# Patient Record
Sex: MALE | Race: WHITE | NOT HISPANIC OR LATINO | Employment: OTHER | URBAN - METROPOLITAN AREA
[De-identification: names, ages, dates, MRNs, and addresses within clinical notes are randomized per-mention and may not be internally consistent; named-entity substitution may affect disease eponyms.]

---

## 2017-04-10 ENCOUNTER — OFFICE VISIT (OUTPATIENT)
Dept: DERMATOLOGY | Facility: CLINIC | Age: 67
End: 2017-04-10
Payer: COMMERCIAL

## 2017-04-10 DIAGNOSIS — Z85.828 HISTORY OF NONMELANOMA SKIN CANCER: ICD-10-CM

## 2017-04-10 DIAGNOSIS — L82.1 SK (SEBORRHEIC KERATOSIS): ICD-10-CM

## 2017-04-10 DIAGNOSIS — D22.9 BENIGN NEVUS: ICD-10-CM

## 2017-04-10 DIAGNOSIS — L57.0 ACTINIC KERATOSIS: Primary | ICD-10-CM

## 2017-04-10 PROCEDURE — 17003 DESTRUCT PREMALG LES 2-14: CPT | Mod: S$GLB,,, | Performed by: DERMATOLOGY

## 2017-04-10 PROCEDURE — 99999 PR PBB SHADOW E&M-EST. PATIENT-LVL II: CPT | Mod: PBBFAC,,, | Performed by: DERMATOLOGY

## 2017-04-10 PROCEDURE — 99214 OFFICE O/P EST MOD 30 MIN: CPT | Mod: 25,S$GLB,, | Performed by: DERMATOLOGY

## 2017-04-10 PROCEDURE — 1160F RVW MEDS BY RX/DR IN RCRD: CPT | Mod: S$GLB,,, | Performed by: DERMATOLOGY

## 2017-04-10 PROCEDURE — 17000 DESTRUCT PREMALG LESION: CPT | Mod: S$GLB,,, | Performed by: DERMATOLOGY

## 2017-04-10 RX ORDER — POTASSIUM CHLORIDE 750 MG/1
CAPSULE, EXTENDED RELEASE ORAL
Refills: 1 | COMMUNITY
Start: 2017-03-06 | End: 2017-06-19 | Stop reason: SDUPTHER

## 2017-04-10 RX ORDER — FUROSEMIDE 20 MG/1
20 TABLET ORAL
Refills: 1 | COMMUNITY
Start: 2017-03-06 | End: 2020-02-17

## 2017-04-10 NOTE — PATIENT INSTRUCTIONS
CRYOSURGERY      Your doctor has used a method called cryosurgery to treat your skin condition. Cryosurgery refers to the use of very cold substances to treat a variety of skin conditions such as warts, pre-skin cancers, molluscum contagiosum, sun spots, and several benign growths. The substance we use in cryosurgery is liquid nitrogen and is so cold (-195 degrees Celsius) that is burns when administered.     Following treatment in the office, the skin may immediately burn and become red. You may find the area around the lesion is affected as well. It is sometimes necessary to treat not only the lesion, but a small area of the surrounding normal skin to achieve a good response.     A blister, and even a blood filled blister, may form after treatment.   This is a normal response. If the blister is painful, it is acceptable to sterilize a needle and with rubbing alcohol and gently pop the blister. It is important that you gently wash the area with soap and warm water as the blister fluid may contain wart virus if a wart was treated. Do no remove the roof of the blister.     The area treated can take anywhere from 1-3 weeks to heal. Healing time depends on the kind skin lesion treated, the location, and how aggressively the lesion was treated. It is recommended that the areas treated are covered with Vaseline or bacitracin ointment and a band-aid. If a band-aid is not practical, just ointment applied several times per day will do. Keeping these areas moist will speed the healing time.    Treatment with liquid nitrogen can leave a scar. In dark skin, it may be a light or dark scar, in light skin it may be a white or pink scar. These will generally fade with time, but may never go away completely.     If you have any concerns after your treatment, please feel free to call the office.       [address] [facility] [place] [address]/ [phone] FAX/ [email or web address]    XEROSIS (DRY  SKIN)        1. Definition    Xerosis is the term for dry skin.  We all have a natural oil coating over our skin produced by the skin oil glands.  If this oil is removed, the skin becomes dry which can lead to cracking, which can lead to inflammation.  Xerosis is usually a long-term problem that recurs often, especially in the winter.    2. Cause     Long hot baths or showers can remove our natural oil and lead to xerosis.  One should never take more than one bath or shower a day and for no longer than ten minutes.   Use of harsh soaps such as Zest, Dial, and Ivory can worsen and cause xerosis.   Cold winter weather worsens xerosis because the amount of moisture contained in cold air is much less than the amount of moisture in warm air.    3. Treatment     Treatment is intended to restore the natural oil to your skin.  Keep the skin lubricated.     Do not take more than one bath or shower a day.  Use lukewarm water, not hot.  Hot water dries out the skin.     Use a gentle moisturizing soap such as Cetaphil soap, Oil of Olay, Dove, Basis, Ivory moisture care, Restoraderm cleanser.     When toweling dry, dont rub.  Blot the skin so there is still some water left on the skin.  You should apply a moisturizing cream to all of the skin such as Cerave cream, Cetaphil cream, Restoraderm or Eucerin Original Formula cream.   Alpha hydroxyacid lotions, i.e., AmLactin, also work very well for preventing dry skin, but may burn when used on inflamed or reddened skin.     If you like to swim during the winter months, you should not use soap when getting out of the pool.  When you have finished swimming, rinse off the chlorine with cool to warm water.  If this will be the only shower of the day, then you may use Cetaphil or another mild soap to cleanse your skin.  After the shower, apply a moisturizing cream to all of the skin as above.        [address] [place] [address]/ [phone] FAX/  [email or web address]

## 2017-04-10 NOTE — PROGRESS NOTES
Subjective:       Patient ID:  Matt Rae is a 67 y.o. male who presents for   Chief Complaint   Patient presents with    Spot     right chin x 1 year left ant shoulder x few months, rough no prior tx     Skin Check     TBSE     HPI Comments: History of Present Illness: The patient presents for follow up of skin check.    The patient was last seen on: 12/15/15 by LCB for bx of scc left dorsal hand - excised by PWS  H/o ak's in past  This is a high risk patient here to check for the development of new lesions.    Other skin complaints: spots on leg x 1 year and on left shoulder + rough. No prev treatment      Spot         Review of Systems   Skin: Positive for daily sunscreen use (in moisturizer and wears long sleeve shirts) and activity-related sunscreen use. Negative for recent sunburn.   Hematologic/Lymphatic: Bruises/bleeds easily (on xeralto).        Objective:    Physical Exam   Constitutional: He appears well-developed and well-nourished. He is obese.  No distress.   Neurological: He is alert and oriented to person, place, and time. He is not disoriented.   Psychiatric: He has a normal mood and affect.   Skin:   Areas Examined (abnormalities noted in diagram):   Scalp / Hair Palpated and Inspected  Head / Face Inspection Performed  Neck Inspection Performed  Chest / Axilla Inspection Performed  Abdomen Inspection Performed  Genitals / Buttocks / Groin Inspection Performed  Back Inspection Performed  RUE Inspected  LUE Inspection Performed  RLE Inspected  LLE Inspection Performed  Nails and Digits Inspection Performed                   Diagram Legend     Erythematous scaling macule/papule c/w actinic keratosis       Vascular papule c/w angioma      Pigmented verrucoid papule/plaque c/w seborrheic keratosis      Yellow umbilicated papule c/w sebaceous hyperplasia      Irregularly shaped tan macule c/w lentigo     1-2 mm smooth white papules consistent with Milia      Movable subcutaneous cyst with  punctum c/w epidermal inclusion cyst      Subcutaneous movable cyst c/w pilar cyst      Firm pink to brown papule c/w dermatofibroma      Pedunculated fleshy papule(s) c/w skin tag(s)      Evenly pigmented macule c/w junctional nevus     Mildly variegated pigmented, slightly irregular-bordered macule c/w mildly atypical nevus      Flesh colored to evenly pigmented papule c/w intradermal nevus       Pink pearly papule/plaque c/w basal cell carcinoma      Erythematous hyperkeratotic cursted plaque c/w SCC      Surgical scar with no sign of skin cancer recurrence      Open and closed comedones      Inflammatory papules and pustules      Verrucoid papule consistent consistent with wart     Erythematous eczematous patches and plaques     Dystrophic onycholytic nail with subungual debris c/w onychomycosis     Umbilicated papule    Erythematous-base heme-crusted tan verrucoid plaque consistent with inflamed seborrheic keratosis     Erythematous Silvery Scaling Plaque c/w Psoriasis     See annotation      Assessment / Plan:        Actinic keratosis  Cryosurgery Procedure Note    Verbal consent from the patient is obtained and the patient is aware of the precancerous quality and need for treatment of these lesions. Liquid nitrogen cryosurgery is applied to the 8 actinic keratoses, as detailed in the physical exam, to produce a freeze injury. The patient is aware that blisters may form and is instructed on wound care with gentle cleansing and use of vaseline ointment to keep moist until healed. The patient is supplied a handout on cryosurgery and is instructed to call if lesions do not completely resolve.      SK (seborrheic keratosis)  These are benign inherited growths without a malignant potential. Reassurance given to patient. No treatment is necessary.       Benign nevus  Reassurance.     History of nonmelanoma skin cancer  Area(s) of previous NMSC evaluated with no signs of recurrence.  Total body skin examination  performed today including at least 12 points as noted in physical examination. No lesions suspicious for malignancy noted.    Discussed moisturizing body soaps         Return in about 6 months (around 10/10/2017).

## 2017-04-26 ENCOUNTER — OFFICE VISIT (OUTPATIENT)
Dept: INTERNAL MEDICINE | Facility: CLINIC | Age: 67
End: 2017-04-26
Payer: COMMERCIAL

## 2017-04-26 VITALS
DIASTOLIC BLOOD PRESSURE: 76 MMHG | HEART RATE: 70 BPM | HEIGHT: 73 IN | SYSTOLIC BLOOD PRESSURE: 122 MMHG | BODY MASS INDEX: 38.13 KG/M2 | WEIGHT: 287.69 LBS

## 2017-04-26 DIAGNOSIS — L73.1 INGROWN HAIR: Primary | ICD-10-CM

## 2017-04-26 PROCEDURE — 3074F SYST BP LT 130 MM HG: CPT | Mod: S$GLB,,, | Performed by: INTERNAL MEDICINE

## 2017-04-26 PROCEDURE — 99999 PR PBB SHADOW E&M-EST. PATIENT-LVL III: CPT | Mod: PBBFAC,,, | Performed by: INTERNAL MEDICINE

## 2017-04-26 PROCEDURE — 99213 OFFICE O/P EST LOW 20 MIN: CPT | Mod: S$GLB,,, | Performed by: INTERNAL MEDICINE

## 2017-04-26 PROCEDURE — 3078F DIAST BP <80 MM HG: CPT | Mod: S$GLB,,, | Performed by: INTERNAL MEDICINE

## 2017-04-26 NOTE — PROGRESS NOTES
"Subjective:       Patient ID: Matt Rae is a 67 y.o. male.    Chief Complaint: Oral Swelling (bump on lip) and Sore Throat    HPI     66 yo male w/ hx of long term AC use via xarelto presents to clinic today w/ complaints of x10 day hx of L "lip sore." Pt states that it has drained multiple times, last of which was yesterday. Denies any fevers, swelling, erythema along face or this being a yearly occurrence. Rest of ROS as below.        Review of Systems   Constitutional: Negative for chills, fatigue and fever.   HENT: Negative for congestion, nosebleeds, rhinorrhea, sneezing and sore throat.    Eyes: Negative for photophobia, pain and itching.   Respiratory: Negative for cough, chest tightness, shortness of breath and wheezing.    Cardiovascular: Negative for chest pain and palpitations.   Gastrointestinal: Negative for abdominal distention, anal bleeding, constipation, diarrhea and nausea.   Endocrine: Negative for cold intolerance, heat intolerance, polydipsia and polyuria.   Genitourinary: Negative for dysuria, frequency and urgency.   Musculoskeletal: Negative for arthralgias, joint swelling and myalgias.   Neurological: Negative for dizziness, tremors, seizures, numbness and headaches.   Psychiatric/Behavioral: Negative for agitation.       Objective:      Physical Exam   Constitutional: He is oriented to person, place, and time. He appears well-developed and well-nourished. No distress.   HENT:   Nose: Nose normal.   Mouth/Throat: No oropharyngeal exudate.   Eyes: EOM are normal. Pupils are equal, round, and reactive to light. Right eye exhibits no discharge. Left eye exhibits no discharge. No scleral icterus.   Neck: Normal range of motion. JVD present.   Cardiovascular: Normal rate, regular rhythm, normal heart sounds and intact distal pulses.  Exam reveals no gallop and no friction rub.    No murmur heard.  Pulmonary/Chest: Effort normal. No respiratory distress. He has no rales.   Abdominal: Soft. " Bowel sounds are normal. He exhibits no distension and no mass. There is no tenderness. There is no rebound and no guarding.   Musculoskeletal: Normal range of motion. He exhibits no edema or tenderness.   Lymphadenopathy:     He has no cervical adenopathy.   Neurological: He is alert and oriented to person, place, and time. No cranial nerve deficit.   Skin: Skin is warm. He is not diaphoretic. No erythema.   Psychiatric: He has a normal mood and affect.       Assessment:       1. Ingrown hair        Plan:         --assured pt that ingrown hair/acne is healing, and that it is not anything more serious  --instructed pt to cont warm compresses and apply OTC anti-bacterial cream q daily for the duration of his trip     RTC prfelisha Mallory M.D.  PGY-3, Internal Medicine  [phone]

## 2017-04-26 NOTE — MR AVS SNAPSHOT
[facility]  [address]  [place] [address]  Phone: [phone]  Fax: [phone]                  Matt Rae   2017 1:00 PM   Office Visit    Description:  Male : 1950   Provider:  Alejo Mallory MD   Department:  [facility]           Reason for Visit     Oral Swelling     Sore Throat                To Do List           Goals (5 Years of Data)     None      [facility]     [facility] On Call Nurse Care Line -  Assistance  Unless otherwise directed by your provider, please contact [facility], our nurse care line that is available for  assistance.     Registered nurses in the [facility] Center provide: appointment scheduling, clinical advisement, health education, and other advisory services.  Call: [phone] (toll free)               Medications           Message regarding Medications     Verify the changes and/or additions to your medication regime listed below are the same as discussed with your clinician today.  If any of these changes or additions are incorrect, please notify your healthcare provider.             Verify that the below list of medications is an accurate representation of the medications you are currently taking.  If none reported, the list may be blank. If incorrect, please contact your healthcare provider. Carry this list with you in case of emergency.           Current Medications     aspirin (ECOTRIN) 81 MG EC tablet Take 81 mg by mouth once daily.    b complex vitamins tablet Take 1 tablet by mouth once daily.    captopril (CAPOTEN) 12.5 MG tablet Take 12.5 mg by mouth 2 (two) times daily.     furosemide (LASIX) 20 MG tablet     methocarbamol (ROBAXIN) 750 MG Tab Take 750 mg by mouth as needed.     metoprolol succinate (TOPROL-XL) 25 MG 24 hr tablet Take 25 mg by mouth once daily.     multivitamin capsule Take 1 capsule by mouth once daily.      potassium chloride (MICRO-K) 10 MEQ CpSR      "rivaroxaban (XARELTO) 20 mg Tab Take 20 mg by mouth once daily.    simvastatin (ZOCOR) 10 MG tablet Take 10 mg by mouth every evening.     tramadol (ULTRAM) 50 mg tablet Take 50 mg by mouth once daily.     VIAGRA 100 mg tablet            Clinical Reference Information           Your Vitals Were     BP Pulse Height Weight BMI    122/76 (BP Location: Left arm, Patient Position: Sitting, BP Method: Manual) 70 6' 1" (1.854 m) 130.5 kg (287 lb 11.2 oz) 37.96 kg/m2      Blood Pressure          Most Recent Value    BP  122/76      Allergies as of 4/26/2017     Penicillins      Immunizations Administered on Date of Encounter - 4/26/2017     None      Language Assistance Services     ATTENTION: Language assistance services are available, free of charge. Please call [phone].      ATENCIÓN: Si jamison shipman, tiene a vasquez disposición servicios gratuitos de asistencia lingüística. Llame al [phone].     CHÚ Ý: N?u b?n nói Ti?ng Vi?t, có các d?ch v? h? tr? ngôn ng? mi?n phí dành cho b?n. G?i s? [phone].         [facility] complies with applicable Federal civil rights laws and does not discriminate on the basis of race, color, national origin, age, disability, or sex.        "

## 2017-04-27 NOTE — PROGRESS NOTES
Preceptor note    Patient's history and physical discussed, please refer to resident physician's note for specific details. Pt seen  with resident physician. Medical record reviewed  I agree with resident's assessment and plan.

## 2017-06-19 ENCOUNTER — OFFICE VISIT (OUTPATIENT)
Dept: HEMATOLOGY/ONCOLOGY | Facility: CLINIC | Age: 67
End: 2017-06-19
Payer: COMMERCIAL

## 2017-06-19 VITALS
DIASTOLIC BLOOD PRESSURE: 77 MMHG | WEIGHT: 293.19 LBS | SYSTOLIC BLOOD PRESSURE: 137 MMHG | HEART RATE: 76 BPM | HEIGHT: 73 IN | TEMPERATURE: 98 F | RESPIRATION RATE: 18 BRPM | BODY MASS INDEX: 38.86 KG/M2

## 2017-06-19 DIAGNOSIS — J01.10 ACUTE FRONTAL SINUSITIS, RECURRENCE NOT SPECIFIED: Chronic | ICD-10-CM

## 2017-06-19 DIAGNOSIS — D75.89 MACROCYTOSIS WITHOUT ANEMIA: Chronic | ICD-10-CM

## 2017-06-19 DIAGNOSIS — D68.61 ANTICARDIOLIPIN SYNDROME: Chronic | ICD-10-CM

## 2017-06-19 DIAGNOSIS — Z86.711 HISTORY OF PULMONARY EMBOLUS (PE): Chronic | ICD-10-CM

## 2017-06-19 PROCEDURE — 99214 OFFICE O/P EST MOD 30 MIN: CPT | Mod: ,,, | Performed by: INTERNAL MEDICINE

## 2017-06-19 RX ORDER — AZITHROMYCIN 1 G/1
1 POWDER, FOR SUSPENSION ORAL ONCE
Qty: 1 PACKET | Refills: 0 | Status: SHIPPED | OUTPATIENT
Start: 2017-06-19 | End: 2017-06-19

## 2017-06-19 RX ORDER — POTASSIUM CHLORIDE 750 MG/1
10 TABLET, EXTENDED RELEASE ORAL
COMMUNITY
Start: 2017-05-16 | End: 2020-02-17

## 2017-06-19 NOTE — LETTER
June 19, 2017      Shlomo Zavala MD  [address]  [place] [address]           [facility]  [address]  [address]  [place] [address]  Phone: [phone]  Fax: [phone]          Patient: Matt Rae   MR Number: [id number]   YOB: 1950   Date of Visit: 6/19/2017       Dear Dr. Shlomo Zavala:    Thank you for referring Matt Rae to me for evaluation. Attached you will find relevant portions of my assessment and plan of care.    If you have questions, please do not hesitate to call me. I look forward to following Matt Rae along with you.    Sincerely,    Silvestre James MD    Enclosure  CC:  No Recipients    If you would like to receive this communication electronically, please contact [email or web address] or [phone] to request more information on [facility] access.    For providers and/or their staff who would like to refer a patient to [facility], please contact us through our one-stop-shop provider referral line, [facility] [place], at [phone].    If you feel you have received this communication in error or would no longer like to receive these types of communications, please e-mail [email or web address]

## 2017-06-19 NOTE — ASSESSMENT & PLAN NOTE
Patient seems to be doing well currently.  Continues on Xarleto without significant problems although I feel the bleeding is hemrroidal and worsened by the blood thinner. No clots, DVT/PE.

## 2017-06-19 NOTE — PROGRESS NOTES
PROGRESS NOTE    Subjective:       Patient ID: Matt Rae is a 67 y.o. male.    Chief Complaint:  Follow-up; Results; and Recurrent Sinusitis      History of Present Illness:   Matt Rae is a 67 y.o. male who presents with a history of hypercoag state and macrocytosis.  Patient complaint of sinus headache, nasal drip and congestion. No fever.  Also had intermittent episodes of brbpr associated with hemorrhoids.        Family and Social history reviewed and is unchanged from 12/9/2014.      ROS:  Review of Systems   Constitutional: Negative for fever and unexpected weight change.   HENT: Negative for nosebleeds.    Respiratory: Negative for chest tightness and shortness of breath.    Cardiovascular: Negative for chest pain.   Gastrointestinal: Negative for abdominal pain and blood in stool.   Genitourinary: Negative for hematuria.   Skin: Negative for rash.   Hematological: Does not bruise/bleed easily.          Current Outpatient Prescriptions:     aspirin (ECOTRIN) 81 MG EC tablet, Take 81 mg by mouth once daily., Disp: , Rfl:     b complex vitamins tablet, Take 1 tablet by mouth once daily., Disp: , Rfl:     captopril (CAPOTEN) 12.5 MG tablet, Take 12.5 mg by mouth 2 (two) times daily. , Disp: , Rfl:     furosemide (LASIX) 20 MG tablet, , Disp: , Rfl: 1    methocarbamol (ROBAXIN) 750 MG Tab, Take 750 mg by mouth as needed. , Disp: , Rfl:     metoprolol succinate (TOPROL-XL) 25 MG 24 hr tablet, Take 25 mg by mouth once daily. , Disp: , Rfl:     multivitamin capsule, Take 1 capsule by mouth once daily.  , Disp: , Rfl:     potassium chloride (KLOR-CON) 10 MEQ TbSR, , Disp: , Rfl:     rivaroxaban (XARELTO) 20 mg Tab, Take 20 mg by mouth once daily., Disp: , Rfl:     simvastatin (ZOCOR) 10 MG tablet, Take 10 mg by mouth every evening. , Disp: , Rfl:     tramadol (ULTRAM) 50 mg tablet, Take 50 mg by mouth once daily. , Disp: , Rfl:     VIAGRA  "100 mg tablet, , Disp: , Rfl:     azithromycin (ZITHROMAX) 1 gram Pack, Take 1 g by mouth once., Disp: 1 packet, Rfl: 0        Objective:       Physical Examination:     /77   Pulse 76   Temp 98.1 °F (36.7 °C)   Resp 18   Ht 6' 1" (1.854 m)   Wt 133 kg (293 lb 3.2 oz)   BMI 38.68 kg/m²     Physical Exam   Constitutional: He is oriented to person, place, and time. He appears well-developed and well-nourished.   HENT:   Head: Normocephalic and atraumatic.   Right Ear: External ear normal.   Left Ear: External ear normal.   Mouth/Throat: Oropharynx is clear and moist.   Eyes: Conjunctivae are normal. Pupils are equal, round, and reactive to light. No scleral icterus.   Neck: Normal range of motion. Neck supple.   Cardiovascular: Normal rate, regular rhythm and normal heart sounds.  Exam reveals no gallop and no friction rub.    No murmur heard.  Pulmonary/Chest: Effort normal and breath sounds normal. No respiratory distress. He has no rales. He exhibits no tenderness.   Abdominal: Soft. Bowel sounds are normal. He exhibits no distension and no mass. There is no hepatosplenomegaly. There is no tenderness. There is no rebound and no guarding.   Musculoskeletal: He exhibits no edema.   Lymphadenopathy:        Head (right side): No tonsillar adenopathy present.        Head (left side): No tonsillar adenopathy present.     He has no cervical adenopathy.     He has no axillary adenopathy.        Right: No supraclavicular adenopathy present.        Left: No supraclavicular adenopathy present.   Neurological: He is alert and oriented to person, place, and time.   Psychiatric: He has a normal mood and affect. His behavior is normal. Judgment and thought content normal.   Vitals reviewed.      Labs:   No results found for this or any previous visit (from the past 336 hour(s)).  CMP  Sodium   Date Value Ref Range Status   03/09/2016 140 136 - 145 mmol/L Final     Potassium   Date Value Ref Range Status   03/09/2016 " 4.0 3.5 - 5.1 mmol/L Final     Chloride   Date Value Ref Range Status   03/09/2016 103 95 - 110 mmol/L Final     CO2   Date Value Ref Range Status   03/09/2016 24 23 - 29 mmol/L Final     Glucose   Date Value Ref Range Status   03/09/2016 91 70 - 110 mg/dL Final     BUN, Bld   Date Value Ref Range Status   03/09/2016 16 9 - 21 mg/dL Final     Creatinine   Date Value Ref Range Status   03/09/2016 0.85 0.50 - 1.40 mg/dL Final   01/27/2013 1.0 0.5 - 1.4 mg/dL Final     Calcium   Date Value Ref Range Status   03/09/2016 9.1 8.7 - 10.5 mg/dL Final   01/27/2013 9.5 8.7 - 10.5 mg/dL Final     Total Protein   Date Value Ref Range Status   03/09/2016 6.9 6.0 - 8.4 g/dL Final     Albumin   Date Value Ref Range Status   03/09/2016 3.9 3.5 - 5.2 g/dL Final     Total Bilirubin   Date Value Ref Range Status   03/09/2016 0.4 0.1 - 1.0 mg/dL Final     Comment:     For infants and newborns, interpretation of results should be based  on gestational age, weight and in agreement with clinical  observations.  Premature Infant recommended reference ranges:  Up to 24 hours.............<8.0 mg/dL  Up to 48 hours............<12.0 mg/dL  3-5 days..................<15.0 mg/dL  6-29 days.................<15.0 mg/dL       Alkaline Phosphatase   Date Value Ref Range Status   03/09/2016 56 38 - 145 U/L Final   01/27/2013 63 55 - 135 U/L Final     AST ([place])   Date Value Ref Range Status   03/09/2016 29 17 - 59 U/L Final     ALT   Date Value Ref Range Status   03/09/2016 32 10 - 44 U/L Final     Anion Gap   Date Value Ref Range Status   03/09/2016 13 8 - 16 mmol/L Final   01/27/2013 13 5 - 15 meq/L Final     eGFR if    Date Value Ref Range Status   03/09/2016 >60 >60 mL/min/1.73 m^2 Final     eGFR if non    Date Value Ref Range Status   03/09/2016 >60 >60 mL/min/1.73 m^2 Final     Comment:     Calculation used to obtain the estimated glomerular filtration  rate (eGFR) is the CKD-EPI equation. Since race is  unknown   in our information system, the eGFR values for   -American and Non--American patients are given   for each creatinine result.       No results found for: CEA  Lab Results   Component Value Date    PSA 1.0 01/26/2016    PSA 0.57 05/14/2010    PSA 0.8 06/20/2008           Assessment/Plan:   Anticardiolipin syndrome  Patient seems to be doing well currently.  Continues on Xarleto without significant problems although I feel the bleeding is hemrroidal and worsened by the blood thinner. No clots, DVT/PE.      Acute frontal sinusitis  Will prescibe azithromycin.  Patient continues to have symptoms.      Discussion:   I have spent greater than 25 minutes in the care of this patient discussing the issues reflected in the assessment and plan.  Greater than 50% face to face.  All questions answered to the patients satisfaction.    Return in about 6 months (around 12/19/2017).      Electronically signed by Silvestre Albert

## 2017-09-18 PROBLEM — J01.10 ACUTE FRONTAL SINUSITIS: Chronic | Status: RESOLVED | Noted: 2017-06-19 | Resolved: 2017-09-18

## 2017-09-25 ENCOUNTER — OFFICE VISIT (OUTPATIENT)
Dept: HEMATOLOGY/ONCOLOGY | Facility: CLINIC | Age: 67
End: 2017-09-25
Payer: COMMERCIAL

## 2017-09-25 VITALS
WEIGHT: 292.13 LBS | HEIGHT: 73 IN | HEART RATE: 76 BPM | SYSTOLIC BLOOD PRESSURE: 120 MMHG | TEMPERATURE: 98 F | BODY MASS INDEX: 38.72 KG/M2 | DIASTOLIC BLOOD PRESSURE: 87 MMHG | RESPIRATION RATE: 18 BRPM

## 2017-09-25 DIAGNOSIS — R58 BLEEDING: Chronic | ICD-10-CM

## 2017-09-25 DIAGNOSIS — D68.61 ANTICARDIOLIPIN SYNDROME: Chronic | ICD-10-CM

## 2017-09-25 PROCEDURE — 3008F BODY MASS INDEX DOCD: CPT | Mod: ,,, | Performed by: INTERNAL MEDICINE

## 2017-09-25 PROCEDURE — 3074F SYST BP LT 130 MM HG: CPT | Mod: ,,, | Performed by: INTERNAL MEDICINE

## 2017-09-25 PROCEDURE — 3079F DIAST BP 80-89 MM HG: CPT | Mod: ,,, | Performed by: INTERNAL MEDICINE

## 2017-09-25 PROCEDURE — 99214 OFFICE O/P EST MOD 30 MIN: CPT | Mod: ,,, | Performed by: INTERNAL MEDICINE

## 2017-09-25 RX ORDER — AZITHROMYCIN 500 MG/1
TABLET, FILM COATED ORAL
COMMUNITY
Start: 2017-06-19 | End: 2018-03-07

## 2017-09-25 NOTE — PROGRESS NOTES
PROGRESS NOTE    Subjective:       Patient ID: Matt Rae is a 67 y.o. male.    Chief Complaint:  Follow-up (hospital stay ) and Results (labs on chart)      History of Present Illness:   Matt Rae is a 67 y.o. male who presents with a history of hypercoag state and macrocytosis. Patient went to the ED 3 days ago with an episode of epistaxis which started early in the morning.  This began spontaneously and he was unable to control it so he went to the hospital.  He was given afrin in ER and it ceased after approximately 30 minutes.  He was on his xarelto and had not missed any doses.      Family and Social history reviewed and is unchanged from 12/9/2014.      ROS:  Review of Systems   Constitutional: Negative for fever and unexpected weight change.   HENT: Negative for nosebleeds.    Respiratory: Negative for chest tightness and shortness of breath.    Cardiovascular: Negative for chest pain.   Gastrointestinal: Negative for abdominal pain and blood in stool.   Genitourinary: Negative for hematuria.   Skin: Negative for rash.   Hematological: Does not bruise/bleed easily.          Current Outpatient Prescriptions:     aspirin (ECOTRIN) 81 MG EC tablet, Take 81 mg by mouth once daily., Disp: , Rfl:     azithromycin (ZITHROMAX) 500 MG tablet, , Disp: , Rfl:     b complex vitamins tablet, Take 1 tablet by mouth once daily., Disp: , Rfl:     captopril (CAPOTEN) 12.5 MG tablet, Take 12.5 mg by mouth 2 (two) times daily. , Disp: , Rfl:     furosemide (LASIX) 20 MG tablet, , Disp: , Rfl: 1    methocarbamol (ROBAXIN) 750 MG Tab, Take 750 mg by mouth as needed. , Disp: , Rfl:     metoprolol succinate (TOPROL-XL) 25 MG 24 hr tablet, Take 25 mg by mouth once daily. , Disp: , Rfl:     multivitamin capsule, Take 1 capsule by mouth once daily.  , Disp: , Rfl:     potassium chloride (KLOR-CON) 10 MEQ TbSR, , Disp: , Rfl:     rivaroxaban (XARELTO) 20 mg  "Tab, Take 20 mg by mouth once daily., Disp: , Rfl:     simvastatin (ZOCOR) 10 MG tablet, Take 10 mg by mouth every evening. , Disp: , Rfl:     tramadol (ULTRAM) 50 mg tablet, Take 50 mg by mouth once daily. , Disp: , Rfl:     VIAGRA 100 mg tablet, , Disp: , Rfl:     rivaroxaban (XARELTO) 10 mg Tab, Take 1 tablet (10 mg total) by mouth daily with dinner or evening meal., Disp: 30 tablet, Rfl: 11        Objective:       Physical Examination:     /87   Pulse 76   Temp 98.1 °F (36.7 °C)   Resp 18   Ht 6' 1" (1.854 m)   Wt 132.5 kg (292 lb 1.6 oz)   BMI 38.54 kg/m²     Physical Exam   Constitutional: He is oriented to person, place, and time. He appears well-developed and well-nourished.   HENT:   Head: Normocephalic and atraumatic.   Right Ear: External ear normal.   Left Ear: External ear normal.   Mouth/Throat: Oropharynx is clear and moist.   Eyes: Conjunctivae are normal. Pupils are equal, round, and reactive to light. No scleral icterus.   Neck: Normal range of motion. Neck supple.   Cardiovascular: Normal rate, regular rhythm and normal heart sounds.  Exam reveals no gallop and no friction rub.    No murmur heard.  Pulmonary/Chest: Effort normal and breath sounds normal. No respiratory distress. He has no rales. He exhibits no tenderness.   Abdominal: Soft. Bowel sounds are normal. He exhibits no distension and no mass. There is no hepatosplenomegaly. There is no tenderness. There is no rebound and no guarding.   Musculoskeletal: He exhibits no edema.   Lymphadenopathy:        Head (right side): No tonsillar adenopathy present.        Head (left side): No tonsillar adenopathy present.     He has no cervical adenopathy.     He has no axillary adenopathy.        Right: No supraclavicular adenopathy present.        Left: No supraclavicular adenopathy present.   Neurological: He is alert and oriented to person, place, and time.   Psychiatric: He has a normal mood and affect. His behavior is normal. " Judgment and thought content normal.   Vitals reviewed.      Labs:   No results found for this or any previous visit (from the past 336 hour(s)).  CMP  Sodium   Date Value Ref Range Status   03/09/2016 140 136 - 145 mmol/L Final     Potassium   Date Value Ref Range Status   03/09/2016 4.0 3.5 - 5.1 mmol/L Final     Chloride   Date Value Ref Range Status   03/09/2016 103 95 - 110 mmol/L Final     CO2   Date Value Ref Range Status   03/09/2016 24 23 - 29 mmol/L Final     Glucose   Date Value Ref Range Status   03/09/2016 91 70 - 110 mg/dL Final     BUN, Bld   Date Value Ref Range Status   03/09/2016 16 9 - 21 mg/dL Final     Creatinine   Date Value Ref Range Status   03/09/2016 0.85 0.50 - 1.40 mg/dL Final   01/27/2013 1.0 0.5 - 1.4 mg/dL Final     Calcium   Date Value Ref Range Status   03/09/2016 9.1 8.7 - 10.5 mg/dL Final   01/27/2013 9.5 8.7 - 10.5 mg/dL Final     Total Protein   Date Value Ref Range Status   03/09/2016 6.9 6.0 - 8.4 g/dL Final     Albumin   Date Value Ref Range Status   03/09/2016 3.9 3.5 - 5.2 g/dL Final     Total Bilirubin   Date Value Ref Range Status   03/09/2016 0.4 0.1 - 1.0 mg/dL Final     Comment:     For infants and newborns, interpretation of results should be based  on gestational age, weight and in agreement with clinical  observations.  Premature Infant recommended reference ranges:  Up to 24 hours.............<8.0 mg/dL  Up to 48 hours............<12.0 mg/dL  3-5 days..................<15.0 mg/dL  6-29 days.................<15.0 mg/dL       Alkaline Phosphatase   Date Value Ref Range Status   03/09/2016 56 38 - 145 U/L Final   01/27/2013 63 55 - 135 U/L Final     AST ([place])   Date Value Ref Range Status   03/09/2016 29 17 - 59 U/L Final     ALT   Date Value Ref Range Status   03/09/2016 32 10 - 44 U/L Final     Anion Gap   Date Value Ref Range Status   03/09/2016 13 8 - 16 mmol/L Final   01/27/2013 13 5 - 15 meq/L Final     eGFR if    Date Value Ref Range  Status   03/09/2016 >60 >60 mL/min/1.73 m^2 Final     eGFR if non    Date Value Ref Range Status   03/09/2016 >60 >60 mL/min/1.73 m^2 Final     Comment:     Calculation used to obtain the estimated glomerular filtration  rate (eGFR) is the CKD-EPI equation. Since race is unknown   in our information system, the eGFR values for   -American and Non--American patients are given   for each creatinine result.       No results found for: CEA  Lab Results   Component Value Date    PSA 1.0 01/26/2016    PSA 0.57 05/14/2010    PSA 0.8 06/20/2008           Assessment/Plan:   Anticardiolipin syndrome  Patient has developed a bleeding issues while on Xarelto 20mg daily.  Will give another 3 days off then begin on prophylactic dosing given this risk of bleeding.  Will begin 10mg daily.  Patient will call with any further problems or issues.  Will have him back in one month to see how things are going.     Bleeding/Epistaxis  See above.     Discussion:   I have spent greater than 25 minutes in the care of this patient discussing the issues reflected in the assessment and plan.  Greater than 50% face to face.  All questions answered to the patients satisfaction.    Return in about 4 weeks (around 10/23/2017).      Electronically signed by Silvestre Albert

## 2017-09-25 NOTE — ASSESSMENT & PLAN NOTE
Patient has developed a bleeding issues while on Xarelto 20mg daily.  Will give another 3 days off then begin on prophylactic dosing given this risk of bleeding.  Will begin 10mg daily.  Patient will call with any further problems or issues.  Will have him back in one month to see how things are going.

## 2017-09-25 NOTE — LETTER
September 25, 2017      Shlomo Zavala MD  [address]  [place] [address]           [facility]  [address]  [address]  [place] [address]  Phone: [phone]  Fax: [phone]          Patient: Matt Rae   MR Number: [id number]   YOB: 1950   Date of Visit: 9/25/2017       Dear Dr. Shlomo Zavala:    Thank you for referring Matt Rae to me for evaluation. Attached you will find relevant portions of my assessment and plan of care.    If you have questions, please do not hesitate to call me. I look forward to following Matt Rae along with you.    Sincerely,    Silvestre James MD    Enclosure  CC:  No Recipients    If you would like to receive this communication electronically, please contact [email or web address] or [phone] to request more information on [facility] Link access.    For providers and/or their staff who would like to refer a patient to [facility], please contact us through our one-stop-shop provider referral line, [facility], at [phone].    If you feel you have received this communication in error or would no longer like to receive these types of communications, please e-mail [email or web address]

## 2017-09-27 ENCOUNTER — TELEPHONE (OUTPATIENT)
Dept: HEMATOLOGY/ONCOLOGY | Facility: CLINIC | Age: 67
End: 2017-09-27

## 2017-09-27 NOTE — TELEPHONE ENCOUNTER
----- Message from Verónica Samaniego sent at 9/27/2017  2:43 PM CDT -----  Contact: ELLA  PATIENT WAS TOLD TO LET DR MARTINEZ KNOW IF HE HAD ANOTHER NOSE BLEED AND HE STATED THAT HE DID HAVE ANOTHER NOSE BLEED TODAY. PLEASE ADVISE

## 2017-09-27 NOTE — TELEPHONE ENCOUNTER
Called pt to let him know to hold his xarelto for one week and resume the lower doseage after the week has ended.

## 2017-10-24 ENCOUNTER — OFFICE VISIT (OUTPATIENT)
Dept: HEMATOLOGY/ONCOLOGY | Facility: CLINIC | Age: 67
End: 2017-10-24
Payer: COMMERCIAL

## 2017-10-24 VITALS
TEMPERATURE: 98 F | HEIGHT: 73 IN | DIASTOLIC BLOOD PRESSURE: 75 MMHG | BODY MASS INDEX: 38.94 KG/M2 | SYSTOLIC BLOOD PRESSURE: 141 MMHG | RESPIRATION RATE: 18 BRPM | HEART RATE: 58 BPM | WEIGHT: 293.81 LBS

## 2017-10-24 DIAGNOSIS — D68.61 ANTICARDIOLIPIN SYNDROME: Chronic | ICD-10-CM

## 2017-10-24 PROCEDURE — 99213 OFFICE O/P EST LOW 20 MIN: CPT | Mod: ,,, | Performed by: INTERNAL MEDICINE

## 2017-10-24 NOTE — LETTER
October 24, 2017      Shlomo Zavala MD  [address]  [place] [address]           [facility]  [address]  [address]  [place] [address]  Phone: [phone]  Fax: [phone]          Patient: Matt Rae   MR Number: [id number]   YOB: 1950   Date of Visit: 10/24/2017       Dear Dr. Shlomo Zavala:    Thank you for referring Matt Rae to me for evaluation. Attached you will find relevant portions of my assessment and plan of care.    If you have questions, please do not hesitate to call me. I look forward to following Matt Rae along with you.    Sincerely,    Silvestre James MD    Enclosure  CC:  No Recipients    If you would like to receive this communication electronically, please contact [email or web address] or [phone] to request more information on [employer] Link access.    For providers and/or their staff who would like to refer a patient to [facility], please contact us through our one-stop-shop provider referral line, [facility], at [phone].    If you feel you have received this communication in error or would no longer like to receive these types of communications, please e-mail [email or web address]

## 2017-10-24 NOTE — ASSESSMENT & PLAN NOTE
Patient has no further bleeding on the prophylactic dose of Xarelto.  I instructed him to continue on the 10mg qHS and watch for any new symptoms such as sob, cp or lower ext swelling or pain.  Will see him again in 4 months with labs.

## 2017-10-24 NOTE — PROGRESS NOTES
PROGRESS NOTE    Subjective:       Patient ID: Matt Rae is a 67 y.o. male.    Chief Complaint:  Follow-up      History of Present Illness:   Matt Rae is a 67 y.o. male who presents with a history of hypercoag state and macrocytosis. Patient is now on 10mg of Xarelto and tolerating this well.  He has no bleeding at this time and overall feels well.  No sob, cp.       Family and Social history reviewed and is unchanged from 12/9/2014.      ROS:  Review of Systems   Constitutional: Negative for fever and unexpected weight change.   HENT: Negative for nosebleeds.    Respiratory: Negative for chest tightness and shortness of breath.    Cardiovascular: Negative for chest pain.   Gastrointestinal: Negative for abdominal pain and blood in stool.   Genitourinary: Negative for hematuria.   Skin: Negative for rash.   Hematological: Does not bruise/bleed easily.          Current Outpatient Prescriptions:     aspirin (ECOTRIN) 81 MG EC tablet, Take 81 mg by mouth once daily., Disp: , Rfl:     azithromycin (ZITHROMAX) 500 MG tablet, , Disp: , Rfl:     b complex vitamins tablet, Take 1 tablet by mouth once daily., Disp: , Rfl:     captopril (CAPOTEN) 12.5 MG tablet, Take 12.5 mg by mouth 2 (two) times daily. , Disp: , Rfl:     furosemide (LASIX) 20 MG tablet, , Disp: , Rfl: 1    methocarbamol (ROBAXIN) 750 MG Tab, Take 750 mg by mouth as needed. , Disp: , Rfl:     metoprolol succinate (TOPROL-XL) 25 MG 24 hr tablet, Take 25 mg by mouth once daily. , Disp: , Rfl:     multivitamin capsule, Take 1 capsule by mouth once daily.  , Disp: , Rfl:     potassium chloride (KLOR-CON) 10 MEQ TbSR, , Disp: , Rfl:     simvastatin (ZOCOR) 10 MG tablet, Take 10 mg by mouth every evening. , Disp: , Rfl:     tramadol (ULTRAM) 50 mg tablet, Take 50 mg by mouth once daily. , Disp: , Rfl:     VIAGRA 100 mg tablet, , Disp: , Rfl:         Objective:       Physical  "Examination:     BP (!) 141/75   Pulse (!) 58   Temp 97.6 °F (36.4 °C)   Resp 18   Ht 6' 1" (1.854 m)   Wt 133.3 kg (293 lb 12.8 oz)   BMI 38.76 kg/m²     Physical Exam   Constitutional: He is oriented to person, place, and time. He appears well-developed and well-nourished.   HENT:   Head: Normocephalic and atraumatic.   Right Ear: External ear normal.   Left Ear: External ear normal.   Mouth/Throat: Oropharynx is clear and moist.   Eyes: Conjunctivae are normal. Pupils are equal, round, and reactive to light. No scleral icterus.   Neck: Normal range of motion. Neck supple.   Cardiovascular: Normal rate, regular rhythm and normal heart sounds.  Exam reveals no gallop and no friction rub.    No murmur heard.  Pulmonary/Chest: Effort normal and breath sounds normal. No respiratory distress. He has no rales. He exhibits no tenderness.   Abdominal: Soft. Bowel sounds are normal. He exhibits no distension and no mass. There is no hepatosplenomegaly. There is no tenderness. There is no rebound and no guarding.   Musculoskeletal: He exhibits no edema.   Lymphadenopathy:        Head (right side): No tonsillar adenopathy present.        Head (left side): No tonsillar adenopathy present.     He has no cervical adenopathy.     He has no axillary adenopathy.        Right: No supraclavicular adenopathy present.        Left: No supraclavicular adenopathy present.   Neurological: He is alert and oriented to person, place, and time.   Psychiatric: He has a normal mood and affect. His behavior is normal. Judgment and thought content normal.   Vitals reviewed.      Labs:   No results found for this or any previous visit (from the past 336 hour(s)).  CMP  Sodium   Date Value Ref Range Status   03/09/2016 140 136 - 145 mmol/L Final     Potassium   Date Value Ref Range Status   03/09/2016 4.0 3.5 - 5.1 mmol/L Final     Chloride   Date Value Ref Range Status   03/09/2016 103 95 - 110 mmol/L Final     CO2   Date Value Ref Range " Status   03/09/2016 24 23 - 29 mmol/L Final     Glucose   Date Value Ref Range Status   03/09/2016 91 70 - 110 mg/dL Final     BUN, Bld   Date Value Ref Range Status   03/09/2016 16 9 - 21 mg/dL Final     Creatinine   Date Value Ref Range Status   03/09/2016 0.85 0.50 - 1.40 mg/dL Final   01/27/2013 1.0 0.5 - 1.4 mg/dL Final     Calcium   Date Value Ref Range Status   03/09/2016 9.1 8.7 - 10.5 mg/dL Final   01/27/2013 9.5 8.7 - 10.5 mg/dL Final     Total Protein   Date Value Ref Range Status   03/09/2016 6.9 6.0 - 8.4 g/dL Final     Albumin   Date Value Ref Range Status   03/09/2016 3.9 3.5 - 5.2 g/dL Final     Total Bilirubin   Date Value Ref Range Status   03/09/2016 0.4 0.1 - 1.0 mg/dL Final     Comment:     For infants and newborns, interpretation of results should be based  on gestational age, weight and in agreement with clinical  observations.  Premature Infant recommended reference ranges:  Up to 24 hours.............<8.0 mg/dL  Up to 48 hours............<12.0 mg/dL  3-5 days..................<15.0 mg/dL  6-29 days.................<15.0 mg/dL       Alkaline Phosphatase   Date Value Ref Range Status   03/09/2016 56 38 - 145 U/L Final   01/27/2013 63 55 - 135 U/L Final     AST ([place])   Date Value Ref Range Status   03/09/2016 29 17 - 59 U/L Final     ALT   Date Value Ref Range Status   03/09/2016 32 10 - 44 U/L Final     Anion Gap   Date Value Ref Range Status   03/09/2016 13 8 - 16 mmol/L Final   01/27/2013 13 5 - 15 meq/L Final     eGFR if    Date Value Ref Range Status   03/09/2016 >60 >60 mL/min/1.73 m^2 Final     eGFR if non    Date Value Ref Range Status   03/09/2016 >60 >60 mL/min/1.73 m^2 Final     Comment:     Calculation used to obtain the estimated glomerular filtration  rate (eGFR) is the CKD-EPI equation. Since race is unknown   in our information system, the eGFR values for   -American and Non--American patients are given   for each  creatinine result.       No results found for: CEA  Lab Results   Component Value Date    PSA 1.0 01/26/2016    PSA 0.57 05/14/2010    PSA 0.8 06/20/2008           Assessment/Plan:   Anticardiolipin syndrome  Patient has no further bleeding on the prophylactic dose of Xarelto.  I instructed him to continue on the 10mg qHS and watch for any new symptoms such as sob, cp or lower ext swelling or pain.  Will see him again in 4 months with labs.      Discussion:     Return in about 4 months (around 2/24/2018).      Electronically signed by Silvestre Albetr

## 2018-02-02 ENCOUNTER — PATIENT MESSAGE (OUTPATIENT)
Dept: UROLOGY | Facility: CLINIC | Age: 68
End: 2018-02-02

## 2018-02-21 LAB
ALBUMIN SERPL-MCNC: 3.3 G/DL (ref 3.1–4.7)
ALP SERPL-CCNC: 41 IU/L (ref 40–104)
ALT (SGPT): 22 IU/L (ref 3–33)
AST SERPL-CCNC: 25 IU/L (ref 10–40)
BASOPHILS NFR BLD: 0 K/UL (ref 0–0.2)
BASOPHILS NFR BLD: 0.4 %
BILIRUB SERPL-MCNC: 0.8 MG/DL (ref 0.3–1)
BUN SERPL-MCNC: 17 MG/DL (ref 8–20)
CALCIUM SERPL-MCNC: 8.9 MG/DL (ref 7.7–10.4)
CHLORIDE: 105 MMOL/L (ref 98–110)
CO2 SERPL-SCNC: 26.1 MMOL/L (ref 22.8–31.6)
CREATININE: 0.9 MG/DL (ref 0.6–1.4)
EOSINOPHIL NFR BLD: 0.2 K/UL (ref 0–0.7)
EOSINOPHIL NFR BLD: 3.1 %
ERYTHROCYTE [DISTWIDTH] IN BLOOD BY AUTOMATED COUNT: 13.2 % (ref 11.7–14.9)
GLUCOSE: 109 MG/DL (ref 70–99)
GRAN #: 2.6 K/UL (ref 1.4–6.5)
GRAN%: 52.6 %
HCT VFR BLD AUTO: 45 % (ref 39–55)
HGB BLD-MCNC: 15.3 G/DL (ref 14–16)
IMMATURE GRANS (ABS): 0 K/UL (ref 0–1)
IMMATURE GRANULOCYTES: 0.4 %
LYMPH #: 1.6 K/UL (ref 1.2–3.4)
LYMPH%: 33.6 %
MCH RBC QN AUTO: 34.9 PG (ref 25–35)
MCHC RBC AUTO-ENTMCNC: 34 G/DL (ref 31–36)
MCV RBC AUTO: 102.5 FL (ref 80–100)
MONO #: 0.5 K/UL (ref 0.1–0.6)
MONO%: 9.9 %
NUCLEATED RBCS: 0 %
PLATELET # BLD AUTO: 164 K/UL (ref 140–440)
PMV BLD AUTO: 11.3 FL (ref 8.8–12.7)
POTASSIUM SERPL-SCNC: 4.5 MMOL/L (ref 3.5–5)
PROT SERPL-MCNC: 6.2 G/DL (ref 6–8.2)
RBC # BLD AUTO: 4.39 M/UL (ref 4.3–5.9)
SODIUM: 138 MMOL/L (ref 134–144)
WBC # BLD AUTO: 5.1 K/UL (ref 5–10)

## 2018-02-27 ENCOUNTER — OFFICE VISIT (OUTPATIENT)
Dept: HEMATOLOGY/ONCOLOGY | Facility: CLINIC | Age: 68
End: 2018-02-27
Payer: COMMERCIAL

## 2018-02-27 VITALS
HEART RATE: 60 BPM | HEIGHT: 74 IN | BODY MASS INDEX: 37.6 KG/M2 | TEMPERATURE: 98 F | SYSTOLIC BLOOD PRESSURE: 139 MMHG | RESPIRATION RATE: 18 BRPM | DIASTOLIC BLOOD PRESSURE: 90 MMHG | WEIGHT: 293 LBS

## 2018-02-27 DIAGNOSIS — Z86.711 HISTORY OF PULMONARY EMBOLUS (PE): Chronic | ICD-10-CM

## 2018-02-27 DIAGNOSIS — D68.61 ANTICARDIOLIPIN SYNDROME: Chronic | ICD-10-CM

## 2018-02-27 DIAGNOSIS — D75.89 MACROCYTOSIS WITHOUT ANEMIA: Chronic | ICD-10-CM

## 2018-02-27 PROCEDURE — 3008F BODY MASS INDEX DOCD: CPT | Mod: ,,, | Performed by: INTERNAL MEDICINE

## 2018-02-27 PROCEDURE — 99213 OFFICE O/P EST LOW 20 MIN: CPT | Mod: ,,, | Performed by: INTERNAL MEDICINE

## 2018-02-27 RX ORDER — DOXYCYCLINE HYCLATE 100 MG
TABLET ORAL
COMMUNITY
Start: 2018-01-05 | End: 2018-03-07

## 2018-02-27 RX ORDER — METHYLPREDNISOLONE 4 MG/1
TABLET ORAL
COMMUNITY
Start: 2018-01-05 | End: 2018-03-07

## 2018-02-27 RX ORDER — PROMETHAZINE HYDROCHLORIDE AND CODEINE PHOSPHATE 6.25; 1 MG/5ML; MG/5ML
SOLUTION ORAL
COMMUNITY
Start: 2017-12-31 | End: 2018-03-07

## 2018-02-27 RX ORDER — HYDROCODONE POLISTIREX AND CHLORPHENIRAMINE POLISTIREX 10; 8 MG/5ML; MG/5ML
SUSPENSION, EXTENDED RELEASE ORAL
COMMUNITY
Start: 2018-01-05 | End: 2018-03-07

## 2018-02-27 RX ORDER — GABAPENTIN 300 MG/1
CAPSULE ORAL
COMMUNITY
Start: 2017-11-21 | End: 2018-03-07

## 2018-02-27 RX ORDER — ALBUTEROL SULFATE 90 UG/1
AEROSOL, METERED RESPIRATORY (INHALATION)
COMMUNITY
Start: 2018-01-05 | End: 2018-03-07

## 2018-02-27 RX ORDER — CIPROFLOXACIN 500 MG/1
TABLET ORAL
COMMUNITY
Start: 2017-12-14 | End: 2018-03-07

## 2018-02-27 RX ORDER — RIVAROXABAN 15 MG/1
15 TABLET, FILM COATED ORAL DAILY
COMMUNITY
Start: 2018-01-28 | End: 2021-03-01 | Stop reason: SDUPTHER

## 2018-02-27 RX ORDER — CLINDAMYCIN HYDROCHLORIDE 300 MG/1
CAPSULE ORAL
COMMUNITY
Start: 2017-12-14 | End: 2018-03-07

## 2018-02-27 RX ORDER — OSELTAMIVIR PHOSPHATE 75 MG/1
CAPSULE ORAL
COMMUNITY
Start: 2017-12-26 | End: 2018-03-07

## 2018-02-27 RX ORDER — METRONIDAZOLE 500 MG/1
TABLET ORAL
COMMUNITY
Start: 2017-12-14 | End: 2018-03-07

## 2018-02-27 NOTE — LETTER
February 27, 2018      Shlomo Zavala MD  [address]  [place] [address]           [facility]  [address]  [address]  [place] [address]  Phone: [phone]  Fax: [phone]          Patient: Matt Rae   MR Number: [id number]   YOB: 1950   Date of Visit: 2/27/2018       Dear Dr. Shlomo Zavala:    Thank you for referring Matt Rae to me for evaluation. Attached you will find relevant portions of my assessment and plan of care.    If you have questions, please do not hesitate to call me. I look forward to following Matt Rae along with you.    Sincerely,    Silvestre James MD    Enclosure  CC:  No Recipients    If you would like to receive this communication electronically, please contact [email or web address] or [phone] to request more information on [employer] Link access.    For providers and/or their staff who would like to refer a patient to [facility], please contact us through our one-stop-shop provider referral line, [facility], at [phone].    If you feel you have received this communication in error or would no longer like to receive these types of communications, please e-mail [email or web address]

## 2018-02-27 NOTE — PROGRESS NOTES
PROGRESS NOTE    Subjective:       Patient ID: Matt Rae is a 67 y.o. male.    Chief Complaint:  Follow-up and Results (labs in epic)      History of Present Illness:   Matt Rae is a 67 y.o. male who presents with a history of hypercoag state and macrocytosis. Patient is now passing protein in his urine and may need a renal biopsy.  To see him again in one month.       Family and Social history reviewed and is unchanged from 12/9/2014.      ROS:  Review of Systems   Constitutional: Negative for fever and unexpected weight change.   HENT: Negative for nosebleeds.    Respiratory: Negative for chest tightness and shortness of breath.    Cardiovascular: Negative for chest pain.   Gastrointestinal: Negative for abdominal pain and blood in stool.   Genitourinary: Negative for hematuria.   Skin: Negative for rash.   Hematological: Does not bruise/bleed easily.          Current Outpatient Prescriptions:     aspirin (ECOTRIN) 81 MG EC tablet, Take 81 mg by mouth once daily., Disp: , Rfl:     azithromycin (ZITHROMAX) 500 MG tablet, , Disp: , Rfl:     b complex vitamins tablet, Take 1 tablet by mouth once daily., Disp: , Rfl:     captopril (CAPOTEN) 12.5 MG tablet, Take 12.5 mg by mouth 2 (two) times daily. , Disp: , Rfl:     ciprofloxacin HCl (CIPRO) 500 MG tablet, , Disp: , Rfl:     clindamycin (CLEOCIN) 300 MG capsule, , Disp: , Rfl:     doxycycline (VIBRA-TABS) 100 MG tablet, , Disp: , Rfl:     furosemide (LASIX) 20 MG tablet, , Disp: , Rfl: 1    gabapentin (NEURONTIN) 300 MG capsule, , Disp: , Rfl:     hydrocodone-chlorpheniramine (TUSSIONEX) 10-8 mg/5 mL suspension, , Disp: , Rfl:     methocarbamol (ROBAXIN) 750 MG Tab, Take 750 mg by mouth as needed. , Disp: , Rfl:     methylPREDNISolone (MEDROL DOSEPACK) 4 mg tablet, , Disp: , Rfl:     metoprolol succinate (TOPROL-XL) 25 MG 24 hr tablet, Take 25 mg by mouth once daily. , Disp: , Rfl:      "metroNIDAZOLE (FLAGYL) 500 MG tablet, , Disp: , Rfl:     multivitamin capsule, Take 1 capsule by mouth once daily.  , Disp: , Rfl:     oseltamivir (TAMIFLU) 75 MG capsule, , Disp: , Rfl:     potassium chloride (KLOR-CON) 10 MEQ TbSR, , Disp: , Rfl:     promethazine-codeine 6.25-10 mg/5 ml (PHENERGAN WITH CODEINE) 6.25-10 mg/5 mL syrup, , Disp: , Rfl:     rivaroxaban (XARELTO) 10 mg Tab, Take 1 tablet (10 mg total) by mouth daily with dinner or evening meal., Disp: 90 tablet, Rfl: 3    simvastatin (ZOCOR) 10 MG tablet, Take 10 mg by mouth every evening. , Disp: , Rfl:     tramadol (ULTRAM) 50 mg tablet, Take 50 mg by mouth once daily. , Disp: , Rfl:     VENTOLIN HFA 90 mcg/actuation inhaler, , Disp: , Rfl:     VIAGRA 100 mg tablet, , Disp: , Rfl:     XARELTO 15 mg Tab, , Disp: , Rfl:         Objective:       Physical Examination:     BP (!) 139/90   Pulse 60   Temp 98.2 °F (36.8 °C)   Resp 18   Ht 6' 2" (1.88 m)   Wt 132.9 kg (293 lb)   BMI 37.62 kg/m²     Physical Exam   Constitutional: He is oriented to person, place, and time. He appears well-developed and well-nourished.   HENT:   Head: Normocephalic and atraumatic.   Right Ear: External ear normal.   Left Ear: External ear normal.   Mouth/Throat: Oropharynx is clear and moist.   Eyes: Conjunctivae are normal. Pupils are equal, round, and reactive to light. No scleral icterus.   Neck: Normal range of motion. Neck supple.   Cardiovascular: Normal rate, regular rhythm and normal heart sounds.  Exam reveals no gallop and no friction rub.    No murmur heard.  Pulmonary/Chest: Effort normal and breath sounds normal. No respiratory distress. He has no rales. He exhibits no tenderness.   Abdominal: Soft. Bowel sounds are normal. He exhibits no distension and no mass. There is no hepatosplenomegaly. There is no tenderness. There is no rebound and no guarding.   Musculoskeletal: He exhibits no edema.   Lymphadenopathy:        Head (right side): No " tonsillar adenopathy present.        Head (left side): No tonsillar adenopathy present.     He has no cervical adenopathy.     He has no axillary adenopathy.        Right: No supraclavicular adenopathy present.        Left: No supraclavicular adenopathy present.   Neurological: He is alert and oriented to person, place, and time.   Psychiatric: He has a normal mood and affect. His behavior is normal. Judgment and thought content normal.   Vitals reviewed.      Labs:   Recent Results (from the past 336 hour(s))   CBC auto differential    Collection Time: 02/21/18  7:06 AM   Result Value Ref Range    WBC 5.1 5.0 - 10.0 K/uL    Hemoglobin 15.3 14.0 - 16.0 g/dL    Hematocrit 45.0 39.0 - 55.0 %    Platelets 164 140 - 440 K/uL     CMP  Sodium   Date Value Ref Range Status   02/21/2018 138 134 - 144 mmol/L      Potassium   Date Value Ref Range Status   02/21/2018 4.5 3.5 - 5.0 mmol/L      Chloride   Date Value Ref Range Status   02/21/2018 105 98 - 110 mmol/L      CO2   Date Value Ref Range Status   02/21/2018 26.1 22.8 - 31.6 mmol/L      Glucose   Date Value Ref Range Status   02/21/2018 109 (H) 70 - 99 mg/dL      BUN, Bld   Date Value Ref Range Status   02/21/2018 17 8 - 20 mg/dL      Creatinine   Date Value Ref Range Status   02/21/2018 0.90 0.60 - 1.40 mg/dL    01/27/2013 1.0 0.5 - 1.4 mg/dL Final     Calcium   Date Value Ref Range Status   02/21/2018 8.9 7.7 - 10.4 mg/dL    01/27/2013 9.5 8.7 - 10.5 mg/dL Final     Total Protein   Date Value Ref Range Status   02/21/2018 6.2 6.0 - 8.2 g/dL      Albumin   Date Value Ref Range Status   02/21/2018 3.3 3.1 - 4.7 g/dL      Total Bilirubin   Date Value Ref Range Status   02/21/2018 0.8 0.3 - 1.0 mg/dL      Alkaline Phosphatase   Date Value Ref Range Status   02/21/2018 41 40 - 104 IU/L    01/27/2013 63 55 - 135 U/L Final     [facility]   Date Value Ref Range Status   03/09/2016 29 17 - 59 U/L Final     AST   Date Value Ref Range Status   02/21/2018 25 10 - 40 IU/L       ALT   Date Value Ref Range Status   03/09/2016 32 10 - 44 U/L Final     Anion Gap   Date Value Ref Range Status   03/09/2016 13 8 - 16 mmol/L Final   01/27/2013 13 5 - 15 meq/L Final     eGFR if    Date Value Ref Range Status   03/09/2016 >60 >60 mL/min/1.73 m^2 Final     eGFR if non    Date Value Ref Range Status   03/09/2016 >60 >60 mL/min/1.73 m^2 Final     Comment:     Calculation used to obtain the estimated glomerular filtration  rate (eGFR) is the CKD-EPI equation. Since race is unknown   in our information system, the eGFR values for   -American and Non--American patients are given   for each creatinine result.       No results found for: CEA  Lab Results   Component Value Date    PSA 1.0 01/26/2016    PSA 0.57 05/14/2010    PSA 0.8 06/20/2008           Assessment/Plan:   Anticardiolipin syndrome  Patient is now on 15mg of Xarelto per Dr. Cook.  No new nosebleeds and patient is doing well.  Labs are unremarkable.  May need renal biopsy and suggest he d/c the med 3 days prior and resume one to two days after if ok with Renal MD.  Will see patient back again in 6 months with labs otherwise.      History of pulmonary embolus (PE)  No new issues.  Continue anticoagulant.     Macrocytosis without anemia  No anemia and no significant change.  observe    Discussion:     Follow-up in about 6 months (around 8/27/2018).      Electronically signed by Silvestre Albert

## 2018-02-27 NOTE — ASSESSMENT & PLAN NOTE
Patient is now on 15mg of Xarelto per Dr. Cook.  No new nosebleeds and patient is doing well.  Labs are unremarkable.  May need renal biopsy and suggest he d/c the med 3 days prior and resume one to two days after if ok with Renal MD.  Will see patient back again in 6 months with labs otherwise.

## 2018-03-07 ENCOUNTER — OFFICE VISIT (OUTPATIENT)
Dept: UROLOGY | Facility: CLINIC | Age: 68
End: 2018-03-07
Payer: COMMERCIAL

## 2018-03-07 VITALS
HEIGHT: 74 IN | BODY MASS INDEX: 37.72 KG/M2 | SYSTOLIC BLOOD PRESSURE: 130 MMHG | DIASTOLIC BLOOD PRESSURE: 80 MMHG | HEART RATE: 80 BPM | WEIGHT: 293.88 LBS

## 2018-03-07 DIAGNOSIS — R31.9 HEMATURIA, UNSPECIFIED TYPE: Primary | ICD-10-CM

## 2018-03-07 DIAGNOSIS — N40.2 PROSTATE NODULE: ICD-10-CM

## 2018-03-07 DIAGNOSIS — N52.9 ERECTILE DYSFUNCTION, UNSPECIFIED ERECTILE DYSFUNCTION TYPE: ICD-10-CM

## 2018-03-07 LAB
BILIRUB SERPL-MCNC: ABNORMAL MG/DL
BLOOD URINE, POC: ABNORMAL
COLOR, POC UA: YELLOW
GLUCOSE UR QL STRIP: ABNORMAL
KETONES UR QL STRIP: ABNORMAL
LEUKOCYTE ESTERASE URINE, POC: ABNORMAL
NITRITE, POC UA: ABNORMAL
PH, POC UA: 6
PROTEIN, POC: ABNORMAL
SPECIFIC GRAVITY, POC UA: 1.03
UROBILINOGEN, POC UA: ABNORMAL

## 2018-03-07 PROCEDURE — 3075F SYST BP GE 130 - 139MM HG: CPT | Mod: S$GLB,,, | Performed by: UROLOGY

## 2018-03-07 PROCEDURE — 81002 URINALYSIS NONAUTO W/O SCOPE: CPT | Mod: S$GLB,,, | Performed by: UROLOGY

## 2018-03-07 PROCEDURE — 99214 OFFICE O/P EST MOD 30 MIN: CPT | Mod: 25,S$GLB,, | Performed by: UROLOGY

## 2018-03-07 PROCEDURE — 3079F DIAST BP 80-89 MM HG: CPT | Mod: S$GLB,,, | Performed by: UROLOGY

## 2018-03-07 PROCEDURE — 99999 PR PBB SHADOW E&M-EST. PATIENT-LVL III: CPT | Mod: PBBFAC,,, | Performed by: UROLOGY

## 2018-03-07 RX ORDER — TADALAFIL 20 MG/1
20 TABLET ORAL DAILY PRN
Qty: 10 TABLET | Refills: 11 | Status: SHIPPED | OUTPATIENT
Start: 2018-03-07 | End: 2019-03-07

## 2018-03-07 RX ORDER — CHOLECALCIFEROL (VITAMIN D3) 25 MCG
2000 TABLET ORAL DAILY
COMMUNITY
End: 2019-08-06

## 2018-03-07 NOTE — PROGRESS NOTES
UROLOGY [place]  3 7 18    c-c bph, urolithiasis    Age 67, comes in to have a urologic check. Health problems that have happened recently have been a severe nose bleed that needed ER visit at [facility] and pt went home after they treated it with a medicated spray; also has needed a change in the xarelto dosing, and has had a recurrence since. He also had swelling of the lower extremities secondary to proteinuria and needed to go to nephrology. His legs improved.    From the urologic standpoint has been voiding well, nocturia x 0-1, no burning or pains; no urgency or incontinence.     Has been followed for stone disease in the past but has not had a recurrence of this since he had it about 4 years ago.     He was also thought to have a prostate nodule, with a single nodule located in the midline at the very apex of the gland which has remained unchanged for years; the psa was 0.86 one year ago       [facility]     Surgical:  has a past surgical history that includes Rt shoulder; Knee arthroscopy; Lithotripsy; Cystoscopy; Kidney stone surgery; removal of colon polyp; Back surgery (9/2010); Joint replacement (2009); and Radiofrequency ablation (2016).     Medical:  has a past medical history of Arthritis; Blood clotting tendency; Cancer; Coronary artery disease; Heart attack; Hypertension; Joint pain; Keloid cicatrix; Kidney stone; Neoplastic syndrome; Pulmonary embolism; Renal stone; and Wears glasses.     Familial: father had nephrolithiasis     Social: , lives in [place], works in fire protection            Current Outpatient Prescriptions on File Prior to Visit   Medication Sig Dispense Refill    aspirin (ECOTRIN) 81 MG EC tablet Take 81 mg by mouth once daily.        b complex vitamins tablet Take 1         captopril (CAPOTEN) 12.5 MG tablet Take 12.5 mg by mouth 2 (tw        methocarbamol (ROBAXIN) 750 MG Tab Take 750 mg by mouth as needed.         metoprolol succinate (TOPROL-XL) 25 MG 24 hr tablet Take 25 mg  by mouth once daily.         multivitamin capsule Take 1 capsul        rivaroxaban (XARELTO) 20 mg Tab Take 20 mg by mouth once daily.        simvastatin (ZOCOR) 10 MG tablet Take 10 mg by mouth every evening.         tramadol (ULTRAM) 50 mg tablet Take 50 mg by m        VIAGRA 100 mg tablet         ROS:  Denies malaise, headaches, eye symptoms, difficulty swallowing or breathing problems.   No chest pains or palpitations.   No change in bowel habits, no tarry stools or hematochezia. No acid reflux.  No genital lesions.  No bleeding disorders, no seizures.  Psych: normal mood, normal affect     Pt alert, oriented, no distress  HEENT: wnl.  Neck: supple, no JVD, no lymphadenopathy  Chest: CV NSR  Lungs: normal auscultation  Abdomen flat, nontender, no organomegaly, no masses.  No hernias  Penis noncircumcised, meatus nl  Testes nl, epi nl, scrotum nl  MILVIA: anus nl, sphincter nl tone, mucosa without lesions, prostate 30 gm, symmetric, 8 mm nodule at the apex, no other nodules. Has not changed since detected years ago  Extremities: no edema, peripheral pulses nl  Neuro: preserved     IMP  Prostate nodule  bph  Nephrolithiasis     I am sending for psa update. May need to do prostate biopsy if the psa rises significantly

## 2018-09-04 LAB
ALBUMIN SERPL-MCNC: 3.5 G/DL (ref 3.1–4.7)
ALP SERPL-CCNC: 43 IU/L (ref 40–104)
ALT (SGPT): 23 IU/L (ref 3–33)
AST SERPL-CCNC: 20 IU/L (ref 10–40)
BASOPHILS NFR BLD: 0 K/UL (ref 0–0.2)
BASOPHILS NFR BLD: 0.6 %
BILIRUB SERPL-MCNC: 0.8 MG/DL (ref 0.3–1)
BUN SERPL-MCNC: 18 MG/DL (ref 8–20)
CALCIUM SERPL-MCNC: 8.9 MG/DL (ref 7.7–10.4)
CHLORIDE: 104 MMOL/L (ref 98–110)
CO2 SERPL-SCNC: 28.4 MMOL/L (ref 22.8–31.6)
CREATININE: 0.91 MG/DL (ref 0.6–1.4)
EOSINOPHIL NFR BLD: 0.1 K/UL (ref 0–0.7)
EOSINOPHIL NFR BLD: 1.4 %
ERYTHROCYTE [DISTWIDTH] IN BLOOD BY AUTOMATED COUNT: 13.4 % (ref 11.7–14.9)
GLUCOSE: 108 MG/DL (ref 70–99)
GRAN #: 3.5 K/UL (ref 1.4–6.5)
GRAN%: 55.9 %
HCT VFR BLD AUTO: 48.6 % (ref 39–55)
HGB BLD-MCNC: 16.3 G/DL (ref 14–16)
IMMATURE GRANS (ABS): 0 K/UL (ref 0–1)
IMMATURE GRANULOCYTES: 0.5 %
LYMPH #: 2.1 K/UL (ref 1.2–3.4)
LYMPH%: 33.2 %
MCH RBC QN AUTO: 34.3 PG (ref 25–35)
MCHC RBC AUTO-ENTMCNC: 33.5 G/DL (ref 31–36)
MCV RBC AUTO: 102.3 FL (ref 80–100)
MONO #: 0.5 K/UL (ref 0.1–0.6)
MONO%: 8.4 %
NUCLEATED RBCS: 0 %
PLATELET # BLD AUTO: 150 K/UL (ref 140–440)
PMV BLD AUTO: 10.8 FL (ref 8.8–12.7)
POTASSIUM SERPL-SCNC: 4.2 MMOL/L (ref 3.5–5)
PROT SERPL-MCNC: 6.5 G/DL (ref 6–8.2)
RBC # BLD AUTO: 4.75 M/UL (ref 4.3–5.9)
SODIUM: 139 MMOL/L (ref 134–144)
WBC # BLD AUTO: 6.3 K/UL (ref 5–10)

## 2018-09-11 ENCOUNTER — OFFICE VISIT (OUTPATIENT)
Dept: HEMATOLOGY/ONCOLOGY | Facility: CLINIC | Age: 68
End: 2018-09-11
Payer: COMMERCIAL

## 2018-09-11 VITALS
HEART RATE: 64 BPM | WEIGHT: 291.5 LBS | RESPIRATION RATE: 18 BRPM | TEMPERATURE: 98 F | BODY MASS INDEX: 38.63 KG/M2 | DIASTOLIC BLOOD PRESSURE: 88 MMHG | SYSTOLIC BLOOD PRESSURE: 163 MMHG | HEIGHT: 73 IN

## 2018-09-11 DIAGNOSIS — D68.61 ANTICARDIOLIPIN SYNDROME: Chronic | ICD-10-CM

## 2018-09-11 DIAGNOSIS — Z86.711 HISTORY OF PULMONARY EMBOLUS (PE): Chronic | ICD-10-CM

## 2018-09-11 DIAGNOSIS — D75.89 MACROCYTOSIS WITHOUT ANEMIA: Chronic | ICD-10-CM

## 2018-09-11 PROBLEM — R58 BLEEDING: Chronic | Status: RESOLVED | Noted: 2017-09-25 | Resolved: 2018-09-11

## 2018-09-11 PROCEDURE — 3077F SYST BP >= 140 MM HG: CPT | Mod: ,,, | Performed by: INTERNAL MEDICINE

## 2018-09-11 PROCEDURE — 1101F PT FALLS ASSESS-DOCD LE1/YR: CPT | Mod: ,,, | Performed by: INTERNAL MEDICINE

## 2018-09-11 PROCEDURE — 99213 OFFICE O/P EST LOW 20 MIN: CPT | Mod: ,,, | Performed by: INTERNAL MEDICINE

## 2018-09-11 PROCEDURE — 3079F DIAST BP 80-89 MM HG: CPT | Mod: ,,, | Performed by: INTERNAL MEDICINE

## 2018-09-11 NOTE — PROGRESS NOTES
PROGRESS NOTE    Subjective:       Patient ID: Matt Rae is a 68 y.o. male.    Chief Complaint:  Follow-up and Results  Hypercoag state, prior PE.      History of Present Illness:   Matt Rae is a 68 y.o. male who presents with a history of hypercoag state and macrocytosis. No new complaints at this time.  .       Family and Social history reviewed and is unchanged from 12/9/2014.      ROS:  Review of Systems   Constitutional: Negative for fever and unexpected weight change.   HENT: Negative for nosebleeds.    Respiratory: Negative for chest tightness and shortness of breath.    Cardiovascular: Negative for chest pain.   Gastrointestinal: Negative for abdominal pain and blood in stool.   Genitourinary: Negative for hematuria.   Skin: Negative for rash.   Hematological: Does not bruise/bleed easily.          Current Outpatient Medications:     aspirin (ECOTRIN) 81 MG EC tablet, Take 81 mg by mouth once daily., Disp: , Rfl:     b complex vitamins tablet, Take 1 tablet by mouth once daily., Disp: , Rfl:     captopril (CAPOTEN) 12.5 MG tablet, Take 12.5 mg by mouth 2 (two) times daily. , Disp: , Rfl:     furosemide (LASIX) 20 MG tablet, , Disp: , Rfl: 1    methocarbamol (ROBAXIN) 750 MG Tab, Take 750 mg by mouth as needed. , Disp: , Rfl:     metoprolol succinate (TOPROL-XL) 25 MG 24 hr tablet, Take 25 mg by mouth once daily. , Disp: , Rfl:     multivitamin capsule, Take 1 capsule by mouth once daily.  , Disp: , Rfl:     potassium chloride (KLOR-CON) 10 MEQ TbSR, , Disp: , Rfl:     simvastatin (ZOCOR) 10 MG tablet, Take 10 mg by mouth every evening. , Disp: , Rfl:     tadalafil (CIALIS) 20 MG Tab, Take 1 tablet (20 mg total) by mouth daily as needed., Disp: 10 tablet, Rfl: 11    tramadol (ULTRAM) 50 mg tablet, Take 50 mg by mouth once daily. , Disp: , Rfl:     vitamin D 1000 units Tab, Take 1,000 Units by mouth once daily., Disp: , Rfl:      "XARELTO 15 mg Tab, , Disp: , Rfl:         Objective:       Physical Examination:     BP (!) 163/88   Pulse 64   Temp 97.8 °F (36.6 °C)   Resp 18   Ht 6' 1" (1.854 m)   Wt 132.2 kg (291 lb 8 oz)   BMI 38.46 kg/m²     Physical Exam   Constitutional: He is oriented to person, place, and time. He appears well-developed and well-nourished.   HENT:   Head: Normocephalic and atraumatic.   Right Ear: External ear normal.   Left Ear: External ear normal.   Mouth/Throat: Oropharynx is clear and moist.   Eyes: Conjunctivae are normal. Pupils are equal, round, and reactive to light. No scleral icterus.   Neck: Normal range of motion. Neck supple.   Cardiovascular: Normal rate, regular rhythm and normal heart sounds. Exam reveals no gallop and no friction rub.   No murmur heard.  Pulmonary/Chest: Effort normal and breath sounds normal. No respiratory distress. He has no rales. He exhibits no tenderness.   Abdominal: Soft. Bowel sounds are normal. He exhibits no distension and no mass. There is no hepatosplenomegaly. There is no tenderness. There is no rebound and no guarding.   Musculoskeletal: He exhibits no edema.   Lymphadenopathy:        Head (right side): No tonsillar adenopathy present.        Head (left side): No tonsillar adenopathy present.     He has no cervical adenopathy.     He has no axillary adenopathy.        Right: No supraclavicular adenopathy present.        Left: No supraclavicular adenopathy present.   Neurological: He is alert and oriented to person, place, and time.   Psychiatric: He has a normal mood and affect. His behavior is normal. Judgment and thought content normal.   Vitals reviewed.      Labs:   Recent Results (from the past 336 hour(s))   CBC auto differential    Collection Time: 09/04/18  6:46 AM   Result Value Ref Range    WBC 6.3 5.0 - 10.0 K/uL    Hemoglobin 16.3 (H) 14.0 - 16.0 g/dL    Hematocrit 48.6 39.0 - 55.0 %    Platelets 150 140 - 440 K/uL     CMP  Sodium   Date Value Ref Range " Status   09/04/2018 139 134 - 144 mmol/L      Potassium   Date Value Ref Range Status   09/04/2018 4.2 3.5 - 5.0 mmol/L      Chloride   Date Value Ref Range Status   09/04/2018 104 98 - 110 mmol/L      CO2   Date Value Ref Range Status   09/04/2018 28.4 22.8 - 31.6 mmol/L      Glucose   Date Value Ref Range Status   09/04/2018 108 (H) 70 - 99 mg/dL      BUN, Bld   Date Value Ref Range Status   09/04/2018 18 8 - 20 mg/dL      Creatinine   Date Value Ref Range Status   09/04/2018 0.91 0.60 - 1.40 mg/dL    01/27/2013 1.0 0.5 - 1.4 mg/dL Final     Calcium   Date Value Ref Range Status   09/04/2018 8.9 7.7 - 10.4 mg/dL    01/27/2013 9.5 8.7 - 10.5 mg/dL Final     Total Protein   Date Value Ref Range Status   09/04/2018 6.5 6.0 - 8.2 g/dL      Albumin   Date Value Ref Range Status   09/04/2018 3.5 3.1 - 4.7 g/dL      Total Bilirubin   Date Value Ref Range Status   09/04/2018 0.8 0.3 - 1.0 mg/dL      Alkaline Phosphatase   Date Value Ref Range Status   09/04/2018 43 40 - 104 IU/L    01/27/2013 63 55 - 135 U/L Final     AST ([place])   Date Value Ref Range Status   03/09/2016 29 17 - 59 U/L Final     AST   Date Value Ref Range Status   09/04/2018 20 10 - 40 IU/L      ALT   Date Value Ref Range Status   03/09/2016 32 10 - 44 U/L Final     Anion Gap   Date Value Ref Range Status   03/09/2016 13 8 - 16 mmol/L Final   01/27/2013 13 5 - 15 meq/L Final     eGFR if    Date Value Ref Range Status   03/09/2016 >60 >60 mL/min/1.73 m^2 Final     eGFR if non    Date Value Ref Range Status   03/09/2016 >60 >60 mL/min/1.73 m^2 Final     Comment:     Calculation used to obtain the estimated glomerular filtration  rate (eGFR) is the CKD-EPI equation. Since race is unknown   in our information system, the eGFR values for   -American and Non--American patients are given   for each creatinine result.       No results found for: CEA  Lab Results   Component Value Date    PSA 1.0 01/26/2016     PSA 0.57 05/14/2010    PSA 0.8 06/20/2008           Assessment/Plan:   Anticardiolipin syndrome  Patient is doing well.  No new complaints at this time and labs look good.  Patient continues on Xarelto 15mg daily and has no new issues with this.  No epistaxis in over a year.      History of pulmonary embolus (PE)  Patient is doing well from this standpoint with no new symptoms or signs of new PE currently.  Will continue to watch.      Macrocytosis without anemia  Patient remains macrocytic at this time and Hb is up slightly.  No change in the wbc or platelets and at this time I will monitor with continued lab work.      Discussion:     Follow-up in about 6 months (around 3/11/2019).      Electronically signed by Silvestre Albert

## 2018-09-11 NOTE — ASSESSMENT & PLAN NOTE
Patient is doing well from this standpoint with no new symptoms or signs of new PE currently.  Will continue to watch.

## 2018-09-11 NOTE — LETTER
September 11, 2018      Shlomo Zavala MD  [address]  [place] [address]           [facility]ology [facility]  [address]  [address]  [place] [address]  Phone: [phone]  Fax: [phone]          Patient: Matt Rae   MR Number: [id number]   YOB: 1950   Date of Visit: 9/11/2018       Dear Dr. Shlomo Zavala:    Thank you for referring Matt Rae to me for evaluation. Attached you will find relevant portions of my assessment and plan of care.    If you have questions, please do not hesitate to call me. I look forward to following Matt Rae along with you.    Sincerely,    Silvestre James MD    Enclosure  CC:  No Recipients    If you would like to receive this communication electronically, please contact [email or web address] or [phone] to request more information on [employer] Link access.    For providers and/or their staff who would like to refer a patient to [facility], please contact us through our one-stop-shop provider referral line, [facility], at [phone].    If you feel you have received this communication in error or would no longer like to receive these types of communications, please e-mail [email or web address]

## 2018-09-11 NOTE — ASSESSMENT & PLAN NOTE
Patient remains macrocytic at this time and Hb is up slightly.  No change in the wbc or platelets and at this time I will monitor with continued lab work.

## 2018-09-11 NOTE — ASSESSMENT & PLAN NOTE
Patient is doing well.  No new complaints at this time and labs look good.  Patient continues on Xarelto 15mg daily and has no new issues with this.  No epistaxis in over a year.

## 2019-01-07 DIAGNOSIS — N40.0 BENIGN PROSTATIC HYPERPLASIA, UNSPECIFIED WHETHER LOWER URINARY TRACT SYMPTOMS PRESENT: Primary | ICD-10-CM

## 2019-02-21 ENCOUNTER — OFFICE VISIT (OUTPATIENT)
Dept: UROLOGY | Facility: CLINIC | Age: 69
End: 2019-02-21
Payer: COMMERCIAL

## 2019-02-21 VITALS
HEART RATE: 67 BPM | BODY MASS INDEX: 37.81 KG/M2 | DIASTOLIC BLOOD PRESSURE: 86 MMHG | SYSTOLIC BLOOD PRESSURE: 139 MMHG | WEIGHT: 285.31 LBS | HEIGHT: 73 IN

## 2019-02-21 DIAGNOSIS — N40.0 BENIGN PROSTATIC HYPERPLASIA WITHOUT LOWER URINARY TRACT SYMPTOMS: Primary | ICD-10-CM

## 2019-02-21 DIAGNOSIS — N40.1 BENIGN PROSTATIC HYPERPLASIA WITH URINARY OBSTRUCTION: ICD-10-CM

## 2019-02-21 DIAGNOSIS — R35.1 NOCTURIA: ICD-10-CM

## 2019-02-21 DIAGNOSIS — N13.8 BENIGN PROSTATIC HYPERPLASIA WITH URINARY OBSTRUCTION: ICD-10-CM

## 2019-02-21 LAB
BILIRUB SERPL-MCNC: NORMAL MG/DL
BLOOD URINE, POC: NORMAL
COLOR, POC UA: YELLOW
GLUCOSE UR QL STRIP: NORMAL
KETONES UR QL STRIP: NORMAL
LEUKOCYTE ESTERASE URINE, POC: NORMAL
NITRITE, POC UA: NORMAL
PH, POC UA: 5.5
PROTEIN, POC: >300
SPECIFIC GRAVITY, POC UA: >1.03
UROBILINOGEN, POC UA: NORMAL

## 2019-02-21 PROCEDURE — 81002 URINALYSIS NONAUTO W/O SCOPE: CPT | Mod: S$GLB,,, | Performed by: UROLOGY

## 2019-02-21 PROCEDURE — 99214 PR OFFICE/OUTPT VISIT, EST, LEVL IV, 30-39 MIN: ICD-10-PCS | Mod: 25,S$GLB,, | Performed by: UROLOGY

## 2019-02-21 PROCEDURE — 3079F PR MOST RECENT DIASTOLIC BLOOD PRESSURE 80-89 MM HG: ICD-10-PCS | Mod: CPTII,S$GLB,, | Performed by: UROLOGY

## 2019-02-21 PROCEDURE — 3079F DIAST BP 80-89 MM HG: CPT | Mod: CPTII,S$GLB,, | Performed by: UROLOGY

## 2019-02-21 PROCEDURE — 3075F SYST BP GE 130 - 139MM HG: CPT | Mod: CPTII,S$GLB,, | Performed by: UROLOGY

## 2019-02-21 PROCEDURE — 81002 POCT URINE DIPSTICK WITHOUT MICROSCOPE: ICD-10-PCS | Mod: S$GLB,,, | Performed by: UROLOGY

## 2019-02-21 PROCEDURE — 3075F PR MOST RECENT SYSTOLIC BLOOD PRESS GE 130-139MM HG: ICD-10-PCS | Mod: CPTII,S$GLB,, | Performed by: UROLOGY

## 2019-02-21 PROCEDURE — 99214 OFFICE O/P EST MOD 30 MIN: CPT | Mod: 25,S$GLB,, | Performed by: UROLOGY

## 2019-02-21 PROCEDURE — 1101F PR PT FALLS ASSESS DOC 0-1 FALLS W/OUT INJ PAST YR: ICD-10-PCS | Mod: CPTII,S$GLB,, | Performed by: UROLOGY

## 2019-02-21 PROCEDURE — 99999 PR PBB SHADOW E&M-EST. PATIENT-LVL III: ICD-10-PCS | Mod: PBBFAC,,, | Performed by: UROLOGY

## 2019-02-21 PROCEDURE — 1101F PT FALLS ASSESS-DOCD LE1/YR: CPT | Mod: CPTII,S$GLB,, | Performed by: UROLOGY

## 2019-02-21 PROCEDURE — 99999 PR PBB SHADOW E&M-EST. PATIENT-LVL III: CPT | Mod: PBBFAC,,, | Performed by: UROLOGY

## 2019-02-21 NOTE — PROGRESS NOTES
UROLOGY [place]  2 [birth year] 19     c-c bph, urolithiasis     Age 68, comes in as routine yearly follow up. He is depressed now because his company just told him that he is being laid off, and he was not ready to retire.     His health has been stable, has not had any new stone episodes of flank pain or of gross hematuria     Voiding is weak, nocturia x 1, daytime frequency is normal, except when he takes the diuretic, when he starts to void very often for a while. Sleeps with cpap and sleeps very well    He has also been known to have a prostate nodule, with a single nodule located in the midline at the very apex of the gland which has remained unchanged for years; the psa was 0.86 in nov 2016, and recently had it updated at [facility] and it was normal        [facility]     Surgical:  has a past surgical history that includes Rt shoulder; Knee arthroscopy; Lithotripsy; Cystoscopy; Kidney stone surgery; removal of colon polyp; Back surgery (9/2010); Joint replacement (2009); and Radiofrequency ablation (2016).     Medical:  has a past medical history of Arthritis; Blood clotting tendency; Cancer; Coronary artery disease; Heart attack; Hypertension; Joint pain; Keloid cicatrix; Kidney stone; Neoplastic syndrome; Pulmonary embolism; Renal stone; and Wears glasses.     Familial: father had nephrolithiasis     Social: , lives in [place], works in fire protection                 Current Outpatient Prescriptions on File Prior to Visit   Medication Sig Dispense Refill    aspirin (ECOTRIN) 81 MG EC tablet Take 81 mg by mouth once daily.        b complex vitamins tablet Take 1         captopril (CAPOTEN) 12.5 MG tablet Take 12.5 mg by mouth 2 (tw        methocarbamol (ROBAXIN) 750 MG Tab Take 750 mg by mouth as needed.         metoprolol succinate (TOPROL-XL) 25 MG 24 hr tablet Take 25 mg by mouth once daily.         multivitamin capsule Take 1 capsul        rivaroxaban (XARELTO) 20 mg Tab Take 20 mg by mouth once daily.         simvastatin (ZOCOR) 10 MG tablet Take 10 mg by mouth every evening.         tramadol (ULTRAM) 50 mg tablet Take 50 mg by m        VIAGRA 100 mg tablet         ROS:  Denies malaise, headaches, eye symptoms, difficulty swallowing or breathing problems.   No chest pains or palpitations.   No change in bowel habits, no tarry stools or hematochezia. No acid reflux.  No genital lesions.  No bleeding disorders, no seizures.  Psych: slightly depressed from being laid off, otherwise normal affect     Pt alert, oriented, no distress  HEENT: wnl.  Neck: supple, no JVD, no lymphadenopathy  Chest: CV NSR  Lungs: normal chest expansion  Abdomen prominent, nontender, no organomegaly, no masses.  No hernias  Penis noncircumcised, meatus nl  Testes nl, epi nl, scrotum nl  MILVIA: anus nl, sphincter nl tone, mucosa without lesions, prostate 30 gm, symmetric, 8 mm nodule at the apex, no change at all in it. No other nodules.   Extremities: no edema, peripheral pulses nl  Neuro: preserved     IMP  Prostate nodule  bph  Nephrolithiasis     I recommend that he brings the [facility] results of his psa, for our records  RTC yearly

## 2019-03-07 LAB
ALBUMIN SERPL-MCNC: 3.6 G/DL (ref 3.1–4.7)
ALP SERPL-CCNC: 51 IU/L (ref 40–104)
ALT (SGPT): 28 IU/L (ref 3–33)
AST SERPL-CCNC: 30 IU/L (ref 10–40)
BILIRUB SERPL-MCNC: 0.7 MG/DL (ref 0.3–1)
BUN SERPL-MCNC: 21 MG/DL (ref 8–20)
CALCIUM SERPL-MCNC: 9 MG/DL (ref 7.7–10.4)
CHLORIDE: 106 MMOL/L (ref 98–110)
CO2 SERPL-SCNC: 27.3 MMOL/L (ref 22.8–31.6)
CREATININE: 0.99 MG/DL (ref 0.6–1.4)
GLUCOSE: 125 MG/DL (ref 70–99)
POTASSIUM SERPL-SCNC: 4.3 MMOL/L (ref 3.5–5)
PROT SERPL-MCNC: 6.9 G/DL (ref 6–8.2)
SODIUM: 139 MMOL/L (ref 134–144)

## 2019-03-12 ENCOUNTER — OFFICE VISIT (OUTPATIENT)
Dept: HEMATOLOGY/ONCOLOGY | Facility: CLINIC | Age: 69
End: 2019-03-12
Payer: COMMERCIAL

## 2019-03-12 VITALS
RESPIRATION RATE: 20 BRPM | WEIGHT: 286.69 LBS | HEART RATE: 54 BPM | BODY MASS INDEX: 37.83 KG/M2 | DIASTOLIC BLOOD PRESSURE: 88 MMHG | TEMPERATURE: 99 F | SYSTOLIC BLOOD PRESSURE: 146 MMHG

## 2019-03-12 DIAGNOSIS — Z86.711 HISTORY OF PULMONARY EMBOLUS (PE): Chronic | ICD-10-CM

## 2019-03-12 DIAGNOSIS — D68.61 ANTICARDIOLIPIN SYNDROME: Chronic | ICD-10-CM

## 2019-03-12 DIAGNOSIS — D75.89 MACROCYTOSIS WITHOUT ANEMIA: Chronic | ICD-10-CM

## 2019-03-12 PROCEDURE — 3077F SYST BP >= 140 MM HG: CPT | Mod: ,,, | Performed by: INTERNAL MEDICINE

## 2019-03-12 PROCEDURE — 1100F PR PT FALLS ASSESS DOC 2+ FALLS/FALL W/INJURY/YR: ICD-10-PCS | Mod: ,,, | Performed by: INTERNAL MEDICINE

## 2019-03-12 PROCEDURE — 99213 OFFICE O/P EST LOW 20 MIN: CPT | Mod: ,,, | Performed by: INTERNAL MEDICINE

## 2019-03-12 PROCEDURE — 3288F FALL RISK ASSESSMENT DOCD: CPT | Mod: ,,, | Performed by: INTERNAL MEDICINE

## 2019-03-12 PROCEDURE — 3288F PR FALLS RISK ASSESSMENT DOCUMENTED: ICD-10-PCS | Mod: ,,, | Performed by: INTERNAL MEDICINE

## 2019-03-12 PROCEDURE — 1100F PTFALLS ASSESS-DOCD GE2>/YR: CPT | Mod: ,,, | Performed by: INTERNAL MEDICINE

## 2019-03-12 PROCEDURE — 3077F PR MOST RECENT SYSTOLIC BLOOD PRESSURE >= 140 MM HG: ICD-10-PCS | Mod: ,,, | Performed by: INTERNAL MEDICINE

## 2019-03-12 PROCEDURE — 99213 PR OFFICE/OUTPT VISIT, EST, LEVL III, 20-29 MIN: ICD-10-PCS | Mod: ,,, | Performed by: INTERNAL MEDICINE

## 2019-03-12 PROCEDURE — 3079F DIAST BP 80-89 MM HG: CPT | Mod: ,,, | Performed by: INTERNAL MEDICINE

## 2019-03-12 PROCEDURE — 3079F PR MOST RECENT DIASTOLIC BLOOD PRESSURE 80-89 MM HG: ICD-10-PCS | Mod: ,,, | Performed by: INTERNAL MEDICINE

## 2019-03-12 NOTE — LETTER
March 12, 2019      Shlomo Zavala MD  [address]  [place] [address]           [facility]ematology [facility]  [address]  [address]  [place] [address]  Phone: [phone]  Fax: [phone]          Patient: Matt Rae   MR Number: [id number]   YOB: 1950   Date of Visit: 3/12/2019       Dear Dr. Shlomo Zavala:    Thank you for referring Matt Rae to me for evaluation. Attached you will find relevant portions of my assessment and plan of care.    If you have questions, please do not hesitate to call me. I look forward to following Matt Rae along with you.    Sincerely,    Silvestre James MD    Enclosure  CC:  No Recipients    If you would like to receive this communication electronically, please contact [email or web address] or [phone] to request more information on [employer] Link access.    For providers and/or their staff who would like to refer a patient to [facility], please contact us through our one-stop-shop provider referral line, [facility], at [phone].    If you feel you have received this communication in error or would no longer like to receive these types of communications, please e-mail [email or web address]

## 2019-03-12 NOTE — ASSESSMENT & PLAN NOTE
CBC shows a continued high MCV but no anemia or cytopenia otherwise.  His Hb is slightly elevated at this time and will continue to watch this.

## 2019-03-12 NOTE — PROGRESS NOTES
PROGRESS NOTE    Subjective:       Patient ID: Matt Rae is a 68 y.o. male.    Chief Complaint:  Follow-up  Hypercoag state, prior PE.      History of Present Illness:   Matt Rae is a 68 y.o. male who presents with a history of hypercoag state and macrocytosis.  Patient is doing well without new complaints at this time.     Family and Social history reviewed and is unchanged from 12/9/2014.      ROS:  Review of Systems   Constitutional: Negative for fever and unexpected weight change.   HENT: Negative for nosebleeds.    Respiratory: Negative for chest tightness and shortness of breath.    Cardiovascular: Negative for chest pain.   Gastrointestinal: Negative for abdominal pain and blood in stool.   Genitourinary: Negative for hematuria.   Skin: Negative for rash.   Hematological: Does not bruise/bleed easily.          Current Outpatient Medications:     aspirin (ECOTRIN) 81 MG EC tablet, Take 81 mg by mouth once daily., Disp: , Rfl:     b complex vitamins tablet, Take 1 tablet by mouth once daily., Disp: , Rfl:     captopril (CAPOTEN) 12.5 MG tablet, Take 12.5 mg by mouth 2 (two) times daily. , Disp: , Rfl:     furosemide (LASIX) 20 MG tablet, 20 mg 3 (three) times a week. Monday, Wednesday, Friday, Disp: , Rfl: 1    methocarbamol (ROBAXIN) 750 MG Tab, Take 750 mg by mouth as needed. , Disp: , Rfl:     metoprolol succinate (TOPROL-XL) 25 MG 24 hr tablet, Take 25 mg by mouth once daily. , Disp: , Rfl:     multivitamin capsule, Take 1 capsule by mouth once daily.  , Disp: , Rfl:     potassium chloride (KLOR-CON) 10 MEQ TbSR, 10 mEq. Take with Lasix, Disp: , Rfl:     simvastatin (ZOCOR) 10 MG tablet, Take 10 mg by mouth every evening. , Disp: , Rfl:     tramadol (ULTRAM) 50 mg tablet, Take 50 mg by mouth once daily. , Disp: , Rfl:     vitamin D 1000 units Tab, Take 2,000 Units by mouth once daily. , Disp: , Rfl:     XARELTO 15 mg Tab, 15 mg  once daily. , Disp: , Rfl:     tadalafil (CIALIS) 20 MG Tab, Take 1 tablet (20 mg total) by mouth daily as needed., Disp: 10 tablet, Rfl: 11        Objective:       Physical Examination:     BP (!) 146/88   Pulse (!) 54   Temp 98.6 °F (37 °C) (Oral)   Resp 20   Wt 130 kg (286 lb 11.2 oz)   BMI 37.83 kg/m²     Physical Exam   Constitutional: He is oriented to person, place, and time. He appears well-developed and well-nourished.   HENT:   Head: Normocephalic and atraumatic.   Right Ear: External ear normal.   Left Ear: External ear normal.   Mouth/Throat: Oropharynx is clear and moist.   Eyes: Conjunctivae are normal. Pupils are equal, round, and reactive to light. No scleral icterus.   Neck: Normal range of motion. Neck supple.   Cardiovascular: Normal rate, regular rhythm and normal heart sounds. Exam reveals no gallop and no friction rub.   No murmur heard.  Pulmonary/Chest: Effort normal and breath sounds normal. No respiratory distress. He has no rales. He exhibits no tenderness.   Abdominal: Soft. Bowel sounds are normal. He exhibits no distension and no mass. There is no hepatosplenomegaly. There is no tenderness. There is no rebound and no guarding.   Musculoskeletal: He exhibits no edema.   Lymphadenopathy:        Head (right side): No tonsillar adenopathy present.        Head (left side): No tonsillar adenopathy present.     He has no cervical adenopathy.     He has no axillary adenopathy.        Right: No supraclavicular adenopathy present.        Left: No supraclavicular adenopathy present.   Neurological: He is alert and oriented to person, place, and time.   Psychiatric: He has a normal mood and affect. His behavior is normal. Judgment and thought content normal.   Vitals reviewed.      Labs:   No results found for this or any previous visit (from the past 336 hour(s)).  CMP  Sodium   Date Value Ref Range Status   03/07/2019 139 134 - 144 mmol/L      Potassium   Date Value Ref Range Status    03/07/2019 4.3 3.5 - 5.0 mmol/L      Chloride   Date Value Ref Range Status   03/07/2019 106 98 - 110 mmol/L      CO2   Date Value Ref Range Status   03/07/2019 27.3 22.8 - 31.6 mmol/L      Glucose   Date Value Ref Range Status   03/07/2019 125 (H) 70 - 99 mg/dL      BUN, Bld   Date Value Ref Range Status   03/07/2019 21 (H) 8 - 20 mg/dL      Creatinine   Date Value Ref Range Status   03/07/2019 0.99 0.60 - 1.40 mg/dL    01/27/2013 1.0 0.5 - 1.4 mg/dL Final     Calcium   Date Value Ref Range Status   03/07/2019 9.0 7.7 - 10.4 mg/dL    01/27/2013 9.5 8.7 - 10.5 mg/dL Final     Total Protein   Date Value Ref Range Status   03/07/2019 6.9 6.0 - 8.2 g/dL      Albumin   Date Value Ref Range Status   03/07/2019 3.6 3.1 - 4.7 g/dL      Total Bilirubin   Date Value Ref Range Status   03/07/2019 0.7 0.3 - 1.0 mg/dL      Alkaline Phosphatase   Date Value Ref Range Status   03/07/2019 51 40 - 104 IU/L    01/27/2013 63 55 - 135 U/L Final     AST ([place])   Date Value Ref Range Status   03/09/2016 29 17 - 59 U/L Final     AST   Date Value Ref Range Status   03/07/2019 30 10 - 40 IU/L      ALT   Date Value Ref Range Status   03/09/2016 32 10 - 44 U/L Final     Anion Gap   Date Value Ref Range Status   03/09/2016 13 8 - 16 mmol/L Final   01/27/2013 13 5 - 15 meq/L Final     eGFR if    Date Value Ref Range Status   03/09/2016 >60 >60 mL/min/1.73 m^2 Final     eGFR if non    Date Value Ref Range Status   03/09/2016 >60 >60 mL/min/1.73 m^2 Final     Comment:     Calculation used to obtain the estimated glomerular filtration  rate (eGFR) is the CKD-EPI equation. Since race is unknown   in our information system, the eGFR values for   -American and Non--American patients are given   for each creatinine result.       No results found for: CEA  Lab Results   Component Value Date    PSA 1.0 01/26/2016    PSA 0.57 05/14/2010    PSA 0.8 06/20/2008           Assessment/Plan:    Anticardiolipin syndrome  Patient remains on Xarelto at this time and is doing well.  No bleeding currently.      Macrocytosis without anemia  CBC shows a continued high MCV but no anemia or cytopenia otherwise.  His Hb is slightly elevated at this time and will continue to watch this.      History of pulmonary embolus (PE)  Breathing has been good and patient remains on Xarelto.  Will continue.  No bleeding noted.      Discussion:     Follow-up in about 6 months (around 9/12/2019).      Electronically signed by Silvestre Albert

## 2019-08-06 ENCOUNTER — HOSPITAL ENCOUNTER (OUTPATIENT)
Dept: RADIOLOGY | Facility: CLINIC | Age: 69
Discharge: HOME | End: 2019-08-06
Attending: PODIATRIST
Payer: COMMERCIAL

## 2019-08-06 ENCOUNTER — OFFICE VISIT (OUTPATIENT)
Dept: PODIATRY | Facility: CLINIC | Age: 69
End: 2019-08-06
Payer: COMMERCIAL

## 2019-08-06 VITALS
HEIGHT: 73 IN | WEIGHT: 286 LBS | DIASTOLIC BLOOD PRESSURE: 78 MMHG | SYSTOLIC BLOOD PRESSURE: 138 MMHG | HEART RATE: 60 BPM | BODY MASS INDEX: 37.91 KG/M2

## 2019-08-06 DIAGNOSIS — M10.9 ACUTE GOUT OF RIGHT FOOT, UNSPECIFIED CAUSE: Primary | ICD-10-CM

## 2019-08-06 DIAGNOSIS — M79.671 RIGHT FOOT PAIN: ICD-10-CM

## 2019-08-06 PROBLEM — M79.672 LEFT FOOT PAIN: Status: ACTIVE | Noted: 2019-08-06

## 2019-08-06 PROCEDURE — 1101F PR PT FALLS ASSESS DOC 0-1 FALLS W/OUT INJ PAST YR: ICD-10-PCS | Mod: S$GLB,,, | Performed by: PODIATRIST

## 2019-08-06 PROCEDURE — 3078F DIAST BP <80 MM HG: CPT | Mod: S$GLB,,, | Performed by: PODIATRIST

## 2019-08-06 PROCEDURE — 3078F PR MOST RECENT DIASTOLIC BLOOD PRESSURE < 80 MM HG: ICD-10-PCS | Mod: S$GLB,,, | Performed by: PODIATRIST

## 2019-08-06 PROCEDURE — 1101F PT FALLS ASSESS-DOCD LE1/YR: CPT | Mod: S$GLB,,, | Performed by: PODIATRIST

## 2019-08-06 PROCEDURE — 73630 XR FOOT COMPLETE 3 VIEW RIGHT: ICD-10-PCS | Mod: RT,S$GLB,, | Performed by: PODIATRIST

## 2019-08-06 PROCEDURE — 3075F PR MOST RECENT SYSTOLIC BLOOD PRESS GE 130-139MM HG: ICD-10-PCS | Mod: S$GLB,,, | Performed by: PODIATRIST

## 2019-08-06 PROCEDURE — 73630 X-RAY EXAM OF FOOT: CPT | Mod: RT,S$GLB,, | Performed by: PODIATRIST

## 2019-08-06 PROCEDURE — 99999 PR PBB SHADOW E&M-EST. PATIENT-LVL IV: ICD-10-PCS | Mod: PBBFAC,,, | Performed by: PODIATRIST

## 2019-08-06 PROCEDURE — 99203 OFFICE O/P NEW LOW 30 MIN: CPT | Mod: S$GLB,,, | Performed by: PODIATRIST

## 2019-08-06 PROCEDURE — 99999 PR PBB SHADOW E&M-EST. PATIENT-LVL IV: CPT | Mod: PBBFAC,,, | Performed by: PODIATRIST

## 2019-08-06 PROCEDURE — 99203 PR OFFICE/OUTPT VISIT, NEW, LEVL III, 30-44 MIN: ICD-10-PCS | Mod: S$GLB,,, | Performed by: PODIATRIST

## 2019-08-06 PROCEDURE — 3075F SYST BP GE 130 - 139MM HG: CPT | Mod: S$GLB,,, | Performed by: PODIATRIST

## 2019-08-06 RX ORDER — IPRATROPIUM BROMIDE 42 UG/1
SPRAY, METERED NASAL
Refills: 1 | COMMUNITY
Start: 2019-07-05 | End: 2020-02-17

## 2019-08-06 RX ORDER — METHYLPREDNISOLONE 4 MG/1
TABLET ORAL
Qty: 1 PACKAGE | Refills: 0 | Status: SHIPPED | OUTPATIENT
Start: 2019-08-06 | End: 2019-08-27

## 2019-08-06 RX ORDER — COLCHICINE 0.6 MG/1
CAPSULE ORAL
Refills: 0 | COMMUNITY
Start: 2019-08-04 | End: 2020-02-17

## 2019-08-06 RX ORDER — METHYLPREDNISOLONE 4 MG/1
TABLET ORAL
Refills: 0 | COMMUNITY
Start: 2019-06-22 | End: 2019-08-06

## 2019-08-06 RX ORDER — DOXYCYCLINE 100 MG/1
CAPSULE ORAL
Refills: 0 | COMMUNITY
Start: 2019-06-22 | End: 2019-08-06

## 2019-08-06 RX ORDER — SIMVASTATIN 20 MG/1
10 TABLET, FILM COATED ORAL NIGHTLY
COMMUNITY
End: 2021-02-17 | Stop reason: SDUPTHER

## 2019-08-06 RX ORDER — CETIRIZINE HYDROCHLORIDE 10 MG/1
TABLET ORAL
Refills: 0 | COMMUNITY
Start: 2019-06-22 | End: 2019-08-06

## 2019-08-06 RX ORDER — TADALAFIL 20 MG/1
20 TABLET ORAL DAILY PRN
COMMUNITY

## 2019-08-06 NOTE — PATIENT INSTRUCTIONS
Gout Diet  Gout is a painful condition caused by an excess of uric acid, a waste product made by the body. Uric acid forms crystals that collect in the joints. The immune response to these crystals brings on symptoms of joint pain and swelling. This is called a gout attack. Often, medications and diet changes are combined to manage gout. Below are some guidelines for changing your diet to help you manage gout and prevent attacks. Your health care provider will help you determine the best eating plan for you.     Eating to manage gout  Weight loss for those who are overweight may help reduce gout attacks.  Eat less of these foods  Eating too many foods containing purines may raise the levels of uric acid in your body. This raises your risk for a gout attack. Try to limit these foods and drinks:  · Alcohol, such as beer and red wine. You may be told to avoid alcohol completely.  · Soft drinks that contain sugar or high fructose corn syrup  · Certain fish, including anchovies, sardines, fish eggs, and herring  · Shellfish  · Certain meats, such as red meat, hot dogs, luncheon meats, and turkey  · Organ meats, such as liver, kidneys, and sweetbreads  · Legumes, such as dried beans and peas  · Other high fat foods such as gravy, whole milk, and high fat cheeses  · Vegetables such as asparagus, cauliflower, spinach, and mushrooms used to be thought to contribute to an increased risk for a gout attack, but recent studies show that high purine vegetables don't increase the risk for a gout attack.  Eat more of these foods  Other foods may be helpful for people with gout. Add some of these foods to your diet:  · Cherries contain chemicals that may lower uric acid.  · Omega fatty acids. These are found in some fatty fish such as salmon, certain oils (flax, olive, or nut), and nuts themselves. Omega fatty acids may help prevent inflammation due to gout.  · Dairy products that are low-fat or fat-free, such as cheese and  yogurt  · Complex carbohydrate foods, including whole grains, brown rice, oats, and beans  · Coffee, in moderation  · Water, approximately 64 ounces per day  Follow-up care  Follow up with your healthcare provider as advised.  When to seek medical advice  Call your healthcare provider right away if any of these occur:  · Return of gout symptoms, usually at night:  · Severe pain, swelling, and heat in a joint, especially the base of the big toe  · Affected joint is hard to move  · Skin of the affected joint is purple or red  · Fever of 100.4°F (38°C) or higher  · Pain that doesn't get better even with prescribed medicine   Date Last Reviewed: 1/12/2016  © 6729-3735 [employer]. [address] [place] [address], [place] [address]. All rights reserved. This information is not intended as a substitute for professional medical care. Always follow your healthcare professional's instructions.        What Is Gout?  Gout is a disease that affects the joints. Left untreated, it can lead to painful foot and joint deformities and even kidney problems. But, by treating gout early, you can relieve pain and help prevent future problems. Gout can usually be treated with medication and proper diet. In severe cases, surgery may be needed.  What causes gout?  Gout is caused by an excess of uric acid (a waste product made by the body). Uric acid is excreted by the kidneys. If the uric acid level in your blood rises too high, the uric acid may form crystals that collect in the joints, bringing on a gout attack. If you have many gout attacks, crystals may form large deposits called tophi. Tophi can damage joints and cause deformity.  Who is at risk for gout?  Men are more likely to have gout than women. But women can also be affected, mostly after menopause. Some health problems, such as obesity and high cholesterol, make gout more likely. And some medications, such as diuretics (water pills), can trigger a gout attack. People who  "drink a lot of alcohol are at high risk for gout. Certain foods can also trigger a gout attack.  Substances that may trigger a gout attack  To help prevent a gout attack, avoid these foods:  · Alcohol (particularly beer, but also red wine and spirits)  · Certain meats (red meat, processed meat, turkey)  · Organ meats (kidney, liver, sweetbread)  · Shellfish (lobster, crab, shrimp, scallop, mussel)  · Certain fish (anchovy, sardine, herring, mackerel)   Treatment  · Lifestyle changes, including weight loss, exercise, and quitting tobacco use  · Reducing consumption of the food groups above as well as high fructose corn syrup, found in many foods including sodas and energy drinks  · Changing non-essential medications that may contribute to gout (such as thiazide diuretics--"water pills")  · Medications to reduce the amount of uric acid in the blood, such as allopurinol or others  · Medications to treat acute gout attacks, including NSAIDs (nonsteroidal anti-inflammatory medicines), steroids, and colchicine  Date Last Reviewed: 2/1/2016  © 1046-8991 [employer], [employer]. [address] [place] [address]. All rights reserved. This information is not intended as a substitute for professional medical care. Always follow your healthcare professional's instructions.        "

## 2019-08-06 NOTE — PROGRESS NOTES
[address] [facility] [address]  [place] [address]  Phone: [phone]   [phone]    Patient's PCP:Shlomo Zavala MD  Referring Provider: No ref. provider found    Subjective:      Chief Complaint:: Foot Pain (red and swollen with pain first toe right)    HPI  Matt Rae is a 69 y.o. male who presents with a complaint of  Right first toe pain lasting for a week.Woke up one morning and my foot didn't feel right.Last Thursday and Friday we did a lot of walking with my grandson. Onset of the symptoms was .  Current symptoms include pain,redness and swelling.  Aggravating factors are touch, shoes and being on feet. Symptoms have gotten worse.Treatment to date have included icing every hour for 20 minutes, went to Urgent care on Sunday and given Mitigare 0.6 which doesn't seem to be helping.Patients rates pain 6/10/ on pain scale.Sitting pain is 2/10        Vitals:    08/06/19 1447   BP: 138/78   Pulse: 60     Shoe Size:     Past Surgical History:   Procedure Laterality Date    ARTHROSCOPY, KNEE Left 12/18/2012    [facility] OR    BACK SURGERY  9/2010    L4-5 fusion decompression    CHONDRECTOMY, KNEE, SEMILUNAR CARTILAGE Left 12/18/2012    Per[facility] Wells [facility] at [facility] OR    CYSTOSCOPY      CYSTOSCOPY, RETROGRADE, URETEROSCOPY, STENT PLACEMENT Right 3/14/2016    [facility]    JOINT REPLACEMENT  2009    Partial Rt shoulder    KIDNEY STONE SURGERY      KNEE ARTHROSCOPY      LITHOTRIPSY      LITHOTRIPSY-LASER Right 3/14/2016    [facility] at [facility]    RADIOFREQUENCY ABLATION  2016    for chronic back pain    removal of colon polyp      Rt shoulder      Partial replacement 2008    URETEROSCOPY - rigid and flexible ureteroscope Right 3/14/2016    [facility] at [facility]     Past Medical History:   Diagnosis Date    Arthritis     Blood clotting tendency      Cancer     skin cancer    Coronary artery disease     MI  Stent placement 3/2005    Heart attack     Hypertension     Joint pain     Keloid cicatrix     Kidney stone     Neoplastic syndrome     Pulmonary embolism     x 2: 2005, 2015    Renal stone     Squamous cell carcinoma 02/22/2016    left dorsal hand     Wears glasses      Family History   Problem Relation Age of Onset    Nephrolithiasis Father     Heart attack Father     Arthritis Mother     Ulcers Mother     Deep vein thrombosis Brother     Pulmonary embolism Brother     Heart attack Brother     Melanoma Neg Hx     Psoriasis Neg Hx     Lupus Neg Hx     Eczema Neg Hx     Acne Neg Hx         Social History:   Marital Status:   Alcohol History:  reports that he drinks alcohol.  Tobacco History:  reports that he has never smoked. He has never used smokeless tobacco.  Drug History:  reports that he does not use drugs.    Review of patient's allergies indicates:   Allergen Reactions    Penicillins Hives       Current Outpatient Medications   Medication Sig Dispense Refill    aspirin (ECOTRIN) 81 MG EC tablet Take 81 mg by mouth once daily.      b complex vitamins tablet Take 1 tablet by mouth once daily.      captopril (CAPOTEN) 12.5 MG tablet Take 12.5 mg by mouth 2 (two) times daily.       furosemide (LASIX) 20 MG tablet 20 mg 3 (three) times a week. Monday, Wednesday, Friday  1    ipratropium (ATROVENT) 42 mcg (0.06 %) nasal spray   1    methocarbamol (ROBAXIN) 750 MG Tab Take 750 mg by mouth as needed.       metoprolol succinate (TOPROL-XL) 25 MG 24 hr tablet Take 25 mg by mouth once daily.       MITIGARE 0.6 mg Cap TK ONE C PO Q 12 H FOR 5 DAYS  0    multivitamin capsule Take 1 capsule by mouth once daily.        potassium chloride (KLOR-CON) 10 MEQ TbSR 10 mEq. Take with Lasix      simvastatin (ZOCOR) 20 MG tablet Take 20 mg by mouth every evening.      tadalafil (CIALIS) 20 MG Tab Take 20 mg by mouth once daily.       tramadol (ULTRAM) 50 mg tablet Take 50 mg by mouth once daily.       XARELTO 15 mg Tab 15 mg once daily.       methylPREDNISolone (MEDROL DOSEPACK) 4 mg tablet use as directed 1 Package 0     No current facility-administered medications for this visit.        Review of Systems      Objective:        Physical Exam:   Foot Exam  Physical Exam   Musculoskeletal:        Feet:        Imaging:   AP, lateral, lateral oblique weight-bearing x-rays right foot:  No acute fractures, no bone tumors, no soft tissue masses. No infection of the bone seen.  Swelling by 1st MPJ consistent with gout         Assessment:       1. Left foot pain    2. Right foot pain    3. Acute gout of right foot, unspecified cause      Plan:   Left foot pain  -     Cancel: X-Ray Foot Complete Left    Right foot pain  -     Cancel: X-Ray Foot Complete Right  -     X-Ray Foot Complete Right    Acute gout of right foot, unspecified cause    Other orders  -     methylPREDNISolone (MEDROL DOSEPACK) 4 mg tablet; use as directed  Dispense: 1 Package; Refill: 0     I am ordering a uric acid test to evaluate with the level of uric acid is.  We discussed gout today.  His kidneys on placing him on a Medrol Dosepak recommend he stop the Colcrys and if not improved in 1 week will call me.  If uric acid level is high will need to see primary care our nephrologist for possible allopurinol.  No follow-ups on file.    Procedures - None    Counseling:   causes of gout, diet for gout and oral medication for gout.  I discussed lowering the uric acid level to below 6 and as close to 5.5 to stop uric acid crystal formation.  I provided patient education verbally regarding:   Patient diagnosis, treatment options, as well as alternatives, risks, and benefits.     This note was created using Dragon voice recognition software that occasionally misinterpreted phrases or words.

## 2019-08-08 LAB — URATE SERPL-MCNC: 7.7 MG/DL (ref 4–8)

## 2019-08-12 ENCOUNTER — TELEPHONE (OUTPATIENT)
Dept: PODIATRY | Facility: CLINIC | Age: 69
End: 2019-08-12

## 2019-08-12 NOTE — TELEPHONE ENCOUNTER
Spoke with patient regarding uric acid results. As per Dr. Pickard, patient should make an appointment with primary care regarding allopurinol prescription.

## 2019-08-12 NOTE — TELEPHONE ENCOUNTER
----- Message from Kam Pickard DPM sent at 8/11/2019  7:30 AM CDT -----  Let patient know his uric acid is still high and he will need to talk with his primary care MD about allopurinol.

## 2019-08-26 ENCOUNTER — TELEPHONE (OUTPATIENT)
Dept: PODIATRY | Facility: CLINIC | Age: 69
End: 2019-08-26

## 2019-08-26 NOTE — TELEPHONE ENCOUNTER
Patient called and said you looked at his right foot re: gout about 3 weeks ago and you Rx him a steroid pack that did calm everything down about 80%.  Dr. Ko Rx: allopurinol - he has been taking it for 13 days and pain is back immensely.  Dr. Ko is not in town until next week - they recommended pt to call us.  They told pt that she will probably need another steroid pack but needs us to do this as Dr. Ko is not in town.  Said if we need him to come in tomorrow please advise ASAP.   Can reach patient at: [phone]        Wasn't sure what you wanted to do, please advise.

## 2019-08-27 ENCOUNTER — OFFICE VISIT (OUTPATIENT)
Dept: PODIATRY | Facility: CLINIC | Age: 69
End: 2019-08-27
Payer: COMMERCIAL

## 2019-08-27 VITALS
SYSTOLIC BLOOD PRESSURE: 138 MMHG | HEART RATE: 60 BPM | WEIGHT: 286 LBS | DIASTOLIC BLOOD PRESSURE: 78 MMHG | HEIGHT: 73 IN | BODY MASS INDEX: 37.91 KG/M2

## 2019-08-27 DIAGNOSIS — M10.9 ACUTE GOUT OF RIGHT FOOT, UNSPECIFIED CAUSE: Primary | ICD-10-CM

## 2019-08-27 DIAGNOSIS — M79.671 RIGHT FOOT PAIN: ICD-10-CM

## 2019-08-27 PROCEDURE — 99213 OFFICE O/P EST LOW 20 MIN: CPT | Mod: 25,S$GLB,, | Performed by: PODIATRIST

## 2019-08-27 PROCEDURE — 3075F PR MOST RECENT SYSTOLIC BLOOD PRESS GE 130-139MM HG: ICD-10-PCS | Mod: S$GLB,,, | Performed by: PODIATRIST

## 2019-08-27 PROCEDURE — 3075F SYST BP GE 130 - 139MM HG: CPT | Mod: S$GLB,,, | Performed by: PODIATRIST

## 2019-08-27 PROCEDURE — 1101F PR PT FALLS ASSESS DOC 0-1 FALLS W/OUT INJ PAST YR: ICD-10-PCS | Mod: S$GLB,,, | Performed by: PODIATRIST

## 2019-08-27 PROCEDURE — 3078F PR MOST RECENT DIASTOLIC BLOOD PRESSURE < 80 MM HG: ICD-10-PCS | Mod: S$GLB,,, | Performed by: PODIATRIST

## 2019-08-27 PROCEDURE — 20600 DRAIN/INJ JOINT/BURSA W/O US: CPT | Mod: RT,S$GLB,, | Performed by: PODIATRIST

## 2019-08-27 PROCEDURE — 99999 PR PBB SHADOW E&M-EST. PATIENT-LVL III: CPT | Mod: PBBFAC,,, | Performed by: PODIATRIST

## 2019-08-27 PROCEDURE — 99213 PR OFFICE/OUTPT VISIT, EST, LEVL III, 20-29 MIN: ICD-10-PCS | Mod: 25,S$GLB,, | Performed by: PODIATRIST

## 2019-08-27 PROCEDURE — 1101F PT FALLS ASSESS-DOCD LE1/YR: CPT | Mod: S$GLB,,, | Performed by: PODIATRIST

## 2019-08-27 PROCEDURE — 20600 PR DRAIN/INJECT SMALL JOINT/BURSA: ICD-10-PCS | Mod: RT,S$GLB,, | Performed by: PODIATRIST

## 2019-08-27 PROCEDURE — 99999 PR PBB SHADOW E&M-EST. PATIENT-LVL III: ICD-10-PCS | Mod: PBBFAC,,, | Performed by: PODIATRIST

## 2019-08-27 PROCEDURE — 3078F DIAST BP <80 MM HG: CPT | Mod: S$GLB,,, | Performed by: PODIATRIST

## 2019-08-27 RX ORDER — METHYLPREDNISOLONE ACETATE 40 MG/ML
20 INJECTION, SUSPENSION INTRA-ARTICULAR; INTRALESIONAL; INTRAMUSCULAR; SOFT TISSUE
Status: COMPLETED | OUTPATIENT
Start: 2019-08-27 | End: 2019-08-27

## 2019-08-27 RX ORDER — DEXAMETHASONE SODIUM PHOSPHATE 4 MG/ML
4 INJECTION, SOLUTION INTRA-ARTICULAR; INTRALESIONAL; INTRAMUSCULAR; INTRAVENOUS; SOFT TISSUE
Status: COMPLETED | OUTPATIENT
Start: 2019-08-27 | End: 2019-08-27

## 2019-08-27 RX ORDER — DICLOFENAC SODIUM 10 MG/G
2 GEL TOPICAL 4 TIMES DAILY
Qty: 100 G | Refills: 3 | Status: ON HOLD | OUTPATIENT
Start: 2019-08-27 | End: 2020-02-20

## 2019-08-27 RX ORDER — ALLOPURINOL 100 MG/1
TABLET ORAL DAILY
Refills: 2 | COMMUNITY
Start: 2019-08-14 | End: 2021-02-17 | Stop reason: DRUGHIGH

## 2019-08-27 RX ORDER — BUPIVACAINE HYDROCHLORIDE 5 MG/ML
1.5 INJECTION, SOLUTION PERINEURAL
Status: COMPLETED | OUTPATIENT
Start: 2019-08-27 | End: 2019-08-27

## 2019-08-27 RX ORDER — METHYLPREDNISOLONE 4 MG/1
TABLET ORAL
Qty: 1 PACKAGE | Refills: 1 | Status: SHIPPED | OUTPATIENT
Start: 2019-08-27 | End: 2020-02-17

## 2019-08-27 RX ADMIN — METHYLPREDNISOLONE ACETATE 20 MG: 40 INJECTION, SUSPENSION INTRA-ARTICULAR; INTRALESIONAL; INTRAMUSCULAR; SOFT TISSUE at 11:08

## 2019-08-27 RX ADMIN — DEXAMETHASONE SODIUM PHOSPHATE 4 MG: 4 INJECTION, SOLUTION INTRA-ARTICULAR; INTRALESIONAL; INTRAMUSCULAR; INTRAVENOUS; SOFT TISSUE at 11:08

## 2019-08-27 RX ADMIN — BUPIVACAINE HYDROCHLORIDE 7.5 MG: 5 INJECTION, SOLUTION PERINEURAL at 11:08

## 2019-08-27 NOTE — PATIENT INSTRUCTIONS
Gout Diet  Gout is a painful condition caused by an excess of uric acid, a waste product made by the body. Uric acid forms crystals that collect in the joints. The immune response to these crystals brings on symptoms of joint pain and swelling. This is called a gout attack. Often, medications and diet changes are combined to manage gout. Below are some guidelines for changing your diet to help you manage gout and prevent attacks. Your health care provider will help you determine the best eating plan for you.     Eating to manage gout  Weight loss for those who are overweight may help reduce gout attacks.  Eat less of these foods  Eating too many foods containing purines may raise the levels of uric acid in your body. This raises your risk for a gout attack. Try to limit these foods and drinks:  · Alcohol, such as beer and red wine. You may be told to avoid alcohol completely.  · Soft drinks that contain sugar or high fructose corn syrup  · Certain fish, including anchovies, sardines, fish eggs, and herring  · Shellfish  · Certain meats, such as red meat, hot dogs, luncheon meats, and turkey  · Organ meats, such as liver, kidneys, and sweetbreads  · Legumes, such as dried beans and peas  · Other high fat foods such as gravy, whole milk, and high fat cheeses  · Vegetables such as asparagus, cauliflower, spinach, and mushrooms used to be thought to contribute to an increased risk for a gout attack, but recent studies show that high purine vegetables don't increase the risk for a gout attack.  Eat more of these foods  Other foods may be helpful for people with gout. Add some of these foods to your diet:  · Cherries contain chemicals that may lower uric acid.  · Omega fatty acids. These are found in some fatty fish such as salmon, certain oils (flax, olive, or nut), and nuts themselves. Omega fatty acids may help prevent inflammation due to gout.  · Dairy products that are low-fat or fat-free, such as cheese and  yogurt  · Complex carbohydrate foods, including whole grains, brown rice, oats, and beans  · Coffee, in moderation  · Water, approximately 64 ounces per day  Follow-up care  Follow up with your healthcare provider as advised.  When to seek medical advice  Call your healthcare provider right away if any of these occur:  · Return of gout symptoms, usually at night:  · Severe pain, swelling, and heat in a joint, especially the base of the big toe  · Affected joint is hard to move  · Skin of the affected joint is purple or red  · Fever of 100.4°F (38°C) or higher  · Pain that doesn't get better even with prescribed medicine   Date Last Reviewed: 1/12/2016  © 4980-5565 [employer]. [address] [place] [address], [place] [address]. All rights reserved. This information is not intended as a substitute for professional medical care. Always follow your healthcare professional's instructions.        What Is Gout?  Gout is a disease that affects the joints. Left untreated, it can lead to painful foot and joint deformities and even kidney problems. But, by treating gout early, you can relieve pain and help prevent future problems. Gout can usually be treated with medication and proper diet. In severe cases, surgery may be needed.  What causes gout?  Gout is caused by an excess of uric acid (a waste product made by the body). Uric acid is excreted by the kidneys. If the uric acid level in your blood rises too high, the uric acid may form crystals that collect in the joints, bringing on a gout attack. If you have many gout attacks, crystals may form large deposits called tophi. Tophi can damage joints and cause deformity.  Who is at risk for gout?  Men are more likely to have gout than women. But women can also be affected, mostly after menopause. Some health problems, such as obesity and high cholesterol, make gout more likely. And some medications, such as diuretics (water pills), can trigger a gout attack. People who  "drink a lot of alcohol are at high risk for gout. Certain foods can also trigger a gout attack.  Substances that may trigger a gout attack  To help prevent a gout attack, avoid these foods:  · Alcohol (particularly beer, but also red wine and spirits)  · Certain meats (red meat, processed meat, turkey)  · Organ meats (kidney, liver, sweetbread)  · Shellfish (lobster, crab, shrimp, scallop, mussel)  · Certain fish (anchovy, sardine, herring, mackerel)   Treatment  · Lifestyle changes, including weight loss, exercise, and quitting tobacco use  · Reducing consumption of the food groups above as well as high fructose corn syrup, found in many foods including sodas and energy drinks  · Changing non-essential medications that may contribute to gout (such as thiazide diuretics--"water pills")  · Medications to reduce the amount of uric acid in the blood, such as allopurinol or others  · Medications to treat acute gout attacks, including NSAIDs (nonsteroidal anti-inflammatory medicines), steroids, and colchicine  Date Last Reviewed: 2/1/2016  © 7265-9785 [employer]. [address] [place] [address]. All rights reserved. This information is not intended as a substitute for professional medical care. Always follow your healthcare professional's instructions.        "

## 2019-08-27 NOTE — PROGRESS NOTES
[address] [facility] [address]  [place] [address]  Phone: [phone]   [phone]    Patient's PCP:Shlomo Zavala MD  Referring Provider: No ref. provider found    Subjective:      Chief Complaint:: Gout (right)    HPI  Matt Rae is a 69 y.o. male who presents to clinic to follow-up for gout in right foot.  We Rx him a steroid pack on 08/06/19 that did calm everything down about 80%. But his kidney doctor Rx him allopurinol and has been taking it for 14 days now and pain is back immensely.  Pt says the only shoes he can wear is flip flops which has now begun to hurt.  Patient is trying to follow the gout diet was given which goes against his heart diet.  Also has tried drinking apple cider vinegar, biofreeze spray. Only thing that has really helped he pain is a Tramadol which was Rx for his back.  Says if anything really touches the 1st MPJ pain is immense.  Pain is a 7/10 on a pain scale when walking.    Patient was Rx a voltaren gel for his back sx, wants to know if he could use this on his foot.    Vitals:    08/27/19 0903   BP: 138/78   Pulse: 60     Shoe Size: 12    Past Surgical History:   Procedure Laterality Date    ARTHROSCOPY, KNEE Left 12/18/2012    Performed by Yohan Wells MD at [facility] OR    BACK SURGERY  9/2010    L4-5 fusion decompression    CHONDRECTOMY, KNEE, SEMILUNAR CARTILAGE Left 12/18/2012    Performed by Yohan Wells MD at [facility] OR    CYSTOSCOPY      CYSTOSCOPY, RETROGRADE, URETEROSCOPY, STENT PLACEMENT Right 3/14/2016    Performed by [facility]ie [facility]urto [facility] at [facility] OR    JOINT REPLACEMENT  2009    Partial Rt shoulder    KIDNEY STONE SURGERY      KNEE ARTHROSCOPY      LITHOTRIPSY      LITHOTRIPSY-LASER Right 3/14/2016    Performed by [facility]ie [facility]urto MD at [facility] OR    RADIOFREQUENCY ABLATION  2016    for chronic back pain    removal of colon polyp      Rt shoulder      Partial replacement 2008    URETEROSCOPY - rigid and flexible ureteroscope  Right 3/14/2016    Per[facility] by [facility] at [facility] OR     Past Medical History:   Diagnosis Date    Arthritis     Blood clotting tendency     Cancer     skin cancer    Coronary artery disease     MI  Stent placement 3/2005    Heart attack     Hypertension     Joint pain     Keloid cicatrix     Kidney stone     Neoplastic syndrome     Pulmonary embolism     x 2: 2005, 2015    Renal stone     Squamous cell carcinoma 02/22/2016    left dorsal hand     Wears glasses      Family History   Problem Relation Age of Onset    Nephrolithiasis Father     Heart attack Father     Arthritis Mother     Ulcers Mother     Deep vein thrombosis Brother     Pulmonary embolism Brother     Heart attack Brother     Melanoma Neg Hx     Psoriasis Neg Hx     Lupus Neg Hx     Eczema Neg Hx     Acne Neg Hx         Social History:   Marital Status:   Alcohol History:  reports that he drinks alcohol.  Tobacco History:  reports that he has never smoked. He has never used smokeless tobacco.  Drug History:  reports that he does not use drugs.    Review of patient's allergies indicates:   Allergen Reactions    Penicillins Hives       Current Outpatient Medications   Medication Sig Dispense Refill    allopurinol (ZYLOPRIM) 100 MG tablet   2    aspirin (ECOTRIN) 81 MG EC tablet Take 81 mg by mouth once daily.      b complex vitamins tablet Take 1 tablet by mouth once daily.      captopril (CAPOTEN) 12.5 MG tablet Take 12.5 mg by mouth 2 (two) times daily.       furosemide (LASIX) 20 MG tablet 20 mg 3 (three) times a week. Monday, Wednesday, Friday  1    ipratropium (ATROVENT) 42 mcg (0.06 %) nasal spray   1    methocarbamol (ROBAXIN) 750 MG Tab Take 750 mg by mouth as needed.       metoprolol succinate (TOPROL-XL) 25 MG 24 hr tablet Take 25 mg by mouth once daily.       MITIGARE 0.6 mg Cap TK ONE C PO Q 12 H FOR 5 DAYS  0    multivitamin capsule Take 1 capsule by mouth once daily.         potassium chloride (KLOR-CON) 10 MEQ TbSR 10 mEq. Take with Lasix      simvastatin (ZOCOR) 20 MG tablet Take 20 mg by mouth every evening.      tadalafil (CIALIS) 20 MG Tab Take 20 mg by mouth once daily.      tramadol (ULTRAM) 50 mg tablet Take 50 mg by mouth once daily.       XARELTO 15 mg Tab 15 mg once daily.        No current facility-administered medications for this visit.        Review of Systems      Objective:            Physical Exam:   Foot Exam    General  General Appearance: appears stated age and healthy   Orientation: alert and oriented to person, place, and time   Affect: appropriate   Gait: antalgic       Right Foot/Ankle     Inspection and Palpation  Ecchymosis: none  Tenderness: great toe metatarsophalangeal joint (Redness present dorsal medial 1st MPJ)  Swelling: great toe metatarsophalangeal joint (Redness and swelling dorsal medial 1st MPJ consistent with gout)  Skin Exam: erythema (Dorsal medial 1st MPJ);     Neurovascular  Dorsalis pedis: 2+  Posterior tibial: 2+          Physical Exam   Cardiovascular:   Pulses:       Dorsalis pedis pulses are 2+ on the right side.        Posterior tibial pulses are 2+ on the right side.   Feet:   Right Foot:   Skin Integrity: Positive for erythema (Dorsal medial 1st MPJ).       Imaging:            Assessment:       1. Acute gout of right foot, unspecified cause    2. Right foot pain      Plan:   Acute gout of right foot, unspecified cause    Right foot pain    A steroid injection was performed at 1st MPJ of the right foot with 0.5% plain Marcaine 0.5 cc, 4 mg of Decadron phosphate, 20 mg of methylprednisolone. This was well tolerated.    Patient will continue the allopurinol per his MD.  He use review the the dosage with his MD after the repeat uric acid and kidney test.  Will probably need to be increased to 200 mg daily.  I explained to him that you can occasionally get a gout attack when he start allopurinol especially at the low level.  I explained  though that we use a have to titrated the levels a gated to the appropriate level of medication.  The goal is to get the uric acid below 5.5.  He will also apply Voltaren gel to the affected area.  I am also giving him a prescription for Medrol Dosepak in case shot does not give him enough relief.  Follow up for Gout right.    Procedures - None    Counseling:     I provided patient education verbally regarding:   Patient diagnosis, treatment options, as well as alternatives, risks, and benefits.     This note was created using Dragon voice recognition software that occasionally misinterpreted phrases or words.

## 2019-09-10 ENCOUNTER — TELEPHONE (OUTPATIENT)
Dept: HEMATOLOGY/ONCOLOGY | Facility: CLINIC | Age: 69
End: 2019-09-10

## 2019-10-07 ENCOUNTER — LAB VISIT (OUTPATIENT)
Dept: LAB | Facility: HOSPITAL | Age: 69
End: 2019-10-07
Attending: INTERNAL MEDICINE
Payer: COMMERCIAL

## 2019-10-07 DIAGNOSIS — Z86.711 PERSONAL HISTORY OF PE (PULMONARY EMBOLISM): Primary | ICD-10-CM

## 2019-10-07 LAB
ALBUMIN SERPL BCP-MCNC: 3.8 G/DL (ref 3.5–5.2)
ALP SERPL-CCNC: 50 U/L (ref 55–135)
ALT SERPL W/O P-5'-P-CCNC: 21 U/L (ref 10–44)
ANION GAP SERPL CALC-SCNC: 8 MMOL/L (ref 8–16)
AST SERPL-CCNC: 22 U/L (ref 10–40)
BASOPHILS # BLD AUTO: 0.04 K/UL (ref 0–0.2)
BASOPHILS NFR BLD: 0.7 % (ref 0–1.9)
BILIRUB SERPL-MCNC: 0.7 MG/DL (ref 0.1–1)
BUN SERPL-MCNC: 21 MG/DL (ref 8–23)
CALCIUM SERPL-MCNC: 9.1 MG/DL (ref 8.7–10.5)
CHLORIDE SERPL-SCNC: 105 MMOL/L (ref 95–110)
CO2 SERPL-SCNC: 27 MMOL/L (ref 23–29)
CREAT SERPL-MCNC: 1 MG/DL (ref 0.5–1.4)
DIFFERENTIAL METHOD: ABNORMAL
EOSINOPHIL # BLD AUTO: 0.1 K/UL (ref 0–0.5)
EOSINOPHIL NFR BLD: 1.6 % (ref 0–8)
ERYTHROCYTE [DISTWIDTH] IN BLOOD BY AUTOMATED COUNT: 13.2 % (ref 11.5–14.5)
EST. GFR  (AFRICAN AMERICAN): >60 ML/MIN/1.73 M^2
EST. GFR  (NON AFRICAN AMERICAN): >60 ML/MIN/1.73 M^2
GLUCOSE SERPL-MCNC: 110 MG/DL (ref 70–110)
HCT VFR BLD AUTO: 44.5 % (ref 40–54)
HGB BLD-MCNC: 14.7 G/DL (ref 14–18)
IMM GRANULOCYTES # BLD AUTO: 0.01 K/UL (ref 0–0.04)
IMM GRANULOCYTES NFR BLD AUTO: 0.2 % (ref 0–0.5)
LYMPHOCYTES # BLD AUTO: 1.7 K/UL (ref 1–4.8)
LYMPHOCYTES NFR BLD: 29 % (ref 18–48)
MCH RBC QN AUTO: 34.2 PG (ref 27–31)
MCHC RBC AUTO-ENTMCNC: 33 G/DL (ref 32–36)
MCV RBC AUTO: 104 FL (ref 82–98)
MONOCYTES # BLD AUTO: 0.6 K/UL (ref 0.3–1)
MONOCYTES NFR BLD: 10.6 % (ref 4–15)
NEUTROPHILS # BLD AUTO: 3.3 K/UL (ref 1.8–7.7)
NEUTROPHILS NFR BLD: 57.9 % (ref 38–73)
NRBC BLD-RTO: 0 /100 WBC
PLATELET # BLD AUTO: 158 K/UL (ref 150–350)
PMV BLD AUTO: 11.3 FL (ref 9.2–12.9)
POTASSIUM SERPL-SCNC: 4.2 MMOL/L (ref 3.5–5.1)
PROT SERPL-MCNC: 7 G/DL (ref 6–8.4)
RBC # BLD AUTO: 4.3 M/UL (ref 4.6–6.2)
SODIUM SERPL-SCNC: 140 MMOL/L (ref 136–145)
WBC # BLD AUTO: 5.68 K/UL (ref 3.9–12.7)

## 2019-10-07 PROCEDURE — 36415 COLL VENOUS BLD VENIPUNCTURE: CPT

## 2019-10-07 PROCEDURE — 85025 COMPLETE CBC W/AUTO DIFF WBC: CPT

## 2019-10-07 PROCEDURE — 80053 COMPREHEN METABOLIC PANEL: CPT

## 2019-10-08 ENCOUNTER — OFFICE VISIT (OUTPATIENT)
Dept: HEMATOLOGY/ONCOLOGY | Facility: CLINIC | Age: 69
End: 2019-10-08
Payer: COMMERCIAL

## 2019-10-08 VITALS
SYSTOLIC BLOOD PRESSURE: 134 MMHG | DIASTOLIC BLOOD PRESSURE: 84 MMHG | WEIGHT: 274.63 LBS | TEMPERATURE: 98 F | RESPIRATION RATE: 18 BRPM | BODY MASS INDEX: 36.23 KG/M2 | HEART RATE: 62 BPM

## 2019-10-08 DIAGNOSIS — Z86.711 HISTORY OF PULMONARY EMBOLUS (PE): Chronic | ICD-10-CM

## 2019-10-08 DIAGNOSIS — D68.61 ANTICARDIOLIPIN SYNDROME: Chronic | ICD-10-CM

## 2019-10-08 DIAGNOSIS — Z23 NEED FOR PROPHYLACTIC VACCINATION AND INOCULATION AGAINST INFLUENZA: Primary | ICD-10-CM

## 2019-10-08 DIAGNOSIS — D75.89 MACROCYTOSIS WITHOUT ANEMIA: Chronic | ICD-10-CM

## 2019-10-08 PROCEDURE — 1101F PR PT FALLS ASSESS DOC 0-1 FALLS W/OUT INJ PAST YR: ICD-10-PCS | Mod: S$GLB,,, | Performed by: INTERNAL MEDICINE

## 2019-10-08 PROCEDURE — 90471 FLU VACCINE - HIGH DOSE (65+) PRESERVATIVE FREE IM: ICD-10-PCS | Mod: S$GLB,,, | Performed by: INTERNAL MEDICINE

## 2019-10-08 PROCEDURE — 3075F PR MOST RECENT SYSTOLIC BLOOD PRESS GE 130-139MM HG: ICD-10-PCS | Mod: S$GLB,,, | Performed by: INTERNAL MEDICINE

## 2019-10-08 PROCEDURE — 90662 FLU VACCINE - HIGH DOSE (65+) PRESERVATIVE FREE IM: ICD-10-PCS | Mod: S$GLB,,, | Performed by: INTERNAL MEDICINE

## 2019-10-08 PROCEDURE — 3079F PR MOST RECENT DIASTOLIC BLOOD PRESSURE 80-89 MM HG: ICD-10-PCS | Mod: S$GLB,,, | Performed by: INTERNAL MEDICINE

## 2019-10-08 PROCEDURE — 99214 PR OFFICE/OUTPT VISIT, EST, LEVL IV, 30-39 MIN: ICD-10-PCS | Mod: 25,S$GLB,, | Performed by: INTERNAL MEDICINE

## 2019-10-08 PROCEDURE — 99214 OFFICE O/P EST MOD 30 MIN: CPT | Mod: 25,S$GLB,, | Performed by: INTERNAL MEDICINE

## 2019-10-08 PROCEDURE — 3079F DIAST BP 80-89 MM HG: CPT | Mod: S$GLB,,, | Performed by: INTERNAL MEDICINE

## 2019-10-08 PROCEDURE — 90662 IIV NO PRSV INCREASED AG IM: CPT | Mod: S$GLB,,, | Performed by: INTERNAL MEDICINE

## 2019-10-08 PROCEDURE — 90471 IMMUNIZATION ADMIN: CPT | Mod: S$GLB,,, | Performed by: INTERNAL MEDICINE

## 2019-10-08 PROCEDURE — 1101F PT FALLS ASSESS-DOCD LE1/YR: CPT | Mod: S$GLB,,, | Performed by: INTERNAL MEDICINE

## 2019-10-08 PROCEDURE — 3075F SYST BP GE 130 - 139MM HG: CPT | Mod: S$GLB,,, | Performed by: INTERNAL MEDICINE

## 2019-10-08 NOTE — ASSESSMENT & PLAN NOTE
Patient is doing well and he remains on Xarelto 15mg daily.  Given this syndrome, I feel he needs continued anticoagulation protection and discussed this with im today.  Will continue to see patient on a six month basis.

## 2019-10-08 NOTE — PROGRESS NOTES
PROGRESS NOTE    Subjective:       Patient ID: Matt Rae is a 69 y.o. male.    Chief Complaint:  Follow-up and Results  Hypercoag state,(ACL) prior PE.      History of Present Illness:   Matt Rae is a 69 y.o. male who presents with a history of hypercoag state and macrocytosis.  Patient is doing well without new complaints at this time.     Continues on Xarelto?    Labs 10/7/2019:    Hb  14.7g/dl    Family and Social history reviewed and is unchanged from 12/9/2014.      ROS:  Review of Systems   Constitutional: Negative for fever and unexpected weight change.   HENT: Negative for nosebleeds.    Respiratory: Negative for chest tightness and shortness of breath.    Cardiovascular: Negative for chest pain.   Gastrointestinal: Negative for abdominal pain and blood in stool.   Genitourinary: Negative for hematuria.   Skin: Negative for rash.   Hematological: Does not bruise/bleed easily.          Current Outpatient Medications:     allopurinol (ZYLOPRIM) 100 MG tablet, , Disp: , Rfl: 2    aspirin (ECOTRIN) 81 MG EC tablet, Take 81 mg by mouth once daily., Disp: , Rfl:     b complex vitamins tablet, Take 1 tablet by mouth once daily., Disp: , Rfl:     captopril (CAPOTEN) 12.5 MG tablet, Take 12.5 mg by mouth 2 (two) times daily. , Disp: , Rfl:     diclofenac sodium (VOLTAREN) 1 % Gel, Apply 2 g topically 4 (four) times daily., Disp: 100 g, Rfl: 3    ipratropium (ATROVENT) 42 mcg (0.06 %) nasal spray, , Disp: , Rfl: 1    methocarbamol (ROBAXIN) 750 MG Tab, Take 750 mg by mouth as needed. , Disp: , Rfl:     methylPREDNISolone (MEDROL DOSEPACK) 4 mg tablet, use as directed, Disp: 1 Package, Rfl: 1    metoprolol succinate (TOPROL-XL) 25 MG 24 hr tablet, Take 25 mg by mouth once daily. , Disp: , Rfl:     MITIGARE 0.6 mg Cap, TK ONE C PO Q 12 H FOR 5 DAYS, Disp: , Rfl: 0    multivitamin capsule, Take 1 capsule by mouth once daily.  , Disp: ,  Rfl:     simvastatin (ZOCOR) 20 MG tablet, Take 20 mg by mouth every evening., Disp: , Rfl:     tadalafil (CIALIS) 20 MG Tab, Take 20 mg by mouth once daily., Disp: , Rfl:     tramadol (ULTRAM) 50 mg tablet, Take 50 mg by mouth once daily. , Disp: , Rfl:     XARELTO 15 mg Tab, 15 mg once daily. , Disp: , Rfl:     furosemide (LASIX) 20 MG tablet, 20 mg 3 (three) times a week. Monday, Wednesday, Friday, Disp: , Rfl: 1    potassium chloride (KLOR-CON) 10 MEQ TbSR, 10 mEq. Take with Lasix, Disp: , Rfl:         Objective:       Physical Examination:     /84   Pulse 62   Temp 97.8 °F (36.6 °C)   Resp 18   Wt 124.6 kg (274 lb 9.6 oz)   BMI 36.23 kg/m²     Physical Exam   Constitutional: He is oriented to person, place, and time. He appears well-developed and well-nourished.   HENT:   Head: Normocephalic and atraumatic.   Right Ear: External ear normal.   Left Ear: External ear normal.   Mouth/Throat: Oropharynx is clear and moist.   Eyes: Pupils are equal, round, and reactive to light. Conjunctivae are normal. No scleral icterus.   Neck: Normal range of motion. Neck supple.   Cardiovascular: Normal rate, regular rhythm and normal heart sounds. Exam reveals no gallop and no friction rub.   No murmur heard.  Pulmonary/Chest: Effort normal and breath sounds normal. No respiratory distress. He has no rales. He exhibits no tenderness.   Abdominal: Soft. Bowel sounds are normal. He exhibits no distension and no mass. There is no hepatosplenomegaly. There is no tenderness. There is no rebound and no guarding.   Musculoskeletal: He exhibits no edema.   Lymphadenopathy:        Head (right side): No tonsillar adenopathy present.        Head (left side): No tonsillar adenopathy present.     He has no cervical adenopathy.     He has no axillary adenopathy.        Right: No supraclavicular adenopathy present.        Left: No supraclavicular adenopathy present.   Neurological: He is alert and oriented to person, place,  and time.   Psychiatric: He has a normal mood and affect. His behavior is normal. Judgment and thought content normal.   Vitals reviewed.      Labs:   Recent Results (from the past 336 hour(s))   CBC auto differential    Collection Time: 10/07/19  7:07 AM   Result Value Ref Range    WBC 5.68 3.90 - 12.70 K/uL    Hemoglobin 14.7 14.0 - 18.0 g/dL    Hematocrit 44.5 40.0 - 54.0 %    Platelets 158 150 - 350 K/uL     CMP  Sodium   Date Value Ref Range Status   10/07/2019 140 136 - 145 mmol/L Final   03/07/2019 139 134 - 144 mmol/L      Potassium   Date Value Ref Range Status   10/07/2019 4.2 3.5 - 5.1 mmol/L Final     Chloride   Date Value Ref Range Status   10/07/2019 105 95 - 110 mmol/L Final   03/07/2019 106 98 - 110 mmol/L      CO2   Date Value Ref Range Status   10/07/2019 27 23 - 29 mmol/L Final     Glucose   Date Value Ref Range Status   10/07/2019 110 70 - 110 mg/dL Final   03/07/2019 125 (H) 70 - 99 mg/dL      BUN, Bld   Date Value Ref Range Status   10/07/2019 21 8 - 23 mg/dL Final     Creatinine   Date Value Ref Range Status   10/07/2019 1.0 0.5 - 1.4 mg/dL Final   03/07/2019 0.99 0.60 - 1.40 mg/dL    01/27/2013 1.0 0.5 - 1.4 mg/dL Final     Calcium   Date Value Ref Range Status   10/07/2019 9.1 8.7 - 10.5 mg/dL Final   01/27/2013 9.5 8.7 - 10.5 mg/dL Final     Total Protein   Date Value Ref Range Status   10/07/2019 7.0 6.0 - 8.4 g/dL Final     Albumin   Date Value Ref Range Status   10/07/2019 3.8 3.5 - 5.2 g/dL Final   03/07/2019 3.6 3.1 - 4.7 g/dL      Total Bilirubin   Date Value Ref Range Status   10/07/2019 0.7 0.1 - 1.0 mg/dL Final     Comment:     For infants and newborns, interpretation of results should be based  on gestational age, weight and in agreement with clinical  observations.  Premature Infant recommended reference ranges:  Up to 24 hours.............<8.0 mg/dL  Up to 48 hours............<12.0 mg/dL  3-5 days..................<15.0 mg/dL  6-29 days.................<15.0 mg/dL       Alkaline  Phosphatase   Date Value Ref Range Status   10/07/2019 50 (L) 55 - 135 U/L Final   01/27/2013 63 55 - 135 U/L Final     AST ([place])   Date Value Ref Range Status   03/09/2016 29 17 - 59 U/L Final     AST   Date Value Ref Range Status   10/07/2019 22 10 - 40 U/L Final     ALT   Date Value Ref Range Status   10/07/2019 21 10 - 44 U/L Final     Anion Gap   Date Value Ref Range Status   10/07/2019 8 8 - 16 mmol/L Final   01/27/2013 13 5 - 15 meq/L Final     eGFR if    Date Value Ref Range Status   10/07/2019 >60.0 >60 mL/min/1.73 m^2 Final     eGFR if non    Date Value Ref Range Status   10/07/2019 >60.0 >60 mL/min/1.73 m^2 Final     Comment:     Calculation used to obtain the estimated glomerular filtration  rate (eGFR) is the CKD-EPI equation.        No results found for: CEA  Lab Results   Component Value Date    PSA 1.0 01/26/2016    PSA 0.57 05/14/2010    PSA 0.8 06/20/2008           Assessment/Plan:   Anticardiolipin syndrome  Patient is doing well and he remains on Xarelto 15mg daily.  Given this syndrome, I feel he needs continued anticoagulation protection and discussed this with im today.  Will continue to see patient on a six month basis.      History of pulmonary embolus (PE)  Patient is doing well.  He does use a CPAP machine which really helps him.  No new sob or cp or symptoms of clot.     Macrocytosis without anemia  This is unchanged and he has no cytopenias noted.  Will continue to monitor this.      Discussion:     Follow up in about 6 months (around 4/8/2020).      Electronically signed by Silvestre Albert

## 2019-10-08 NOTE — ASSESSMENT & PLAN NOTE
Patient is doing well.  He does use a CPAP machine which really helps him.  No new sob or cp or symptoms of clot.

## 2020-02-17 ENCOUNTER — HOSPITAL ENCOUNTER (OUTPATIENT)
Dept: PREADMISSION TESTING | Facility: HOSPITAL | Age: 70
Discharge: HOME | End: 2020-02-17
Attending: INTERNAL MEDICINE
Payer: COMMERCIAL

## 2020-02-17 VITALS — WEIGHT: 270 LBS | HEIGHT: 73 IN | BODY MASS INDEX: 35.78 KG/M2

## 2020-02-17 DIAGNOSIS — Z01.810 PREOP CARDIOVASCULAR EXAM: Primary | ICD-10-CM

## 2020-02-17 LAB
ANION GAP SERPL CALC-SCNC: 8 MMOL/L (ref 8–16)
APTT PPP: 41 SEC (ref 23.6–33.3)
BASOPHILS # BLD AUTO: 0.03 K/UL (ref 0–0.2)
BASOPHILS NFR BLD: 0.5 % (ref 0–1.9)
BILIRUB UR QL STRIP: NEGATIVE
BUN SERPL-MCNC: 16 MG/DL (ref 8–23)
CALCIUM SERPL-MCNC: 9.1 MG/DL (ref 8.7–10.5)
CHLORIDE SERPL-SCNC: 103 MMOL/L (ref 95–110)
CHOLEST SERPL-MCNC: 127 MG/DL (ref 120–199)
CHOLEST/HDLC SERPL: 3.3 {RATIO} (ref 2–5)
CLARITY UR: CLEAR
CO2 SERPL-SCNC: 25 MMOL/L (ref 23–29)
COLOR UR: YELLOW
CREAT SERPL-MCNC: 0.9 MG/DL (ref 0.5–1.4)
DIFFERENTIAL METHOD: ABNORMAL
EOSINOPHIL # BLD AUTO: 0.1 K/UL (ref 0–0.5)
EOSINOPHIL NFR BLD: 1.2 % (ref 0–8)
ERYTHROCYTE [DISTWIDTH] IN BLOOD BY AUTOMATED COUNT: 13.3 % (ref 11.5–14.5)
EST. GFR  (AFRICAN AMERICAN): >60 ML/MIN/1.73 M^2
EST. GFR  (NON AFRICAN AMERICAN): >60 ML/MIN/1.73 M^2
GLUCOSE SERPL-MCNC: 96 MG/DL (ref 70–110)
GLUCOSE UR QL STRIP: NEGATIVE
HCT VFR BLD AUTO: 45.3 % (ref 40–54)
HDLC SERPL-MCNC: 39 MG/DL (ref 40–75)
HDLC SERPL: 30.7 % (ref 20–50)
HGB BLD-MCNC: 15.3 G/DL (ref 14–18)
HGB UR QL STRIP: NEGATIVE
IMM GRANULOCYTES # BLD AUTO: 0.02 K/UL (ref 0–0.04)
IMM GRANULOCYTES NFR BLD AUTO: 0.3 % (ref 0–0.5)
INR PPP: 1.6
KETONES UR QL STRIP: NEGATIVE
LDLC SERPL CALC-MCNC: 53 MG/DL (ref 63–159)
LEUKOCYTE ESTERASE UR QL STRIP: NEGATIVE
LYMPHOCYTES # BLD AUTO: 1.9 K/UL (ref 1–4.8)
LYMPHOCYTES NFR BLD: 31.8 % (ref 18–48)
MAGNESIUM SERPL-MCNC: 2 MG/DL (ref 1.6–2.6)
MCH RBC QN AUTO: 34.1 PG (ref 27–31)
MCHC RBC AUTO-ENTMCNC: 33.8 G/DL (ref 32–36)
MCV RBC AUTO: 101 FL (ref 82–98)
MONOCYTES # BLD AUTO: 0.5 K/UL (ref 0.3–1)
MONOCYTES NFR BLD: 8.4 % (ref 4–15)
NEUTROPHILS # BLD AUTO: 3.5 K/UL (ref 1.8–7.7)
NEUTROPHILS NFR BLD: 57.8 % (ref 38–73)
NITRITE UR QL STRIP: NEGATIVE
NONHDLC SERPL-MCNC: 88 MG/DL
NRBC BLD-RTO: 0 /100 WBC
PH UR STRIP: 6 [PH] (ref 5–8)
PLATELET # BLD AUTO: 153 K/UL (ref 150–350)
PMV BLD AUTO: 10.6 FL (ref 9.2–12.9)
POTASSIUM SERPL-SCNC: 4.2 MMOL/L (ref 3.5–5.1)
PROT UR QL STRIP: ABNORMAL
PROTHROMBIN TIME: 18.3 SEC (ref 10.6–14.8)
RBC # BLD AUTO: 4.49 M/UL (ref 4.6–6.2)
SODIUM SERPL-SCNC: 136 MMOL/L (ref 136–145)
SP GR UR STRIP: 1.01 (ref 1–1.03)
TRIGL SERPL-MCNC: 175 MG/DL (ref 30–150)
URN SPEC COLLECT METH UR: ABNORMAL
UROBILINOGEN UR STRIP-ACNC: NEGATIVE EU/DL
WBC # BLD AUTO: 5.98 K/UL (ref 3.9–12.7)

## 2020-02-17 PROCEDURE — 36415 COLL VENOUS BLD VENIPUNCTURE: CPT

## 2020-02-17 PROCEDURE — 81003 URINALYSIS AUTO W/O SCOPE: CPT

## 2020-02-17 PROCEDURE — 85610 PROTHROMBIN TIME: CPT

## 2020-02-17 PROCEDURE — 85730 THROMBOPLASTIN TIME PARTIAL: CPT

## 2020-02-17 PROCEDURE — 80061 LIPID PANEL: CPT

## 2020-02-17 PROCEDURE — 93005 ELECTROCARDIOGRAM TRACING: CPT

## 2020-02-17 PROCEDURE — 83735 ASSAY OF MAGNESIUM: CPT

## 2020-02-17 PROCEDURE — 80048 BASIC METABOLIC PNL TOTAL CA: CPT

## 2020-02-17 PROCEDURE — 85025 COMPLETE CBC W/AUTO DIFF WBC: CPT

## 2020-02-17 RX ORDER — LANOLIN ALCOHOL/MO/W.PET/CERES
400 CREAM (GRAM) TOPICAL DAILY
COMMUNITY

## 2020-02-20 ENCOUNTER — HOSPITAL ENCOUNTER (OUTPATIENT)
Facility: HOSPITAL | Age: 70
Discharge: HOME | End: 2020-02-20
Attending: INTERNAL MEDICINE | Admitting: INTERNAL MEDICINE
Payer: COMMERCIAL

## 2020-02-20 VITALS
RESPIRATION RATE: 19 BRPM | HEART RATE: 53 BPM | DIASTOLIC BLOOD PRESSURE: 61 MMHG | OXYGEN SATURATION: 92 % | SYSTOLIC BLOOD PRESSURE: 131 MMHG

## 2020-02-20 DIAGNOSIS — I25.10 CORONARY ARTERY DISEASE INVOLVING NATIVE CORONARY ARTERY OF NATIVE HEART WITHOUT ANGINA PECTORIS: ICD-10-CM

## 2020-02-20 DIAGNOSIS — R94.39 ABNORMAL STRESS TEST: ICD-10-CM

## 2020-02-20 LAB
POC ACTIVATED CLOTTING TIME K: 197 SEC (ref 74–137)
SAMPLE: ABNORMAL

## 2020-02-20 PROCEDURE — C1887 CATHETER, GUIDING: HCPCS | Performed by: INTERNAL MEDICINE

## 2020-02-20 PROCEDURE — C1760 CLOSURE DEV, VASC: HCPCS | Performed by: INTERNAL MEDICINE

## 2020-02-20 PROCEDURE — 63600175 PHARM REV CODE 636 W HCPCS: Performed by: INTERNAL MEDICINE

## 2020-02-20 PROCEDURE — C1769 GUIDE WIRE: HCPCS | Performed by: INTERNAL MEDICINE

## 2020-02-20 PROCEDURE — 99152 MOD SED SAME PHYS/QHP 5/>YRS: CPT | Performed by: INTERNAL MEDICINE

## 2020-02-20 PROCEDURE — 25000003 PHARM REV CODE 250: Performed by: INTERNAL MEDICINE

## 2020-02-20 PROCEDURE — C1894 INTRO/SHEATH, NON-LASER: HCPCS | Performed by: INTERNAL MEDICINE

## 2020-02-20 PROCEDURE — C1725 CATH, TRANSLUMIN NON-LASER: HCPCS | Performed by: INTERNAL MEDICINE

## 2020-02-20 PROCEDURE — 99153 MOD SED SAME PHYS/QHP EA: CPT | Performed by: INTERNAL MEDICINE

## 2020-02-20 PROCEDURE — 93458 L HRT ARTERY/VENTRICLE ANGIO: CPT

## 2020-02-20 DEVICE — IMPLANTABLE DEVICE: Type: IMPLANTABLE DEVICE | Site: GROIN | Status: FUNCTIONAL

## 2020-02-20 RX ORDER — SODIUM CHLORIDE 9 MG/ML
INJECTION, SOLUTION INTRAVENOUS
Status: DISCONTINUED | OUTPATIENT
Start: 2020-02-20 | End: 2020-02-20 | Stop reason: HOSPADM

## 2020-02-20 RX ORDER — HEPARIN SODIUM 10000 [USP'U]/ML
INJECTION, SOLUTION INTRAVENOUS; SUBCUTANEOUS
Status: DISCONTINUED | OUTPATIENT
Start: 2020-02-20 | End: 2020-02-20 | Stop reason: HOSPADM

## 2020-02-20 RX ORDER — OXYCODONE HYDROCHLORIDE 5 MG/1
10 TABLET ORAL EVERY 4 HOURS PRN
Status: DISCONTINUED | OUTPATIENT
Start: 2020-02-20 | End: 2020-02-20 | Stop reason: HOSPADM

## 2020-02-20 RX ORDER — ACETAMINOPHEN 325 MG/1
650 TABLET ORAL EVERY 4 HOURS PRN
Status: DISCONTINUED | OUTPATIENT
Start: 2020-02-20 | End: 2020-02-20 | Stop reason: HOSPADM

## 2020-02-20 RX ORDER — LORAZEPAM 1 MG/1
1 TABLET ORAL ONCE
Status: COMPLETED | OUTPATIENT
Start: 2020-02-20 | End: 2020-02-20

## 2020-02-20 RX ORDER — NITROGLYCERIN 400 UG/1
SPRAY ORAL
Status: DISCONTINUED | OUTPATIENT
Start: 2020-02-20 | End: 2020-02-20 | Stop reason: HOSPADM

## 2020-02-20 RX ORDER — ONDANSETRON 2 MG/ML
4 INJECTION INTRAMUSCULAR; INTRAVENOUS EVERY 12 HOURS PRN
Status: DISCONTINUED | OUTPATIENT
Start: 2020-02-20 | End: 2020-02-20 | Stop reason: HOSPADM

## 2020-02-20 RX ORDER — FENTANYL CITRATE 50 UG/ML
INJECTION, SOLUTION INTRAMUSCULAR; INTRAVENOUS
Status: DISCONTINUED | OUTPATIENT
Start: 2020-02-20 | End: 2020-02-20 | Stop reason: HOSPADM

## 2020-02-20 RX ORDER — HEPARIN SODIUM 1000 [USP'U]/ML
INJECTION, SOLUTION INTRAVENOUS; SUBCUTANEOUS
Status: DISCONTINUED | OUTPATIENT
Start: 2020-02-20 | End: 2020-02-20 | Stop reason: HOSPADM

## 2020-02-20 RX ORDER — NITROGLYCERIN 5 MG/ML
INJECTION, SOLUTION INTRAVENOUS
Status: DISCONTINUED | OUTPATIENT
Start: 2020-02-20 | End: 2020-02-20 | Stop reason: HOSPADM

## 2020-02-20 RX ORDER — PAPAVERINE HYDROCHLORIDE 30 MG/ML
INJECTION INTRAMUSCULAR; INTRAVENOUS
Status: DISCONTINUED | OUTPATIENT
Start: 2020-02-20 | End: 2020-02-20 | Stop reason: HOSPADM

## 2020-02-20 RX ORDER — HYDROMORPHONE HYDROCHLORIDE 1 MG/ML
INJECTION, SOLUTION INTRAMUSCULAR; INTRAVENOUS; SUBCUTANEOUS
Status: DISCONTINUED | OUTPATIENT
Start: 2020-02-20 | End: 2020-02-20 | Stop reason: HOSPADM

## 2020-02-20 RX ORDER — VERAPAMIL HYDROCHLORIDE 2.5 MG/ML
INJECTION, SOLUTION INTRAVENOUS
Status: DISCONTINUED | OUTPATIENT
Start: 2020-02-20 | End: 2020-02-20 | Stop reason: HOSPADM

## 2020-02-20 RX ORDER — MIDAZOLAM HYDROCHLORIDE 1 MG/ML
INJECTION INTRAMUSCULAR; INTRAVENOUS
Status: DISCONTINUED | OUTPATIENT
Start: 2020-02-20 | End: 2020-02-20 | Stop reason: HOSPADM

## 2020-02-20 RX ORDER — HYDROCODONE BITARTRATE AND ACETAMINOPHEN 5; 325 MG/1; MG/1
1 TABLET ORAL EVERY 4 HOURS PRN
Status: DISCONTINUED | OUTPATIENT
Start: 2020-02-20 | End: 2020-02-20 | Stop reason: HOSPADM

## 2020-02-20 RX ORDER — SODIUM CHLORIDE 450 MG/100ML
INJECTION, SOLUTION INTRAVENOUS CONTINUOUS
Status: DISCONTINUED | OUTPATIENT
Start: 2020-02-20 | End: 2020-02-20 | Stop reason: HOSPADM

## 2020-02-20 RX ORDER — LIDOCAINE HYDROCHLORIDE 10 MG/ML
INJECTION, SOLUTION EPIDURAL; INFILTRATION; INTRACAUDAL; PERINEURAL
Status: DISCONTINUED | OUTPATIENT
Start: 2020-02-20 | End: 2020-02-20 | Stop reason: HOSPADM

## 2020-02-20 RX ADMIN — SODIUM CHLORIDE: 0.45 INJECTION, SOLUTION INTRAVENOUS at 11:02

## 2020-02-20 RX ADMIN — LORAZEPAM 1 MG: 1 TABLET ORAL at 06:02

## 2020-02-20 NOTE — Clinical Note
Catheter is repositioned to the ostium   right coronary artery. Angiography performed  in multiple views.Unable to engage.

## 2020-02-20 NOTE — DISCHARGE INSTRUCTIONS
Post angiogram discharge care   You have a puncture site in you right groin   You can remove your current dressing in 24 hours and take a shower. Showers only for the next week. Do not sit in a bathtub or pool of water 7 days until the puncture site is healed.    Gently clean the puncture site daily using soap and water while showering, dry thoroughly and cover with a Band-Aid. Make sure the Band-Aid and puncture site stay clean and dry.   Inspect the site daily for tenderness, discharge or signs of infection.    Observe the puncture site for signs of bleeding, obvious oozing, formation on knot under skin, or bruising. If any of these were to occur you should immediately lie down flat and apply firm pressure at the insertion site for 10 minutes. If bleeding or swelling does not stop, call 911! Do NOT try to drive yourself to the hospital.    Drink at least 6-8 glasses of clear liquids durin the next 24 hours to flush out the dye.   You must not be alone for the next 24 hours.    You may experience soreness or tenderness that my last for 1 week.    You may have mild oozing from the puncture site and/or possible bruising that could last up to 2 weeks.   You may also have a small lump form that may last up to 6 weeks.        Activity   Do not drive or operate an automobile or hazardous machinery for 24 hours   Do note engage in sports for 1 week.   Limit lifting over 10 pounds for the nest week, or until puncture site heals.   Limit you activities for the next 48 hours. Avoid excessive bending or squatting.   You may resume normal activity in 2-3 days, let pain be your guide.    Call your Doctor immediatly if you experience any of the following:   Chest pain, palpitations, shortness of breath, excessive dizziness, fainting, nausea or vomiting.   Armaan signs of infection: redness, warmth, swelling, pain, drainage (other than a little blood on bandaide), chills, poorly healing puncture site, fever  greater than 101.0 F   Change in color, coolness, swelling, numbness, or pain to the involved extremity   Significant bleeding from the puncture site.        Post Radial Cath Discharge Instructions   Keep puncture site covered with current bandage for 24 hours.   You may shower in 24 hours. Do not take a tub bath or submerge the puncture site in water for 72 hours.    You may cover the site with a Band-Aid. Keep Band-Aid clean and dry at all times.     No lifting over 5 pounds with the arm you puncture site is on for 72 hours.   No strenuous activity such as bowling, tennis, etc for 72 hours   Do not operate lawnmower, motorcycle, chainsaw, or all terrain vehicle for 72 hours.   No blood pressure or tourniquets on affected arm for 72 hours.    The puncture site may be slightly bruised and sore following your procedure.    Drink 6-8 glasses of clear liquids during the next 24 hours to flush the dye out.     If Bleeding Occurs, DO NOT PANIC   Place 1 or 2 fingers over the puncture site and hold firm pressure to stop bleeding. You may be able to feel your pulse as you hold pressure   Lift you fingers after 5 minutes to see if the bleeding stopped.   Once has stopped, gently wipe the wrist area clean and cover with a bandage.   If the bleeding does not stop after 30 minutes, or if there is a large amount of bleeding or spurting, call 911! Do not drive yourself to the hospital.     Should any of the following occur, Contact your Physician Immediately:   Redness, inflamation, swelling, chills or fever, or discolred drainage at procedure site    Coldness, discoloration, ongoing numbness, severe pain, or swelling. Expect mild tingling of hand and tenderness at the puncture site for up to 3 days. If this persists or other symptoms develop, notify your physician

## 2020-02-20 NOTE — Clinical Note
Radial artery spasm, MD awaiting therapeutic effect of nitroglycerin before attempting catheter removal

## 2020-02-24 LAB — CATH EF ESTIMATED: 60 %

## 2020-02-27 ENCOUNTER — HOSPITAL ENCOUNTER (OUTPATIENT)
Facility: HOSPITAL | Age: 70
Discharge: HOME | End: 2020-02-28
Attending: INTERNAL MEDICINE | Admitting: INTERNAL MEDICINE
Payer: COMMERCIAL

## 2020-02-27 DIAGNOSIS — I25.10 CORONARY ARTERY DISEASE, ANGINA PRESENCE UNSPECIFIED, UNSPECIFIED VESSEL OR LESION TYPE, UNSPECIFIED WHETHER NATIVE OR TRANSPLANTED HEART: ICD-10-CM

## 2020-02-27 DIAGNOSIS — I25.10 CAD (CORONARY ARTERY DISEASE): ICD-10-CM

## 2020-02-27 DIAGNOSIS — I25.118 CORONARY ARTERY DISEASE WITH OTHER FORM OF ANGINA PECTORIS, UNSPECIFIED VESSEL OR LESION TYPE, UNSPECIFIED WHETHER NATIVE OR TRANSPLANTED HEART: Primary | ICD-10-CM

## 2020-02-27 LAB
ANION GAP SERPL CALC-SCNC: 7 MMOL/L (ref 8–16)
BASOPHILS # BLD AUTO: 0.03 K/UL (ref 0–0.2)
BASOPHILS NFR BLD: 0.5 % (ref 0–1.9)
BUN SERPL-MCNC: 12 MG/DL (ref 8–23)
CALCIUM SERPL-MCNC: 9.3 MG/DL (ref 8.7–10.5)
CHLORIDE SERPL-SCNC: 105 MMOL/L (ref 95–110)
CHOLEST SERPL-MCNC: 123 MG/DL (ref 120–199)
CHOLEST/HDLC SERPL: 3 {RATIO} (ref 2–5)
CO2 SERPL-SCNC: 28 MMOL/L (ref 23–29)
CREAT SERPL-MCNC: 0.9 MG/DL (ref 0.5–1.4)
DIFFERENTIAL METHOD: ABNORMAL
EOSINOPHIL # BLD AUTO: 0.1 K/UL (ref 0–0.5)
EOSINOPHIL NFR BLD: 1.9 % (ref 0–8)
ERYTHROCYTE [DISTWIDTH] IN BLOOD BY AUTOMATED COUNT: 13.2 % (ref 11.5–14.5)
EST. GFR  (AFRICAN AMERICAN): >60 ML/MIN/1.73 M^2
EST. GFR  (NON AFRICAN AMERICAN): >60 ML/MIN/1.73 M^2
GLUCOSE SERPL-MCNC: 113 MG/DL (ref 70–110)
HCT VFR BLD AUTO: 46.4 % (ref 40–54)
HDLC SERPL-MCNC: 41 MG/DL (ref 40–75)
HDLC SERPL: 33.3 % (ref 20–50)
HGB BLD-MCNC: 15.4 G/DL (ref 14–18)
IMM GRANULOCYTES # BLD AUTO: 0.01 K/UL (ref 0–0.04)
IMM GRANULOCYTES NFR BLD AUTO: 0.2 % (ref 0–0.5)
LDLC SERPL CALC-MCNC: 66.2 MG/DL (ref 63–159)
LYMPHOCYTES # BLD AUTO: 1.5 K/UL (ref 1–4.8)
LYMPHOCYTES NFR BLD: 25.6 % (ref 18–48)
MCH RBC QN AUTO: 33.6 PG (ref 27–31)
MCHC RBC AUTO-ENTMCNC: 33.2 G/DL (ref 32–36)
MCV RBC AUTO: 101 FL (ref 82–98)
MONOCYTES # BLD AUTO: 0.6 K/UL (ref 0.3–1)
MONOCYTES NFR BLD: 10 % (ref 4–15)
NEUTROPHILS # BLD AUTO: 3.6 K/UL (ref 1.8–7.7)
NEUTROPHILS NFR BLD: 61.8 % (ref 38–73)
NONHDLC SERPL-MCNC: 82 MG/DL
NRBC BLD-RTO: 0 /100 WBC
PLATELET # BLD AUTO: 159 K/UL (ref 150–350)
PMV BLD AUTO: 10.7 FL (ref 9.2–12.9)
POC ACTIVATED CLOTTING TIME K: 208 SEC (ref 74–137)
POTASSIUM SERPL-SCNC: 4.6 MMOL/L (ref 3.5–5.1)
RBC # BLD AUTO: 4.59 M/UL (ref 4.6–6.2)
SAMPLE: ABNORMAL
SODIUM SERPL-SCNC: 140 MMOL/L (ref 136–145)
TRIGL SERPL-MCNC: 79 MG/DL (ref 30–150)
WBC # BLD AUTO: 5.82 K/UL (ref 3.9–12.7)

## 2020-02-27 PROCEDURE — C1894 INTRO/SHEATH, NON-LASER: HCPCS | Performed by: INTERNAL MEDICINE

## 2020-02-27 PROCEDURE — 93005 ELECTROCARDIOGRAM TRACING: CPT | Mod: 59

## 2020-02-27 PROCEDURE — 85025 COMPLETE CBC W/AUTO DIFF WBC: CPT

## 2020-02-27 PROCEDURE — 94761 N-INVAS EAR/PLS OXIMETRY MLT: CPT

## 2020-02-27 PROCEDURE — 80048 BASIC METABOLIC PNL TOTAL CA: CPT

## 2020-02-27 PROCEDURE — 63600175 PHARM REV CODE 636 W HCPCS: Performed by: INTERNAL MEDICINE

## 2020-02-27 PROCEDURE — 93454 CORONARY ARTERY ANGIO S&I: CPT | Mod: XU

## 2020-02-27 PROCEDURE — C1760 CLOSURE DEV, VASC: HCPCS | Performed by: INTERNAL MEDICINE

## 2020-02-27 PROCEDURE — 25000003 PHARM REV CODE 250: Performed by: INTERNAL MEDICINE

## 2020-02-27 PROCEDURE — C1769 GUIDE WIRE: HCPCS | Performed by: INTERNAL MEDICINE

## 2020-02-27 PROCEDURE — C1874 STENT, COATED/COV W/DEL SYS: HCPCS | Performed by: INTERNAL MEDICINE

## 2020-02-27 PROCEDURE — C9600 PERC DRUG-EL COR STENT SING: HCPCS | Mod: LD

## 2020-02-27 PROCEDURE — 25500020 PHARM REV CODE 255: Performed by: INTERNAL MEDICINE

## 2020-02-27 PROCEDURE — 99153 MOD SED SAME PHYS/QHP EA: CPT | Performed by: INTERNAL MEDICINE

## 2020-02-27 PROCEDURE — 80061 LIPID PANEL: CPT

## 2020-02-27 PROCEDURE — 99152 MOD SED SAME PHYS/QHP 5/>YRS: CPT | Performed by: INTERNAL MEDICINE

## 2020-02-27 PROCEDURE — 27800903 OPTIME MED/SURG SUP & DEVICES OTHER IMPLANTS: Performed by: INTERNAL MEDICINE

## 2020-02-27 PROCEDURE — C1887 CATHETER, GUIDING: HCPCS | Performed by: INTERNAL MEDICINE

## 2020-02-27 DEVICE — ANGIO-SEAL VIP VASCULAR CLOSURE DEVICE
Type: IMPLANTABLE DEVICE | Site: HEART | Status: FUNCTIONAL
Brand: ANGIO-SEAL

## 2020-02-27 DEVICE — STENT RONYX27526UX RESOLUTE ONYX 2.75X26
Type: IMPLANTABLE DEVICE | Site: HEART | Status: FUNCTIONAL
Brand: RESOLUTE ONYX™

## 2020-02-27 DEVICE — STENT RONYX30022UX RESOLUTE ONYX 3.00X22
Type: IMPLANTABLE DEVICE | Site: HEART | Status: FUNCTIONAL
Brand: RESOLUTE ONYX™

## 2020-02-27 DEVICE — STENT RONYX25015UX RESOLUTE ONYX 2.50X15
Type: IMPLANTABLE DEVICE | Site: HEART | Status: FUNCTIONAL
Brand: RESOLUTE ONYX™

## 2020-02-27 RX ORDER — ALLOPURINOL 100 MG/1
200 TABLET ORAL DAILY
Status: DISCONTINUED | OUTPATIENT
Start: 2020-02-28 | End: 2020-02-28 | Stop reason: HOSPADM

## 2020-02-27 RX ORDER — METOPROLOL SUCCINATE 25 MG/1
25 TABLET, EXTENDED RELEASE ORAL DAILY
Status: DISCONTINUED | OUTPATIENT
Start: 2020-02-28 | End: 2020-02-28 | Stop reason: HOSPADM

## 2020-02-27 RX ORDER — SIMVASTATIN 20 MG/1
20 TABLET, FILM COATED ORAL NIGHTLY
Status: DISCONTINUED | OUTPATIENT
Start: 2020-02-28 | End: 2020-02-28 | Stop reason: HOSPADM

## 2020-02-27 RX ORDER — ASPIRIN 81 MG/1
81 TABLET ORAL DAILY
Status: DISCONTINUED | OUTPATIENT
Start: 2020-02-28 | End: 2020-02-28 | Stop reason: HOSPADM

## 2020-02-27 RX ORDER — SODIUM CHLORIDE 9 MG/ML
INJECTION, SOLUTION INTRAVENOUS CONTINUOUS
Status: ACTIVE | OUTPATIENT
Start: 2020-02-27 | End: 2020-02-27

## 2020-02-27 RX ORDER — HEPARIN SODIUM 10000 [USP'U]/ML
INJECTION, SOLUTION INTRAVENOUS; SUBCUTANEOUS
Status: DISCONTINUED | OUTPATIENT
Start: 2020-02-27 | End: 2020-02-27 | Stop reason: HOSPADM

## 2020-02-27 RX ORDER — LIDOCAINE HYDROCHLORIDE 10 MG/ML
INJECTION, SOLUTION EPIDURAL; INFILTRATION; INTRACAUDAL; PERINEURAL
Status: DISCONTINUED | OUTPATIENT
Start: 2020-02-27 | End: 2020-02-27 | Stop reason: HOSPADM

## 2020-02-27 RX ORDER — TEMAZEPAM 15 MG/1
15 CAPSULE ORAL NIGHTLY PRN
Status: DISCONTINUED | OUTPATIENT
Start: 2020-02-27 | End: 2020-02-28 | Stop reason: HOSPADM

## 2020-02-27 RX ORDER — SODIUM CHLORIDE 9 MG/ML
INJECTION, SOLUTION INTRAVENOUS CONTINUOUS
Status: DISCONTINUED | OUTPATIENT
Start: 2020-02-27 | End: 2020-02-27

## 2020-02-27 RX ORDER — ACETAMINOPHEN 325 MG/1
650 TABLET ORAL EVERY 4 HOURS PRN
Status: DISCONTINUED | OUTPATIENT
Start: 2020-02-27 | End: 2020-02-28 | Stop reason: HOSPADM

## 2020-02-27 RX ORDER — HYDRALAZINE HYDROCHLORIDE 20 MG/ML
INJECTION INTRAMUSCULAR; INTRAVENOUS
Status: DISCONTINUED | OUTPATIENT
Start: 2020-02-27 | End: 2020-02-27 | Stop reason: HOSPADM

## 2020-02-27 RX ORDER — FENTANYL CITRATE 50 UG/ML
INJECTION, SOLUTION INTRAMUSCULAR; INTRAVENOUS
Status: DISCONTINUED | OUTPATIENT
Start: 2020-02-27 | End: 2020-02-27 | Stop reason: HOSPADM

## 2020-02-27 RX ORDER — MIDAZOLAM HYDROCHLORIDE 1 MG/ML
INJECTION INTRAMUSCULAR; INTRAVENOUS
Status: DISCONTINUED | OUTPATIENT
Start: 2020-02-27 | End: 2020-02-27 | Stop reason: HOSPADM

## 2020-02-27 RX ORDER — CLOPIDOGREL BISULFATE 75 MG/1
TABLET ORAL
Status: DISCONTINUED | OUTPATIENT
Start: 2020-02-27 | End: 2020-02-27 | Stop reason: HOSPADM

## 2020-02-27 RX ORDER — LORAZEPAM 1 MG/1
1 TABLET ORAL
Status: DISCONTINUED | OUTPATIENT
Start: 2020-02-27 | End: 2020-02-28 | Stop reason: HOSPADM

## 2020-02-27 RX ORDER — CAPTOPRIL 12.5 MG/1
12.5 TABLET ORAL 2 TIMES DAILY
Status: DISCONTINUED | OUTPATIENT
Start: 2020-02-27 | End: 2020-02-28 | Stop reason: HOSPADM

## 2020-02-27 RX ORDER — ASPIRIN 325 MG
TABLET ORAL
Status: DISCONTINUED | OUTPATIENT
Start: 2020-02-27 | End: 2020-02-27 | Stop reason: HOSPADM

## 2020-02-27 RX ORDER — PROMETHAZINE HYDROCHLORIDE 25 MG/1
25 TABLET ORAL EVERY 6 HOURS PRN
Status: DISCONTINUED | OUTPATIENT
Start: 2020-02-27 | End: 2020-02-28 | Stop reason: HOSPADM

## 2020-02-27 RX ORDER — CLOPIDOGREL BISULFATE 75 MG/1
75 TABLET ORAL DAILY
Status: DISCONTINUED | OUTPATIENT
Start: 2020-02-28 | End: 2020-02-28 | Stop reason: HOSPADM

## 2020-02-27 RX ORDER — NITROGLYCERIN 5 MG/ML
INJECTION, SOLUTION INTRAVENOUS
Status: DISCONTINUED | OUTPATIENT
Start: 2020-02-27 | End: 2020-02-27 | Stop reason: HOSPADM

## 2020-02-27 RX ADMIN — SODIUM CHLORIDE: 0.9 INJECTION, SOLUTION INTRAVENOUS at 07:02

## 2020-02-27 RX ADMIN — TEMAZEPAM 15 MG: 15 CAPSULE ORAL at 10:02

## 2020-02-27 RX ADMIN — ACETAMINOPHEN 650 MG: 325 TABLET ORAL at 01:02

## 2020-02-27 RX ADMIN — SODIUM CHLORIDE: 0.9 INJECTION, SOLUTION INTRAVENOUS at 11:02

## 2020-02-27 NOTE — Clinical Note
ostium   left anterior descending. Angiography performed post intervention of the left coronary arteries. Angiography performed via power injection with 8 mL contrast at 4 mL/sPower injection PSI was 450..

## 2020-02-27 NOTE — Clinical Note
70 ml injected throughout the case. 50 mL total wasted during the case. 120 mL total used in the case.

## 2020-02-27 NOTE — PLAN OF CARE
1620 to floor via stretcher bedside handoff to ulices weber pt's right groin soft non tender upon palpation dsg cdi no co pain no acute distress noted

## 2020-02-28 VITALS
HEART RATE: 57 BPM | WEIGHT: 268.94 LBS | SYSTOLIC BLOOD PRESSURE: 144 MMHG | TEMPERATURE: 99 F | DIASTOLIC BLOOD PRESSURE: 67 MMHG | OXYGEN SATURATION: 95 % | BODY MASS INDEX: 35.64 KG/M2 | HEIGHT: 73 IN | RESPIRATION RATE: 19 BRPM

## 2020-02-28 PROCEDURE — 25000003 PHARM REV CODE 250: Performed by: INTERNAL MEDICINE

## 2020-02-28 PROCEDURE — 94761 N-INVAS EAR/PLS OXIMETRY MLT: CPT

## 2020-02-28 RX ORDER — ASPIRIN 81 MG/1
81 TABLET ORAL DAILY
Qty: 30 TABLET | Refills: 0
Start: 2020-02-28 | End: 2020-02-28 | Stop reason: SDUPTHER

## 2020-02-28 RX ORDER — ASPIRIN 81 MG/1
81 TABLET ORAL DAILY
Qty: 30 TABLET | Refills: 0
Start: 2020-02-28 | End: 2021-02-17

## 2020-02-28 RX ORDER — CLOPIDOGREL BISULFATE 75 MG/1
75 TABLET ORAL DAILY
Qty: 30 TABLET | Refills: 11 | Status: SHIPPED | OUTPATIENT
Start: 2020-02-28 | End: 2021-03-01 | Stop reason: SDUPTHER

## 2020-02-28 RX ADMIN — CLOPIDOGREL BISULFATE 75 MG: 75 TABLET, FILM COATED ORAL at 09:02

## 2020-02-28 NOTE — PROGRESS NOTES
Cardiac Rehab     Mattmiguelina Rae   [id number]   2/28/2020         Cardiac Rehab Phase Taught: Phase 1    Teaching Method: Verbal, Written    Handouts: Cardiac Rehab Tear Sheet and Phase 2 Appointment Sheet    Educational Videos: None    Understanding:  Learning indicated by feedback and Verbalize understanding    Comments: Education on post stent care and activities, medications( plavix ) and cardiac rehab. Orientation appointment scheduled on 03/03/2020 @ 0930. Will follow up outpt. Questions answered.    Total Time Spent: 15 mins            Marisa Saldivar RN

## 2020-02-28 NOTE — PLAN OF CARE
02/28/20 0752   Patient Assessment/Suction   Level of Consciousness (AVPU) alert   Respiratory Effort Normal;Unlabored   Expansion/Accessory Muscles/Retractions no use of accessory muscles   PRE-TX-O2   O2 Device (Oxygen Therapy) room air   SpO2 95 %   Pulse Oximetry Type Continuous   $ Pulse Oximetry - Multiple Charge Pulse Oximetry - Multiple   Pulse (!) 57   Resp 19

## 2020-03-02 ENCOUNTER — TELEPHONE (OUTPATIENT)
Dept: CARDIAC REHAB | Facility: HOSPITAL | Age: 70
End: 2020-03-02

## 2020-03-02 NOTE — TELEPHONE ENCOUNTER
Matt Rae   [id number]   3/2/2020         Spoke with: pt    Received Medications?:yes    Follow Up Appt?:yes    Cardio Pulmonary Rehab Appt?:yes    Comments: pt states he is doing well, has appt for cardiac rehab 03/03/2020. Pt info to carolyn Torres RN

## 2020-03-03 ENCOUNTER — CLINICAL SUPPORT (OUTPATIENT)
Dept: CARDIAC REHAB | Facility: HOSPITAL | Age: 70
End: 2020-03-03
Attending: INTERNAL MEDICINE
Payer: COMMERCIAL

## 2020-03-03 DIAGNOSIS — Z95.5 S/P CORONARY ARTERY STENT PLACEMENT: Primary | ICD-10-CM

## 2020-03-03 PROCEDURE — 93797 PHYS/QHP OP CAR RHAB WO ECG: CPT

## 2020-03-10 ENCOUNTER — CLINICAL SUPPORT (OUTPATIENT)
Dept: CARDIAC REHAB | Facility: HOSPITAL | Age: 70
End: 2020-03-10
Attending: INTERNAL MEDICINE
Payer: COMMERCIAL

## 2020-03-10 DIAGNOSIS — Z95.5 S/P CORONARY ARTERY STENT PLACEMENT: ICD-10-CM

## 2020-03-10 PROCEDURE — 93798 PHYS/QHP OP CAR RHAB W/ECG: CPT

## 2020-07-02 LAB
ALBUMIN SERPL-MCNC: 4.2 G/DL (ref 3.6–5.1)
ALBUMIN/GLOB SERPL: 1.6 (CALC) (ref 1–2.5)
ALP SERPL-CCNC: 59 U/L (ref 35–144)
ALT SERPL-CCNC: 19 U/L (ref 9–46)
AST SERPL-CCNC: 18 U/L (ref 10–35)
BILIRUB SERPL-MCNC: 0.7 MG/DL (ref 0.2–1.2)
BUN SERPL-MCNC: 19 MG/DL (ref 7–25)
BUN/CREAT SERPL: ABNORMAL (CALC) (ref 6–22)
CALCIUM SERPL-MCNC: 9.7 MG/DL (ref 8.6–10.3)
CHLORIDE SERPL-SCNC: 104 MMOL/L (ref 98–110)
CO2 SERPL-SCNC: 27 MMOL/L (ref 20–32)
CREAT SERPL-MCNC: 0.97 MG/DL (ref 0.7–1.25)
GFRSERPLBLD MDRD-ARVRAT: 80 ML/MIN/1.73M2
GLOBULIN SER CALC-MCNC: 2.7 G/DL (CALC) (ref 1.9–3.7)
GLUCOSE SERPL-MCNC: 103 MG/DL (ref 65–99)
POTASSIUM SERPL-SCNC: 4.5 MMOL/L (ref 3.5–5.3)
PROT SERPL-MCNC: 6.9 G/DL (ref 6.1–8.1)
SODIUM SERPL-SCNC: 139 MMOL/L (ref 135–146)

## 2020-07-21 ENCOUNTER — OFFICE VISIT (OUTPATIENT)
Dept: HEMATOLOGY/ONCOLOGY | Facility: CLINIC | Age: 70
End: 2020-07-21
Payer: COMMERCIAL

## 2020-07-21 VITALS
SYSTOLIC BLOOD PRESSURE: 135 MMHG | WEIGHT: 264.31 LBS | DIASTOLIC BLOOD PRESSURE: 81 MMHG | RESPIRATION RATE: 19 BRPM | BODY MASS INDEX: 34.87 KG/M2 | HEART RATE: 60 BPM | TEMPERATURE: 98 F

## 2020-07-21 DIAGNOSIS — I25.118 CORONARY ARTERY DISEASE WITH OTHER FORM OF ANGINA PECTORIS, UNSPECIFIED VESSEL OR LESION TYPE, UNSPECIFIED WHETHER NATIVE OR TRANSPLANTED HEART: ICD-10-CM

## 2020-07-21 DIAGNOSIS — D75.89 MACROCYTOSIS WITHOUT ANEMIA: Chronic | ICD-10-CM

## 2020-07-21 DIAGNOSIS — Z86.711 HISTORY OF PULMONARY EMBOLUS (PE): Chronic | ICD-10-CM

## 2020-07-21 PROCEDURE — 99213 PR OFFICE/OUTPT VISIT, EST, LEVL III, 20-29 MIN: ICD-10-PCS | Mod: S$GLB,,, | Performed by: INTERNAL MEDICINE

## 2020-07-21 PROCEDURE — 3079F DIAST BP 80-89 MM HG: CPT | Mod: S$GLB,,, | Performed by: INTERNAL MEDICINE

## 2020-07-21 PROCEDURE — 1159F PR MEDICATION LIST DOCUMENTED IN MEDICAL RECORD: ICD-10-PCS | Mod: S$GLB,,, | Performed by: INTERNAL MEDICINE

## 2020-07-21 PROCEDURE — 3075F SYST BP GE 130 - 139MM HG: CPT | Mod: S$GLB,,, | Performed by: INTERNAL MEDICINE

## 2020-07-21 PROCEDURE — 1101F PT FALLS ASSESS-DOCD LE1/YR: CPT | Mod: S$GLB,,, | Performed by: INTERNAL MEDICINE

## 2020-07-21 PROCEDURE — 99213 OFFICE O/P EST LOW 20 MIN: CPT | Mod: S$GLB,,, | Performed by: INTERNAL MEDICINE

## 2020-07-21 PROCEDURE — 3008F PR BODY MASS INDEX (BMI) DOCUMENTED: ICD-10-PCS | Mod: S$GLB,,, | Performed by: INTERNAL MEDICINE

## 2020-07-21 PROCEDURE — 3079F PR MOST RECENT DIASTOLIC BLOOD PRESSURE 80-89 MM HG: ICD-10-PCS | Mod: S$GLB,,, | Performed by: INTERNAL MEDICINE

## 2020-07-21 PROCEDURE — 3008F BODY MASS INDEX DOCD: CPT | Mod: S$GLB,,, | Performed by: INTERNAL MEDICINE

## 2020-07-21 PROCEDURE — 1101F PR PT FALLS ASSESS DOC 0-1 FALLS W/OUT INJ PAST YR: ICD-10-PCS | Mod: S$GLB,,, | Performed by: INTERNAL MEDICINE

## 2020-07-21 PROCEDURE — 3075F PR MOST RECENT SYSTOLIC BLOOD PRESS GE 130-139MM HG: ICD-10-PCS | Mod: S$GLB,,, | Performed by: INTERNAL MEDICINE

## 2020-07-21 PROCEDURE — 1125F AMNT PAIN NOTED PAIN PRSNT: CPT | Mod: S$GLB,,, | Performed by: INTERNAL MEDICINE

## 2020-07-21 PROCEDURE — 1125F PR PAIN SEVERITY QUANTIFIED, PAIN PRESENT: ICD-10-PCS | Mod: S$GLB,,, | Performed by: INTERNAL MEDICINE

## 2020-07-21 PROCEDURE — 1159F MED LIST DOCD IN RCRD: CPT | Mod: S$GLB,,, | Performed by: INTERNAL MEDICINE

## 2020-07-21 NOTE — PROGRESS NOTES
PROGRESS NOTE    Subjective:       Patient ID: Matt Rae is a 70 y.o. male.    Chief Complaint:  No chief complaint on file.  Hypercoag state,(ACL) prior PE.      History of Present Illness:   Matt Rae is a 70 y.o. male who presents with a history of hypercoag state and macrocytosis.  Patient is doing well without new complaints at this time.     Continues on Xarelto 15mg daily.     Patient had 3 coronary stents placed Feb 2020.     Labs 10/7/2019:    Hb  14.7g/dl    Family and Social history reviewed and is unchanged from 12/9/2014.      ROS:  Review of Systems   Constitutional: Negative for fever and unexpected weight change.   HENT: Negative for nosebleeds.    Respiratory: Negative for chest tightness and shortness of breath.    Cardiovascular: Negative for chest pain.   Gastrointestinal: Negative for abdominal pain and blood in stool.   Genitourinary: Negative for hematuria.   Skin: Negative for rash.   Hematological: Does not bruise/bleed easily.          Current Outpatient Medications:     allopurinol (ZYLOPRIM) 100 MG tablet, 300 mg once daily. , Disp: , Rfl: 2    b complex vitamins tablet, Take 1 tablet by mouth once daily., Disp: , Rfl:     captopril (CAPOTEN) 12.5 MG tablet, Take 12.5 mg by mouth 2 (two) times daily. , Disp: , Rfl:     clopidogreL (PLAVIX) 75 mg tablet, Take 1 tablet (75 mg total) by mouth once daily., Disp: 30 tablet, Rfl: 11    magnesium oxide (MAG-OX) 400 mg (241.3 mg magnesium) tablet, Take 250 mg by mouth once daily., Disp: , Rfl:     methocarbamol (ROBAXIN) 750 MG Tab, Take 750 mg by mouth as needed. , Disp: , Rfl:     metoprolol succinate (TOPROL-XL) 25 MG 24 hr tablet, Take 25 mg by mouth once daily. , Disp: , Rfl:     multivitamin capsule, Take 1 capsule by mouth once daily.  , Disp: , Rfl:     simvastatin (ZOCOR) 20 MG tablet, Take 10 mg by mouth every evening. , Disp: , Rfl:     tadalafil  (CIALIS) 20 MG Tab, Take 20 mg by mouth once daily. , Disp: , Rfl:     tramadol (ULTRAM) 50 mg tablet, Take 50 mg by mouth once daily. , Disp: , Rfl:     XARELTO 15 mg Tab, 15 mg once daily. , Disp: , Rfl:     aspirin (ECOTRIN) 81 MG EC tablet, Take 1 tablet (81 mg total) by mouth once daily. Stop taking 3/29/2020, Disp: 30 tablet, Rfl: 0        Objective:       Physical Examination:     /81   Pulse 60   Temp 97.5 °F (36.4 °C)   Resp 19   Wt 119.9 kg (264 lb 4.8 oz)   BMI 34.87 kg/m²     Physical Exam  Vitals signs reviewed.   Constitutional:       Appearance: He is well-developed.   HENT:      Head: Normocephalic and atraumatic.      Right Ear: External ear normal.      Left Ear: External ear normal.   Eyes:      General: No scleral icterus.     Conjunctiva/sclera: Conjunctivae normal.      Pupils: Pupils are equal, round, and reactive to light.   Neck:      Musculoskeletal: Normal range of motion and neck supple.   Cardiovascular:      Rate and Rhythm: Normal rate and regular rhythm.      Heart sounds: Normal heart sounds. No murmur. No friction rub. No gallop.    Pulmonary:      Effort: Pulmonary effort is normal. No respiratory distress.      Breath sounds: Normal breath sounds. No rales.   Chest:      Chest wall: No tenderness.   Abdominal:      General: Bowel sounds are normal. There is no distension.      Palpations: Abdomen is soft. There is no mass.      Tenderness: There is no abdominal tenderness. There is no guarding or rebound.   Lymphadenopathy:      Head:      Right side of head: No tonsillar adenopathy.      Left side of head: No tonsillar adenopathy.      Cervical: No cervical adenopathy.      Upper Body:      Right upper body: No supraclavicular adenopathy.      Left upper body: No supraclavicular adenopathy.   Neurological:      Mental Status: He is alert and oriented to person, place, and time.   Psychiatric:         Behavior: Behavior normal.         Thought Content: Thought  content normal.         Judgment: Judgment normal.         Labs:   No results found for this or any previous visit (from the past 336 hour(s)).  CMP  Sodium   Date Value Ref Range Status   07/01/2020 139 135 - 146 mmol/L Final   03/07/2019 139 134 - 144 mmol/L      Potassium   Date Value Ref Range Status   07/01/2020 4.5 3.5 - 5.3 mmol/L Final     Chloride   Date Value Ref Range Status   07/01/2020 104 98 - 110 mmol/L Final   03/07/2019 106 98 - 110 mmol/L      CO2   Date Value Ref Range Status   07/01/2020 27 20 - 32 mmol/L Final     Glucose   Date Value Ref Range Status   07/01/2020 103 (H) 65 - 99 mg/dL Final     Comment:                   Fasting reference interval     For someone without known diabetes, a glucose value  between 100 and 125 mg/dL is consistent with  prediabetes and should be confirmed with a  follow-up test.        03/07/2019 125 (H) 70 - 99 mg/dL      BUN, Bld   Date Value Ref Range Status   07/01/2020 19 7 - 25 mg/dL Final     Creatinine   Date Value Ref Range Status   07/01/2020 0.97 0.70 - 1.25 mg/dL Final     Comment:     For patients >49 years of age, the reference limit  for Creatinine is approximately 13% higher for people  identified as -American.        03/07/2019 0.99 0.60 - 1.40 mg/dL    01/27/2013 1.0 0.5 - 1.4 mg/dL Final     Calcium   Date Value Ref Range Status   07/01/2020 9.7 8.6 - 10.3 mg/dL Final   01/27/2013 9.5 8.7 - 10.5 mg/dL Final     Total Protein   Date Value Ref Range Status   07/01/2020 6.9 6.1 - 8.1 g/dL Final     Albumin   Date Value Ref Range Status   07/01/2020 4.2 3.6 - 5.1 g/dL Final   03/07/2019 3.6 3.1 - 4.7 g/dL      Total Bilirubin   Date Value Ref Range Status   07/01/2020 0.7 0.2 - 1.2 mg/dL Final     Alkaline Phosphatase   Date Value Ref Range Status   07/01/2020 59 35 - 144 U/L Final   01/27/2013 63 55 - 135 U/L Final     AST ([place])   Date Value Ref Range Status   03/09/2016 29 17 - 59 U/L Final     AST   Date Value Ref Range Status    07/01/2020 18 10 - 35 U/L Final     ALT   Date Value Ref Range Status   07/01/2020 19 9 - 46 U/L Final     Anion Gap   Date Value Ref Range Status   02/27/2020 7 (L) 8 - 16 mmol/L Final   01/27/2013 13 5 - 15 meq/L Final     eGFR if    Date Value Ref Range Status   07/01/2020 93 > OR = 60 mL/min/1.73m2 Final     eGFR if non    Date Value Ref Range Status   07/01/2020 80 > OR = 60 mL/min/1.73m2 Final     No results found for: CEA  Lab Results   Component Value Date    PSA 1.0 01/26/2016    PSA 0.57 05/14/2010    PSA 0.8 06/20/2008           Assessment/Plan:   History of pulmonary embolus (PE)  Patient is doing well and remains on Xarelto 15mg.  Will continue therapy and patient is tolerating well.  Will continue to see him on a six month basis.      Coronary artery disease  Patient had three more stents placed in Feb 2020.  He is doing well and has no symptoms currently.  Continues on Xarelto.      Macrocytosis without anemia  Will continue to follow.  CBC not done this visit.  Will arrange for next visit in six months.     Discussion:     Follow up in about 6 months (around 1/21/2021).      Electronically signed by Silvestre Albert

## 2020-07-21 NOTE — ASSESSMENT & PLAN NOTE
Patient is doing well and remains on Xarelto 15mg.  Will continue therapy and patient is tolerating well.  Will continue to see him on a six month basis.

## 2020-08-03 DIAGNOSIS — M48.061 SPINAL STENOSIS, LUMBAR REGION, WITHOUT NEUROGENIC CLAUDICATION: Primary | ICD-10-CM

## 2020-08-05 ENCOUNTER — HOSPITAL ENCOUNTER (OUTPATIENT)
Dept: RADIOLOGY | Facility: HOSPITAL | Age: 70
Discharge: HOME | End: 2020-08-05
Attending: ANESTHESIOLOGY
Payer: COMMERCIAL

## 2020-08-05 DIAGNOSIS — M48.061 SPINAL STENOSIS, LUMBAR REGION, WITHOUT NEUROGENIC CLAUDICATION: ICD-10-CM

## 2020-08-05 PROCEDURE — 72148 MRI LUMBAR SPINE W/O DYE: CPT | Mod: TC,PO

## 2020-09-08 ENCOUNTER — TELEPHONE (OUTPATIENT)
Dept: CARDIOLOGY | Facility: CLINIC | Age: 70
End: 2020-09-08

## 2020-12-10 ENCOUNTER — OFFICE VISIT (OUTPATIENT)
Dept: CARDIOLOGY | Facility: CLINIC | Age: 70
End: 2020-12-10
Payer: COMMERCIAL

## 2020-12-10 VITALS
OXYGEN SATURATION: 98 % | BODY MASS INDEX: 35.78 KG/M2 | HEART RATE: 60 BPM | SYSTOLIC BLOOD PRESSURE: 130 MMHG | HEIGHT: 73 IN | RESPIRATION RATE: 18 BRPM | WEIGHT: 270 LBS | DIASTOLIC BLOOD PRESSURE: 70 MMHG

## 2020-12-10 DIAGNOSIS — E78.2 MIXED HYPERLIPIDEMIA: ICD-10-CM

## 2020-12-10 DIAGNOSIS — I25.10 CORONARY ARTERY DISEASE INVOLVING NATIVE CORONARY ARTERY OF NATIVE HEART WITHOUT ANGINA PECTORIS: Primary | Chronic | ICD-10-CM

## 2020-12-10 DIAGNOSIS — Z86.711 HISTORY OF PULMONARY EMBOLUS (PE): Chronic | ICD-10-CM

## 2020-12-10 DIAGNOSIS — N04.9 NEPHROTIC SYNDROME: ICD-10-CM

## 2020-12-10 PROCEDURE — 3078F DIAST BP <80 MM HG: CPT | Mod: CPTII,S$GLB,, | Performed by: INTERNAL MEDICINE

## 2020-12-10 PROCEDURE — 3075F SYST BP GE 130 - 139MM HG: CPT | Mod: CPTII,S$GLB,, | Performed by: INTERNAL MEDICINE

## 2020-12-10 PROCEDURE — 3288F FALL RISK ASSESSMENT DOCD: CPT | Mod: CPTII,S$GLB,, | Performed by: INTERNAL MEDICINE

## 2020-12-10 PROCEDURE — 3288F PR FALLS RISK ASSESSMENT DOCUMENTED: ICD-10-PCS | Mod: CPTII,S$GLB,, | Performed by: INTERNAL MEDICINE

## 2020-12-10 PROCEDURE — 1101F PT FALLS ASSESS-DOCD LE1/YR: CPT | Mod: CPTII,S$GLB,, | Performed by: INTERNAL MEDICINE

## 2020-12-10 PROCEDURE — 3075F PR MOST RECENT SYSTOLIC BLOOD PRESS GE 130-139MM HG: ICD-10-PCS | Mod: CPTII,S$GLB,, | Performed by: INTERNAL MEDICINE

## 2020-12-10 PROCEDURE — 99213 OFFICE O/P EST LOW 20 MIN: CPT | Mod: S$GLB,,, | Performed by: INTERNAL MEDICINE

## 2020-12-10 PROCEDURE — 1101F PR PT FALLS ASSESS DOC 0-1 FALLS W/OUT INJ PAST YR: ICD-10-PCS | Mod: CPTII,S$GLB,, | Performed by: INTERNAL MEDICINE

## 2020-12-10 PROCEDURE — 3008F PR BODY MASS INDEX (BMI) DOCUMENTED: ICD-10-PCS | Mod: CPTII,S$GLB,, | Performed by: INTERNAL MEDICINE

## 2020-12-10 PROCEDURE — 3008F BODY MASS INDEX DOCD: CPT | Mod: CPTII,S$GLB,, | Performed by: INTERNAL MEDICINE

## 2020-12-10 PROCEDURE — 1159F PR MEDICATION LIST DOCUMENTED IN MEDICAL RECORD: ICD-10-PCS | Mod: S$GLB,,, | Performed by: INTERNAL MEDICINE

## 2020-12-10 PROCEDURE — 99213 PR OFFICE/OUTPT VISIT, EST, LEVL III, 20-29 MIN: ICD-10-PCS | Mod: S$GLB,,, | Performed by: INTERNAL MEDICINE

## 2020-12-10 PROCEDURE — 3078F PR MOST RECENT DIASTOLIC BLOOD PRESSURE < 80 MM HG: ICD-10-PCS | Mod: CPTII,S$GLB,, | Performed by: INTERNAL MEDICINE

## 2020-12-10 PROCEDURE — 1159F MED LIST DOCD IN RCRD: CPT | Mod: S$GLB,,, | Performed by: INTERNAL MEDICINE

## 2020-12-11 NOTE — PROGRESS NOTES
Subjective:    Patient ID:  Matt Rae is a 70 y.o. male who presents for   Follow-up (no labs, no test, meds utd and reviewed,last ekg was 3/20, feels ok pt added he willl have back and shoulder surgery in the next 9m)    HPI he is here for routine follow-up visit.  He does not have any cardiac symptoms.  He rides a bicycle for exercise but this is become increasingly difficult due to his low back pain.  He tells me he needs surgery for his back and also a total right shoulder replacement.  He bruises easy but he has not had any major bleeding complications.    Review of patient's allergies indicates:   Allergen Reactions    Penicillins Hives       Past Medical History:   Diagnosis Date    Arthritis     Blood clotting tendency     Cancer     skin cancer    Coronary artery disease     MI  Stent placement 3/2005    Heart attack     Hypertension     Joint pain     Keloid cicatrix     Kidney stone     Neoplastic syndrome     Pulmonary embolism     x 2: 2005, 2015    Renal stone     Squamous cell carcinoma 02/22/2016    left dorsal hand     Wears glasses      Past Surgical History:   Procedure Laterality Date    BACK SURGERY  9/2010    L4-5 fusion decompression    BONE RESECTION, HUMERUS  1969    ORIF L radial & ulnar    CYSTOSCOPY      JOINT REPLACEMENT  2009    Partial Rt shoulder    KIDNEY STONE SURGERY      KNEE ARTHROSCOPY      LEFT HEART CATHETERIZATION Left 2/20/2020    Procedure: CATHETERIZATION, HEART, LEFT (RIGHT RADIAL);  Surgeon: Cheryl Mondragon MD;  Location: [facility] C[facility] LAB;  Service: Cardiology;  Laterality: Left;    LITHOTRIPSY      RADIOFREQUENCY ABLATION  2016    for chronic back pain    removal of colon polyp      Rt shoulder      Partial replacement 2008     Social History     Tobacco Use    Smoking status: Never Smoker    Smokeless tobacco: Never Used   Substance Use Topics    Alcohol use: Yes     Alcohol/week: 1.0 standard drinks     Types: 1 Cans of beer per  week     Comment: Socially    Drug use: No        Review of Systems     Review of Systems   Constitution: Negative for weight loss.   Cardiovascular: Negative for chest pain and irregular heartbeat.   Musculoskeletal: Positive for back pain and joint pain (Right shoulder).   Gastrointestinal: Negative.            Objective:        Vitals:    12/10/20 1201   BP: 130/70   Pulse: 60   Resp: 18       Lab Results   Component Value Date    WBC 5.82 02/27/2020    HGB 15.4 02/27/2020    HCT 46.4 02/27/2020     02/27/2020    CHOL 123 02/27/2020    TRIG 79 02/27/2020    HDL 41 02/27/2020    ALT 19 07/01/2020    AST 18 07/01/2020     07/01/2020    K 4.5 07/01/2020     07/01/2020    CREATININE 0.97 07/01/2020    BUN 19 07/01/2020    CO2 27 07/01/2020    PSA 1.0 01/26/2016    INR 1.6 02/17/2020        ECHOCARDIOGRAM RESULTS  No results found for this or any previous visit.    CURRENT/PREVIOUS VISIT EKG  Results for orders placed or performed during the hospital encounter of 02/27/20   EKG 12-LEAD on arrival to floor    Collection Time: 02/27/20 10:02 AM    Narrative    Test Reason : I25.10,    Vent. Rate : 063 BPM     Atrial Rate : 063 BPM     P-R Int : 190 ms          QRS Dur : 084 ms      QT Int : 430 ms       P-R-T Axes : 052 061 052 degrees     QTc Int : 440 ms    Normal sinus rhythm  Early repolarization  When compared with ECG of 17-FEB-2020 12:28,  No significant change was found  Confirmed by Aristides Chen MD ([id number]) on 3/3/2020 8:34:11 PM    Referred By:             Confirmed By:Aristides Chen MD     No results found for this or any previous visit.  No results found for this or any previous visit.    PHYSICAL EXAM    Physical Exam     Medication List with Changes/Refills   Current Medications    ALLOPURINOL (ZYLOPRIM) 100 MG TABLET    once daily.     ASPIRIN (ECOTRIN) 81 MG EC TABLET    Take 1 tablet (81 mg total) by mouth once daily. Stop taking 3/29/2020    B COMPLEX VITAMINS TABLET    Take 1 tablet  by mouth once daily.    CAPTOPRIL (CAPOTEN) 12.5 MG TABLET    Take 12.5 mg by mouth 2 (two) times daily.     CLOPIDOGREL (PLAVIX) 75 MG TABLET    Take 1 tablet (75 mg total) by mouth once daily.    MAGNESIUM OXIDE (MAG-OX) 400 MG (241.3 MG MAGNESIUM) TABLET    Take 250 mg by mouth once daily.    METHOCARBAMOL (ROBAXIN) 750 MG TAB    Take 750 mg by mouth as needed.     METOPROLOL SUCCINATE (TOPROL-XL) 25 MG 24 HR TABLET    Take 25 mg by mouth once daily.     MULTIVITAMIN CAPSULE    Take 1 capsule by mouth once daily.      SIMVASTATIN (ZOCOR) 20 MG TABLET    Take 10 mg by mouth every evening.     TADALAFIL (CIALIS) 20 MG TAB    Take 20 mg by mouth once daily.     TRAMADOL (ULTRAM) 50 MG TABLET    Take 50 mg by mouth once daily.     XARELTO 15 MG TAB    15 mg once daily.            Assessment:       1. Coronary artery disease involving native coronary artery of native heart without angina pectoris    2. History of pulmonary embolus (PE)    3. Mixed hyperlipidemia    4. Nephrotic syndrome    5.      Obesity class 2     Plan:  February this year will be 1 year from the time of LAD stenting I told him and the time we can completely stop Plavix.  He should be able to have 1st back operation and then shoulder surgery.  He tells me he also is due for routine colonoscopy.  I asked him to stop Plavix after mid February of 2021.  Will see him for follow-up in February to clear him for surgery       Problem List Items Addressed This Visit        Cardiac/Vascular    Coronary artery disease involving native coronary artery of native heart without angina pectoris - Primary    Relevant Orders    Comprehensive Metabolic Panel       Hematology    History of pulmonary embolus (PE) (Chronic)    Relevant Orders    CBC Without Differential      Other Visit Diagnoses     Mixed hyperlipidemia        Relevant Orders    Lipid Panel    Nephrotic syndrome               Follow up in about 10 weeks (around 2/18/2021).

## 2021-01-12 LAB
ALBUMIN SERPL-MCNC: 4.2 G/DL (ref 3.6–5.1)
ALBUMIN/GLOB SERPL: 1.4 (CALC) (ref 1–2.5)
ALP SERPL-CCNC: 68 U/L (ref 35–144)
ALT SERPL-CCNC: 25 U/L (ref 9–46)
AST SERPL-CCNC: 23 U/L (ref 10–35)
BASOPHILS # BLD AUTO: 30 CELLS/UL (ref 0–200)
BASOPHILS NFR BLD AUTO: 0.6 %
BILIRUB SERPL-MCNC: 0.7 MG/DL (ref 0.2–1.2)
BUN SERPL-MCNC: 13 MG/DL (ref 7–25)
BUN/CREAT SERPL: ABNORMAL (CALC) (ref 6–22)
CALCIUM SERPL-MCNC: 9.7 MG/DL (ref 8.6–10.3)
CHLORIDE SERPL-SCNC: 104 MMOL/L (ref 98–110)
CO2 SERPL-SCNC: 28 MMOL/L (ref 20–32)
CREAT SERPL-MCNC: 0.92 MG/DL (ref 0.7–1.25)
EOSINOPHIL # BLD AUTO: 70 CELLS/UL (ref 15–500)
EOSINOPHIL NFR BLD AUTO: 1.4 %
ERYTHROCYTE [DISTWIDTH] IN BLOOD BY AUTOMATED COUNT: 12.9 % (ref 11–15)
GFRSERPLBLD MDRD-ARVRAT: 85 ML/MIN/1.73M2
GLOBULIN SER CALC-MCNC: 3 G/DL (CALC) (ref 1.9–3.7)
GLUCOSE SERPL-MCNC: 111 MG/DL (ref 65–99)
HCT VFR BLD AUTO: 47.1 % (ref 38.5–50)
HGB BLD-MCNC: 15.7 G/DL (ref 13.2–17.1)
LYMPHOCYTES # BLD AUTO: 1670 CELLS/UL (ref 850–3900)
LYMPHOCYTES NFR BLD AUTO: 33.4 %
MCH RBC QN AUTO: 33.7 PG (ref 27–33)
MCHC RBC AUTO-ENTMCNC: 33.3 G/DL (ref 32–36)
MCV RBC AUTO: 101.1 FL (ref 80–100)
MONOCYTES # BLD AUTO: 490 CELLS/UL (ref 200–950)
MONOCYTES NFR BLD AUTO: 9.8 %
NEUTROPHILS # BLD AUTO: 2740 CELLS/UL (ref 1500–7800)
NEUTROPHILS NFR BLD AUTO: 54.8 %
PLATELET # BLD AUTO: 177 THOUSAND/UL (ref 140–400)
PMV BLD REES-ECKER: 11.4 FL (ref 7.5–12.5)
POTASSIUM SERPL-SCNC: 4.5 MMOL/L (ref 3.5–5.3)
PROT SERPL-MCNC: 7.2 G/DL (ref 6.1–8.1)
RBC # BLD AUTO: 4.66 MILLION/UL (ref 4.2–5.8)
SODIUM SERPL-SCNC: 139 MMOL/L (ref 135–146)
WBC # BLD AUTO: 5 THOUSAND/UL (ref 3.8–10.8)

## 2021-01-20 ENCOUNTER — OFFICE VISIT (OUTPATIENT)
Dept: HEMATOLOGY/ONCOLOGY | Facility: CLINIC | Age: 71
End: 2021-01-20
Payer: COMMERCIAL

## 2021-01-20 VITALS
BODY MASS INDEX: 35.53 KG/M2 | SYSTOLIC BLOOD PRESSURE: 137 MMHG | WEIGHT: 269.31 LBS | TEMPERATURE: 98 F | HEART RATE: 65 BPM | DIASTOLIC BLOOD PRESSURE: 81 MMHG | RESPIRATION RATE: 17 BRPM

## 2021-01-20 DIAGNOSIS — I25.118 CORONARY ARTERY DISEASE WITH OTHER FORM OF ANGINA PECTORIS, UNSPECIFIED VESSEL OR LESION TYPE, UNSPECIFIED WHETHER NATIVE OR TRANSPLANTED HEART: ICD-10-CM

## 2021-01-20 DIAGNOSIS — D68.61 ANTICARDIOLIPIN SYNDROME: Chronic | ICD-10-CM

## 2021-01-20 DIAGNOSIS — M51.9 LUMBAR DISC DISEASE: ICD-10-CM

## 2021-01-20 PROCEDURE — 3288F PR FALLS RISK ASSESSMENT DOCUMENTED: ICD-10-PCS | Mod: S$GLB,,, | Performed by: INTERNAL MEDICINE

## 2021-01-20 PROCEDURE — 1125F AMNT PAIN NOTED PAIN PRSNT: CPT | Mod: S$GLB,,, | Performed by: INTERNAL MEDICINE

## 2021-01-20 PROCEDURE — 3288F FALL RISK ASSESSMENT DOCD: CPT | Mod: S$GLB,,, | Performed by: INTERNAL MEDICINE

## 2021-01-20 PROCEDURE — 1125F PR PAIN SEVERITY QUANTIFIED, PAIN PRESENT: ICD-10-PCS | Mod: S$GLB,,, | Performed by: INTERNAL MEDICINE

## 2021-01-20 PROCEDURE — 3008F BODY MASS INDEX DOCD: CPT | Mod: S$GLB,,, | Performed by: INTERNAL MEDICINE

## 2021-01-20 PROCEDURE — 1101F PR PT FALLS ASSESS DOC 0-1 FALLS W/OUT INJ PAST YR: ICD-10-PCS | Mod: S$GLB,,, | Performed by: INTERNAL MEDICINE

## 2021-01-20 PROCEDURE — 1159F MED LIST DOCD IN RCRD: CPT | Mod: S$GLB,,, | Performed by: INTERNAL MEDICINE

## 2021-01-20 PROCEDURE — 3075F PR MOST RECENT SYSTOLIC BLOOD PRESS GE 130-139MM HG: ICD-10-PCS | Mod: S$GLB,,, | Performed by: INTERNAL MEDICINE

## 2021-01-20 PROCEDURE — 1101F PT FALLS ASSESS-DOCD LE1/YR: CPT | Mod: S$GLB,,, | Performed by: INTERNAL MEDICINE

## 2021-01-20 PROCEDURE — 99214 OFFICE O/P EST MOD 30 MIN: CPT | Mod: S$GLB,,, | Performed by: INTERNAL MEDICINE

## 2021-01-20 PROCEDURE — 3079F DIAST BP 80-89 MM HG: CPT | Mod: S$GLB,,, | Performed by: INTERNAL MEDICINE

## 2021-01-20 PROCEDURE — 3079F PR MOST RECENT DIASTOLIC BLOOD PRESSURE 80-89 MM HG: ICD-10-PCS | Mod: S$GLB,,, | Performed by: INTERNAL MEDICINE

## 2021-01-20 PROCEDURE — 1159F PR MEDICATION LIST DOCUMENTED IN MEDICAL RECORD: ICD-10-PCS | Mod: S$GLB,,, | Performed by: INTERNAL MEDICINE

## 2021-01-20 PROCEDURE — 3075F SYST BP GE 130 - 139MM HG: CPT | Mod: S$GLB,,, | Performed by: INTERNAL MEDICINE

## 2021-01-20 PROCEDURE — 3008F PR BODY MASS INDEX (BMI) DOCUMENTED: ICD-10-PCS | Mod: S$GLB,,, | Performed by: INTERNAL MEDICINE

## 2021-01-20 PROCEDURE — 99214 PR OFFICE/OUTPT VISIT, EST, LEVL IV, 30-39 MIN: ICD-10-PCS | Mod: S$GLB,,, | Performed by: INTERNAL MEDICINE

## 2021-02-04 ENCOUNTER — PATIENT MESSAGE (OUTPATIENT)
Dept: UROLOGY | Facility: CLINIC | Age: 71
End: 2021-02-04

## 2021-02-04 DIAGNOSIS — N40.0 BENIGN PROSTATIC HYPERPLASIA WITHOUT LOWER URINARY TRACT SYMPTOMS: Primary | ICD-10-CM

## 2021-02-04 DIAGNOSIS — N40.1 BENIGN PROSTATIC HYPERPLASIA WITH URINARY OBSTRUCTION: ICD-10-CM

## 2021-02-04 DIAGNOSIS — N13.8 BENIGN PROSTATIC HYPERPLASIA WITH URINARY OBSTRUCTION: ICD-10-CM

## 2021-02-04 DIAGNOSIS — R35.1 NOCTURIA: ICD-10-CM

## 2021-02-08 ENCOUNTER — LAB VISIT (OUTPATIENT)
Dept: LAB | Facility: HOSPITAL | Age: 71
End: 2021-02-08
Attending: UROLOGY
Payer: COMMERCIAL

## 2021-02-08 DIAGNOSIS — N13.8 BENIGN PROSTATIC HYPERPLASIA WITH URINARY OBSTRUCTION: ICD-10-CM

## 2021-02-08 DIAGNOSIS — R35.1 NOCTURIA: ICD-10-CM

## 2021-02-08 DIAGNOSIS — N40.1 BENIGN PROSTATIC HYPERPLASIA WITH URINARY OBSTRUCTION: ICD-10-CM

## 2021-02-08 LAB — COMPLEXED PSA SERPL-MCNC: 1.1 NG/ML (ref 0–4)

## 2021-02-08 PROCEDURE — 84153 ASSAY OF PSA TOTAL: CPT

## 2021-02-08 PROCEDURE — 36415 COLL VENOUS BLD VENIPUNCTURE: CPT

## 2021-02-15 ENCOUNTER — PATIENT MESSAGE (OUTPATIENT)
Dept: ADMINISTRATIVE | Facility: OTHER | Age: 71
End: 2021-02-15

## 2021-02-17 ENCOUNTER — OFFICE VISIT (OUTPATIENT)
Dept: UROLOGY | Facility: CLINIC | Age: 71
End: 2021-02-17
Payer: COMMERCIAL

## 2021-02-17 VITALS
BODY MASS INDEX: 35.03 KG/M2 | HEIGHT: 73 IN | DIASTOLIC BLOOD PRESSURE: 83 MMHG | WEIGHT: 264.31 LBS | HEART RATE: 63 BPM | SYSTOLIC BLOOD PRESSURE: 146 MMHG

## 2021-02-17 DIAGNOSIS — N20.0 NEPHROLITHIASIS: ICD-10-CM

## 2021-02-17 DIAGNOSIS — N40.0 BENIGN PROSTATIC HYPERPLASIA WITHOUT LOWER URINARY TRACT SYMPTOMS: Primary | ICD-10-CM

## 2021-02-17 LAB
BILIRUB SERPL-MCNC: NORMAL MG/DL
BLOOD URINE, POC: NORMAL
CLARITY, POC UA: CLEAR
COLOR, POC UA: YELLOW
GLUCOSE UR QL STRIP: NORMAL
KETONES UR QL STRIP: NORMAL
LEUKOCYTE ESTERASE URINE, POC: NORMAL
NITRITE, POC UA: NORMAL
PH, POC UA: 5.5
PROTEIN, POC: NORMAL
SPECIFIC GRAVITY, POC UA: 1.02
UROBILINOGEN, POC UA: NORMAL

## 2021-02-17 PROCEDURE — 81002 URINALYSIS NONAUTO W/O SCOPE: CPT | Mod: S$GLB,,, | Performed by: UROLOGY

## 2021-02-17 PROCEDURE — 3077F SYST BP >= 140 MM HG: CPT | Mod: CPTII,S$GLB,, | Performed by: UROLOGY

## 2021-02-17 PROCEDURE — 1159F PR MEDICATION LIST DOCUMENTED IN MEDICAL RECORD: ICD-10-PCS | Mod: S$GLB,,, | Performed by: UROLOGY

## 2021-02-17 PROCEDURE — 81002 POCT URINE DIPSTICK WITHOUT MICROSCOPE: ICD-10-PCS | Mod: S$GLB,,, | Performed by: UROLOGY

## 2021-02-17 PROCEDURE — 3288F PR FALLS RISK ASSESSMENT DOCUMENTED: ICD-10-PCS | Mod: CPTII,S$GLB,, | Performed by: UROLOGY

## 2021-02-17 PROCEDURE — 99999 PR PBB SHADOW E&M-EST. PATIENT-LVL IV: ICD-10-PCS | Mod: PBBFAC,,, | Performed by: UROLOGY

## 2021-02-17 PROCEDURE — 3288F FALL RISK ASSESSMENT DOCD: CPT | Mod: CPTII,S$GLB,, | Performed by: UROLOGY

## 2021-02-17 PROCEDURE — 3008F PR BODY MASS INDEX (BMI) DOCUMENTED: ICD-10-PCS | Mod: CPTII,S$GLB,, | Performed by: UROLOGY

## 2021-02-17 PROCEDURE — 3079F DIAST BP 80-89 MM HG: CPT | Mod: CPTII,S$GLB,, | Performed by: UROLOGY

## 2021-02-17 PROCEDURE — 3077F PR MOST RECENT SYSTOLIC BLOOD PRESSURE >= 140 MM HG: ICD-10-PCS | Mod: CPTII,S$GLB,, | Performed by: UROLOGY

## 2021-02-17 PROCEDURE — 1126F PR PAIN SEVERITY QUANTIFIED, NO PAIN PRESENT: ICD-10-PCS | Mod: S$GLB,,, | Performed by: UROLOGY

## 2021-02-17 PROCEDURE — 1101F PR PT FALLS ASSESS DOC 0-1 FALLS W/OUT INJ PAST YR: ICD-10-PCS | Mod: CPTII,S$GLB,, | Performed by: UROLOGY

## 2021-02-17 PROCEDURE — 99999 PR PBB SHADOW E&M-EST. PATIENT-LVL IV: CPT | Mod: PBBFAC,,, | Performed by: UROLOGY

## 2021-02-17 PROCEDURE — 1159F MED LIST DOCD IN RCRD: CPT | Mod: S$GLB,,, | Performed by: UROLOGY

## 2021-02-17 PROCEDURE — 1126F AMNT PAIN NOTED NONE PRSNT: CPT | Mod: S$GLB,,, | Performed by: UROLOGY

## 2021-02-17 PROCEDURE — 3008F BODY MASS INDEX DOCD: CPT | Mod: CPTII,S$GLB,, | Performed by: UROLOGY

## 2021-02-17 PROCEDURE — 3079F PR MOST RECENT DIASTOLIC BLOOD PRESSURE 80-89 MM HG: ICD-10-PCS | Mod: CPTII,S$GLB,, | Performed by: UROLOGY

## 2021-02-17 PROCEDURE — 1101F PT FALLS ASSESS-DOCD LE1/YR: CPT | Mod: CPTII,S$GLB,, | Performed by: UROLOGY

## 2021-02-17 PROCEDURE — 99214 OFFICE O/P EST MOD 30 MIN: CPT | Mod: 25,S$GLB,, | Performed by: UROLOGY

## 2021-02-17 PROCEDURE — 99214 PR OFFICE/OUTPT VISIT, EST, LEVL IV, 30-39 MIN: ICD-10-PCS | Mod: 25,S$GLB,, | Performed by: UROLOGY

## 2021-02-17 RX ORDER — ACETAMINOPHEN 500 MG
5000 TABLET ORAL DAILY
COMMUNITY
Start: 2020-01-01

## 2021-02-17 RX ORDER — CLINDAMYCIN HYDROCHLORIDE 150 MG/1
CAPSULE ORAL
COMMUNITY
Start: 2021-01-26 | End: 2021-02-17

## 2021-02-17 RX ORDER — ALLOPURINOL 300 MG/1
TABLET ORAL
COMMUNITY
Start: 2021-01-29

## 2021-02-17 RX ORDER — SIMVASTATIN 10 MG/1
10 TABLET, FILM COATED ORAL NIGHTLY
COMMUNITY
Start: 2020-12-30 | End: 2021-03-01 | Stop reason: SDUPTHER

## 2021-02-22 ENCOUNTER — PATIENT MESSAGE (OUTPATIENT)
Dept: CARDIOLOGY | Facility: CLINIC | Age: 71
End: 2021-02-22

## 2021-03-01 ENCOUNTER — PATIENT MESSAGE (OUTPATIENT)
Dept: CARDIOLOGY | Facility: CLINIC | Age: 71
End: 2021-03-01

## 2021-03-01 ENCOUNTER — OFFICE VISIT (OUTPATIENT)
Dept: CARDIOLOGY | Facility: CLINIC | Age: 71
End: 2021-03-01
Payer: COMMERCIAL

## 2021-03-01 VITALS
RESPIRATION RATE: 18 BRPM | DIASTOLIC BLOOD PRESSURE: 78 MMHG | HEART RATE: 60 BPM | BODY MASS INDEX: 34.72 KG/M2 | WEIGHT: 262 LBS | OXYGEN SATURATION: 98 % | HEIGHT: 73 IN | SYSTOLIC BLOOD PRESSURE: 126 MMHG

## 2021-03-01 DIAGNOSIS — M47.816 DEGENERATIVE JOINT DISEASE OF CERVICAL AND LUMBAR SPINE: ICD-10-CM

## 2021-03-01 DIAGNOSIS — Z86.711 HISTORY OF PULMONARY EMBOLUS (PE): Chronic | ICD-10-CM

## 2021-03-01 DIAGNOSIS — I25.10 CORONARY ARTERY DISEASE, ANGINA PRESENCE UNSPECIFIED, UNSPECIFIED VESSEL OR LESION TYPE, UNSPECIFIED WHETHER NATIVE OR TRANSPLANTED HEART: Primary | ICD-10-CM

## 2021-03-01 DIAGNOSIS — M47.812 DEGENERATIVE JOINT DISEASE OF CERVICAL AND LUMBAR SPINE: ICD-10-CM

## 2021-03-01 DIAGNOSIS — D68.61 ANTICARDIOLIPIN SYNDROME: Chronic | ICD-10-CM

## 2021-03-01 PROCEDURE — 3078F PR MOST RECENT DIASTOLIC BLOOD PRESSURE < 80 MM HG: ICD-10-PCS | Mod: CPTII,S$GLB,, | Performed by: SPECIALIST

## 2021-03-01 PROCEDURE — 1159F MED LIST DOCD IN RCRD: CPT | Mod: S$GLB,,, | Performed by: SPECIALIST

## 2021-03-01 PROCEDURE — 3074F SYST BP LT 130 MM HG: CPT | Mod: CPTII,S$GLB,, | Performed by: SPECIALIST

## 2021-03-01 PROCEDURE — 93000 EKG 12-LEAD: ICD-10-PCS | Mod: S$GLB,,, | Performed by: INTERNAL MEDICINE

## 2021-03-01 PROCEDURE — 1101F PT FALLS ASSESS-DOCD LE1/YR: CPT | Mod: CPTII,S$GLB,, | Performed by: SPECIALIST

## 2021-03-01 PROCEDURE — 3008F PR BODY MASS INDEX (BMI) DOCUMENTED: ICD-10-PCS | Mod: CPTII,S$GLB,, | Performed by: SPECIALIST

## 2021-03-01 PROCEDURE — 1159F PR MEDICATION LIST DOCUMENTED IN MEDICAL RECORD: ICD-10-PCS | Mod: S$GLB,,, | Performed by: SPECIALIST

## 2021-03-01 PROCEDURE — 3074F PR MOST RECENT SYSTOLIC BLOOD PRESSURE < 130 MM HG: ICD-10-PCS | Mod: CPTII,S$GLB,, | Performed by: SPECIALIST

## 2021-03-01 PROCEDURE — 99215 PR OFFICE/OUTPT VISIT, EST, LEVL V, 40-54 MIN: ICD-10-PCS | Mod: S$GLB,,, | Performed by: SPECIALIST

## 2021-03-01 PROCEDURE — 99215 OFFICE O/P EST HI 40 MIN: CPT | Mod: S$GLB,,, | Performed by: SPECIALIST

## 2021-03-01 PROCEDURE — 3288F FALL RISK ASSESSMENT DOCD: CPT | Mod: CPTII,S$GLB,, | Performed by: SPECIALIST

## 2021-03-01 PROCEDURE — 3288F PR FALLS RISK ASSESSMENT DOCUMENTED: ICD-10-PCS | Mod: CPTII,S$GLB,, | Performed by: SPECIALIST

## 2021-03-01 PROCEDURE — 1101F PR PT FALLS ASSESS DOC 0-1 FALLS W/OUT INJ PAST YR: ICD-10-PCS | Mod: CPTII,S$GLB,, | Performed by: SPECIALIST

## 2021-03-01 PROCEDURE — 93000 ELECTROCARDIOGRAM COMPLETE: CPT | Mod: S$GLB,,, | Performed by: INTERNAL MEDICINE

## 2021-03-01 PROCEDURE — 3078F DIAST BP <80 MM HG: CPT | Mod: CPTII,S$GLB,, | Performed by: SPECIALIST

## 2021-03-01 PROCEDURE — 3008F BODY MASS INDEX DOCD: CPT | Mod: CPTII,S$GLB,, | Performed by: SPECIALIST

## 2021-03-01 RX ORDER — RIVAROXABAN 15 MG/1
15 TABLET, FILM COATED ORAL DAILY
Qty: 90 TABLET | Refills: 4 | Status: SHIPPED | OUTPATIENT
Start: 2021-03-01 | End: 2021-06-01 | Stop reason: SDUPTHER

## 2021-03-01 RX ORDER — SIMVASTATIN 10 MG/1
10 TABLET, FILM COATED ORAL NIGHTLY
Qty: 120 TABLET | Refills: 3 | Status: SHIPPED | OUTPATIENT
Start: 2021-03-01 | End: 2021-11-30 | Stop reason: SDUPTHER

## 2021-03-01 RX ORDER — CAPTOPRIL 12.5 MG/1
12.5 TABLET ORAL 2 TIMES DAILY
Qty: 120 TABLET | Refills: 3 | Status: SHIPPED | OUTPATIENT
Start: 2021-03-01 | End: 2021-06-22 | Stop reason: SDUPTHER

## 2021-03-01 RX ORDER — CLOPIDOGREL BISULFATE 75 MG/1
75 TABLET ORAL DAILY
Qty: 90 TABLET | Refills: 3 | Status: SHIPPED | OUTPATIENT
Start: 2021-03-01 | End: 2021-11-30

## 2021-03-01 RX ORDER — ENOXAPARIN SODIUM 100 MG/ML
80 INJECTION SUBCUTANEOUS
Status: SHIPPED | OUTPATIENT
Start: 2021-03-01 | End: 2021-03-03

## 2021-03-01 RX ORDER — METOPROLOL SUCCINATE 25 MG/1
25 TABLET, EXTENDED RELEASE ORAL DAILY
Qty: 90 TABLET | Refills: 3 | Status: SHIPPED | OUTPATIENT
Start: 2021-03-01 | End: 2021-11-30 | Stop reason: SDUPTHER

## 2021-03-02 ENCOUNTER — PATIENT MESSAGE (OUTPATIENT)
Dept: CARDIOLOGY | Facility: CLINIC | Age: 71
End: 2021-03-02

## 2021-03-03 ENCOUNTER — TELEPHONE (OUTPATIENT)
Dept: CARDIOLOGY | Facility: CLINIC | Age: 71
End: 2021-03-03

## 2021-03-30 ENCOUNTER — PATIENT MESSAGE (OUTPATIENT)
Dept: HEMATOLOGY/ONCOLOGY | Facility: CLINIC | Age: 71
End: 2021-03-30

## 2021-04-06 DIAGNOSIS — D68.61 ANTICARDIOLIPIN SYNDROME: Primary | ICD-10-CM

## 2021-04-06 RX ORDER — ENOXAPARIN SODIUM 100 MG/ML
40 INJECTION SUBCUTANEOUS DAILY
Qty: 10 SYRINGE | Refills: 1 | Status: SHIPPED | OUTPATIENT
Start: 2021-04-06 | End: 2021-06-22 | Stop reason: ALTCHOICE

## 2021-04-06 RX ORDER — ENOXAPARIN SODIUM 100 MG/ML
40 INJECTION SUBCUTANEOUS DAILY
COMMUNITY
End: 2021-04-06 | Stop reason: SDUPTHER

## 2021-05-06 ENCOUNTER — PATIENT MESSAGE (OUTPATIENT)
Dept: RESEARCH | Facility: HOSPITAL | Age: 71
End: 2021-05-06

## 2021-05-18 ENCOUNTER — PATIENT MESSAGE (OUTPATIENT)
Dept: CARDIOLOGY | Facility: CLINIC | Age: 71
End: 2021-05-18

## 2021-06-01 ENCOUNTER — PATIENT MESSAGE (OUTPATIENT)
Dept: CARDIOLOGY | Facility: CLINIC | Age: 71
End: 2021-06-01

## 2021-06-15 ENCOUNTER — PATIENT MESSAGE (OUTPATIENT)
Dept: UROLOGY | Facility: CLINIC | Age: 71
End: 2021-06-15

## 2021-06-15 ENCOUNTER — TELEPHONE (OUTPATIENT)
Dept: HEMATOLOGY/ONCOLOGY | Facility: CLINIC | Age: 71
End: 2021-06-15

## 2021-06-16 ENCOUNTER — PATIENT MESSAGE (OUTPATIENT)
Dept: UROLOGY | Facility: CLINIC | Age: 71
End: 2021-06-16

## 2021-06-22 ENCOUNTER — OFFICE VISIT (OUTPATIENT)
Dept: CARDIOLOGY | Facility: CLINIC | Age: 71
End: 2021-06-22
Payer: COMMERCIAL

## 2021-06-22 VITALS
DIASTOLIC BLOOD PRESSURE: 80 MMHG | HEART RATE: 68 BPM | SYSTOLIC BLOOD PRESSURE: 130 MMHG | HEIGHT: 73 IN | OXYGEN SATURATION: 97 % | BODY MASS INDEX: 34.59 KG/M2 | WEIGHT: 261 LBS

## 2021-06-22 DIAGNOSIS — I25.118 CORONARY ARTERY DISEASE WITH OTHER FORM OF ANGINA PECTORIS, UNSPECIFIED VESSEL OR LESION TYPE, UNSPECIFIED WHETHER NATIVE OR TRANSPLANTED HEART: Primary | ICD-10-CM

## 2021-06-22 DIAGNOSIS — D68.61 ANTICARDIOLIPIN SYNDROME: Chronic | ICD-10-CM

## 2021-06-22 DIAGNOSIS — M54.9 BACK PAIN WITH HISTORY OF SPINAL SURGERY: ICD-10-CM

## 2021-06-22 DIAGNOSIS — Z98.890 BACK PAIN WITH HISTORY OF SPINAL SURGERY: ICD-10-CM

## 2021-06-22 DIAGNOSIS — Z86.711 HISTORY OF PULMONARY EMBOLUS (PE): Chronic | ICD-10-CM

## 2021-06-22 PROCEDURE — 3008F PR BODY MASS INDEX (BMI) DOCUMENTED: ICD-10-PCS | Mod: CPTII,S$GLB,, | Performed by: SPECIALIST

## 2021-06-22 PROCEDURE — 3288F FALL RISK ASSESSMENT DOCD: CPT | Mod: CPTII,S$GLB,, | Performed by: SPECIALIST

## 2021-06-22 PROCEDURE — 3288F PR FALLS RISK ASSESSMENT DOCUMENTED: ICD-10-PCS | Mod: CPTII,S$GLB,, | Performed by: SPECIALIST

## 2021-06-22 PROCEDURE — 3008F BODY MASS INDEX DOCD: CPT | Mod: CPTII,S$GLB,, | Performed by: SPECIALIST

## 2021-06-22 PROCEDURE — 1101F PT FALLS ASSESS-DOCD LE1/YR: CPT | Mod: CPTII,S$GLB,, | Performed by: SPECIALIST

## 2021-06-22 PROCEDURE — 1159F MED LIST DOCD IN RCRD: CPT | Mod: S$GLB,,, | Performed by: SPECIALIST

## 2021-06-22 PROCEDURE — 99214 PR OFFICE/OUTPT VISIT, EST, LEVL IV, 30-39 MIN: ICD-10-PCS | Mod: S$GLB,,, | Performed by: SPECIALIST

## 2021-06-22 PROCEDURE — 99214 OFFICE O/P EST MOD 30 MIN: CPT | Mod: S$GLB,,, | Performed by: SPECIALIST

## 2021-06-22 PROCEDURE — 1159F PR MEDICATION LIST DOCUMENTED IN MEDICAL RECORD: ICD-10-PCS | Mod: S$GLB,,, | Performed by: SPECIALIST

## 2021-06-22 PROCEDURE — 1101F PR PT FALLS ASSESS DOC 0-1 FALLS W/OUT INJ PAST YR: ICD-10-PCS | Mod: CPTII,S$GLB,, | Performed by: SPECIALIST

## 2021-06-22 RX ORDER — CAPTOPRIL 12.5 MG/1
12.5 TABLET ORAL 2 TIMES DAILY
Qty: 120 TABLET | Refills: 3 | Status: SHIPPED | OUTPATIENT
Start: 2021-06-22 | End: 2021-06-23 | Stop reason: SDUPTHER

## 2021-06-22 RX ORDER — OXYCODONE AND ACETAMINOPHEN 10; 325 MG/1; MG/1
1 TABLET ORAL EVERY 4 HOURS PRN
COMMUNITY
Start: 2021-05-29 | End: 2021-11-30

## 2021-06-23 ENCOUNTER — PATIENT MESSAGE (OUTPATIENT)
Dept: CARDIOLOGY | Facility: CLINIC | Age: 71
End: 2021-06-23

## 2021-06-23 RX ORDER — CAPTOPRIL 12.5 MG/1
12.5 TABLET ORAL 2 TIMES DAILY
Qty: 180 TABLET | Refills: 3 | Status: SHIPPED | OUTPATIENT
Start: 2021-06-23 | End: 2022-06-23

## 2021-07-02 LAB
ALBUMIN SERPL-MCNC: 4 G/DL (ref 3.6–5.1)
ALBUMIN/GLOB SERPL: 1.5 (CALC) (ref 1–2.5)
ALP SERPL-CCNC: 79 U/L (ref 35–144)
ALT SERPL-CCNC: 10 U/L (ref 9–46)
AST SERPL-CCNC: 15 U/L (ref 10–35)
BASOPHILS # BLD AUTO: 39 CELLS/UL (ref 0–200)
BASOPHILS NFR BLD AUTO: 0.6 %
BILIRUB SERPL-MCNC: 0.5 MG/DL (ref 0.2–1.2)
BUN SERPL-MCNC: 13 MG/DL (ref 7–25)
BUN/CREAT SERPL: NORMAL (CALC) (ref 6–22)
CALCIUM SERPL-MCNC: 9.3 MG/DL (ref 8.6–10.3)
CHLORIDE SERPL-SCNC: 104 MMOL/L (ref 98–110)
CO2 SERPL-SCNC: 28 MMOL/L (ref 20–32)
CREAT SERPL-MCNC: 0.79 MG/DL (ref 0.7–1.25)
EOSINOPHIL # BLD AUTO: 143 CELLS/UL (ref 15–500)
EOSINOPHIL NFR BLD AUTO: 2.2 %
ERYTHROCYTE [DISTWIDTH] IN BLOOD BY AUTOMATED COUNT: 13.8 % (ref 11–15)
GLOBULIN SER CALC-MCNC: 2.7 G/DL (CALC) (ref 1.9–3.7)
GLUCOSE SERPL-MCNC: 97 MG/DL (ref 65–99)
HCT VFR BLD AUTO: 37 % (ref 38.5–50)
HGB BLD-MCNC: 12.8 G/DL (ref 13.2–17.1)
LYMPHOCYTES # BLD AUTO: 1508 CELLS/UL (ref 850–3900)
LYMPHOCYTES NFR BLD AUTO: 23.2 %
MCH RBC QN AUTO: 34.6 PG (ref 27–33)
MCHC RBC AUTO-ENTMCNC: 34.6 G/DL (ref 32–36)
MCV RBC AUTO: 100 FL (ref 80–100)
MONOCYTES # BLD AUTO: 488 CELLS/UL (ref 200–950)
MONOCYTES NFR BLD AUTO: 7.5 %
NEUTROPHILS # BLD AUTO: 4323 CELLS/UL (ref 1500–7800)
NEUTROPHILS NFR BLD AUTO: 66.5 %
PLATELET # BLD AUTO: 166 THOUSAND/UL (ref 140–400)
PMV BLD REES-ECKER: 11.4 FL (ref 7.5–12.5)
POTASSIUM SERPL-SCNC: 4.5 MMOL/L (ref 3.5–5.3)
PROT SERPL-MCNC: 6.7 G/DL (ref 6.1–8.1)
RBC # BLD AUTO: 3.7 MILLION/UL (ref 4.2–5.8)
SODIUM SERPL-SCNC: 140 MMOL/L (ref 135–146)
WBC # BLD AUTO: 6.5 THOUSAND/UL (ref 3.8–10.8)

## 2021-07-28 ENCOUNTER — OFFICE VISIT (OUTPATIENT)
Dept: HEMATOLOGY/ONCOLOGY | Facility: CLINIC | Age: 71
End: 2021-07-28
Payer: COMMERCIAL

## 2021-07-28 VITALS
BODY MASS INDEX: 34.85 KG/M2 | WEIGHT: 263 LBS | SYSTOLIC BLOOD PRESSURE: 150 MMHG | HEIGHT: 73 IN | HEART RATE: 66 BPM | RESPIRATION RATE: 18 BRPM | DIASTOLIC BLOOD PRESSURE: 80 MMHG

## 2021-07-28 DIAGNOSIS — Z95.5 S/P CORONARY ARTERY STENT PLACEMENT: ICD-10-CM

## 2021-07-28 DIAGNOSIS — D75.89 MACROCYTOSIS WITHOUT ANEMIA: Chronic | ICD-10-CM

## 2021-07-28 DIAGNOSIS — D68.61 ANTICARDIOLIPIN SYNDROME: Chronic | ICD-10-CM

## 2021-07-28 PROCEDURE — 1125F AMNT PAIN NOTED PAIN PRSNT: CPT | Mod: S$GLB,,, | Performed by: INTERNAL MEDICINE

## 2021-07-28 PROCEDURE — 99214 PR OFFICE/OUTPT VISIT, EST, LEVL IV, 30-39 MIN: ICD-10-PCS | Mod: S$GLB,,, | Performed by: INTERNAL MEDICINE

## 2021-07-28 PROCEDURE — 3008F PR BODY MASS INDEX (BMI) DOCUMENTED: ICD-10-PCS | Mod: S$GLB,,, | Performed by: INTERNAL MEDICINE

## 2021-07-28 PROCEDURE — 99214 OFFICE O/P EST MOD 30 MIN: CPT | Mod: S$GLB,,, | Performed by: INTERNAL MEDICINE

## 2021-07-28 PROCEDURE — 1125F PR PAIN SEVERITY QUANTIFIED, PAIN PRESENT: ICD-10-PCS | Mod: S$GLB,,, | Performed by: INTERNAL MEDICINE

## 2021-07-28 PROCEDURE — 3008F BODY MASS INDEX DOCD: CPT | Mod: S$GLB,,, | Performed by: INTERNAL MEDICINE

## 2022-01-12 LAB
ALBUMIN SERPL-MCNC: 4.2 G/DL (ref 3.6–5.1)
ALBUMIN/GLOB SERPL: 1.4 (CALC) (ref 1–2.5)
ALP SERPL-CCNC: 74 U/L (ref 35–144)
ALT SERPL-CCNC: 21 U/L (ref 9–46)
AST SERPL-CCNC: 19 U/L (ref 10–35)
BASOPHILS # BLD AUTO: 32 CELLS/UL (ref 0–200)
BASOPHILS NFR BLD AUTO: 0.6 %
BILIRUB SERPL-MCNC: 0.5 MG/DL (ref 0.2–1.2)
BUN SERPL-MCNC: 17 MG/DL (ref 7–25)
BUN/CREAT SERPL: ABNORMAL (CALC) (ref 6–22)
CALCIUM SERPL-MCNC: 9.3 MG/DL (ref 8.6–10.3)
CHLORIDE SERPL-SCNC: 105 MMOL/L (ref 98–110)
CO2 SERPL-SCNC: 28 MMOL/L (ref 20–32)
CREAT SERPL-MCNC: 0.98 MG/DL (ref 0.7–1.25)
EOSINOPHIL # BLD AUTO: 80 CELLS/UL (ref 15–500)
EOSINOPHIL NFR BLD AUTO: 1.5 %
ERYTHROCYTE [DISTWIDTH] IN BLOOD BY AUTOMATED COUNT: 13.7 % (ref 11–15)
GLOBULIN SER CALC-MCNC: 3 G/DL (CALC) (ref 1.9–3.7)
GLUCOSE SERPL-MCNC: 106 MG/DL (ref 65–99)
HCT VFR BLD AUTO: 44.5 % (ref 38.5–50)
HGB BLD-MCNC: 15.4 G/DL (ref 13.2–17.1)
LYMPHOCYTES # BLD AUTO: 1786 CELLS/UL (ref 850–3900)
LYMPHOCYTES NFR BLD AUTO: 33.7 %
MCH RBC QN AUTO: 34.3 PG (ref 27–33)
MCHC RBC AUTO-ENTMCNC: 34.6 G/DL (ref 32–36)
MCV RBC AUTO: 99.1 FL (ref 80–100)
MONOCYTES # BLD AUTO: 504 CELLS/UL (ref 200–950)
MONOCYTES NFR BLD AUTO: 9.5 %
NEUTROPHILS # BLD AUTO: 2899 CELLS/UL (ref 1500–7800)
NEUTROPHILS NFR BLD AUTO: 54.7 %
PLATELET # BLD AUTO: 173 THOUSAND/UL (ref 140–400)
PMV BLD REES-ECKER: 11.4 FL (ref 7.5–12.5)
POTASSIUM SERPL-SCNC: 4.3 MMOL/L (ref 3.5–5.3)
PROT SERPL-MCNC: 7.2 G/DL (ref 6.1–8.1)
RBC # BLD AUTO: 4.49 MILLION/UL (ref 4.2–5.8)
SODIUM SERPL-SCNC: 140 MMOL/L (ref 135–146)
WBC # BLD AUTO: 5.3 THOUSAND/UL (ref 3.8–10.8)

## 2022-01-17 ENCOUNTER — PATIENT MESSAGE (OUTPATIENT)
Dept: HEMATOLOGY/ONCOLOGY | Facility: CLINIC | Age: 72
End: 2022-01-17
Payer: COMMERCIAL

## 2022-01-21 DIAGNOSIS — D68.61 ANTICARDIOLIPIN SYNDROME: Primary | ICD-10-CM

## 2022-02-23 DIAGNOSIS — D84.9 IMMUNOSUPPRESSED STATUS: ICD-10-CM

## 2022-06-07 LAB
ALBUMIN SERPL-MCNC: 4.1 G/DL (ref 3.6–5.1)
ALBUMIN/GLOB SERPL: 1.3 (CALC) (ref 1–2.5)
ALP SERPL-CCNC: 72 U/L (ref 35–144)
ALT SERPL-CCNC: 19 U/L (ref 9–46)
AST SERPL-CCNC: 18 U/L (ref 10–35)
BASOPHILS # BLD AUTO: 28 CELLS/UL (ref 0–200)
BASOPHILS NFR BLD AUTO: 0.5 %
BILIRUB SERPL-MCNC: 0.4 MG/DL (ref 0.2–1.2)
BUN SERPL-MCNC: 18 MG/DL (ref 7–25)
BUN/CREAT SERPL: ABNORMAL (CALC) (ref 6–22)
CALCIUM SERPL-MCNC: 9.1 MG/DL (ref 8.6–10.3)
CHLORIDE SERPL-SCNC: 109 MMOL/L (ref 98–110)
CO2 SERPL-SCNC: 24 MMOL/L (ref 20–32)
CREAT SERPL-MCNC: 0.96 MG/DL (ref 0.7–1.18)
EOSINOPHIL # BLD AUTO: 83 CELLS/UL (ref 15–500)
EOSINOPHIL NFR BLD AUTO: 1.5 %
ERYTHROCYTE [DISTWIDTH] IN BLOOD BY AUTOMATED COUNT: 13.1 % (ref 11–15)
GLOBULIN SER CALC-MCNC: 3.1 G/DL (CALC) (ref 1.9–3.7)
GLUCOSE SERPL-MCNC: 116 MG/DL (ref 65–99)
HCT VFR BLD AUTO: 45.5 % (ref 38.5–50)
HGB BLD-MCNC: 15.6 G/DL (ref 13.2–17.1)
LYMPHOCYTES # BLD AUTO: 1766 CELLS/UL (ref 850–3900)
LYMPHOCYTES NFR BLD AUTO: 32.1 %
MCH RBC QN AUTO: 35.3 PG (ref 27–33)
MCHC RBC AUTO-ENTMCNC: 34.3 G/DL (ref 32–36)
MCV RBC AUTO: 102.9 FL (ref 80–100)
MONOCYTES # BLD AUTO: 440 CELLS/UL (ref 200–950)
MONOCYTES NFR BLD AUTO: 8 %
NEUTROPHILS # BLD AUTO: 3185 CELLS/UL (ref 1500–7800)
NEUTROPHILS NFR BLD AUTO: 57.9 %
PLATELET # BLD AUTO: 155 THOUSAND/UL (ref 140–400)
PMV BLD REES-ECKER: 11.4 FL (ref 7.5–12.5)
POTASSIUM SERPL-SCNC: 4.2 MMOL/L (ref 3.5–5.3)
PROT SERPL-MCNC: 7.2 G/DL (ref 6.1–8.1)
RBC # BLD AUTO: 4.42 MILLION/UL (ref 4.2–5.8)
SODIUM SERPL-SCNC: 141 MMOL/L (ref 135–146)
WBC # BLD AUTO: 5.5 THOUSAND/UL (ref 3.8–10.8)

## 2022-06-20 NOTE — PROGRESS NOTES
PROGRESS NOTE    Subjective:       Patient ID: Matt Rae is a 72 y.o. male.    Chief Complaint:  No chief complaint on file.  Hypercoag state,(ACL) prior PE.      History of Present Illness:   Matt Rae is a 72 y.o. male who presents with a history of hypercoag state and macrocytosis.      Patient has no new complaints at this time.  Continues to have back issues.      Continues on Xarelto 15mg daily as of today, 6/21/2022.  He is doing ok on this currently. No bleeding problems.       back surgery 5/3/2021 and required a second surgery as well.    Patient had 3 coronary stents placed Feb 2020.         Family and Social history reviewed and is unchanged from 12/9/2014.      ROS:  Review of Systems   Constitutional: Negative for fever and unexpected weight change.   HENT: Negative for nosebleeds.    Respiratory: Negative for chest tightness and shortness of breath.    Cardiovascular: Negative for chest pain.   Gastrointestinal: Negative for abdominal pain and blood in stool.   Genitourinary: Negative for hematuria.   Skin: Negative for rash.   Hematological: Does not bruise/bleed easily.          Current Outpatient Medications:     allopurinoL (ZYLOPRIM) 300 MG tablet, , Disp: , Rfl:     b complex vitamins tablet, Take 1 tablet by mouth once daily., Disp: , Rfl:     captopriL (CAPOTEN) 12.5 MG tablet, Take 1 tablet (12.5 mg total) by mouth 2 (two) times daily., Disp: 180 tablet, Rfl: 3    cholecalciferol, vitamin D3, 125 mcg (5,000 unit) Tab, 5,000 Units once daily. , Disp: , Rfl:     magnesium oxide (MAG-OX) 400 mg (241.3 mg magnesium) tablet, Take 400 mg by mouth once daily. , Disp: , Rfl:     methocarbamol (ROBAXIN) 750 MG Tab, Take 750 mg by mouth as needed. , Disp: , Rfl:     metoprolol succinate (TOPROL-XL) 25 MG 24 hr tablet, Take 1 tablet (25 mg total) by mouth once daily., Disp: 90 tablet, Rfl: 3    multivitamin capsule, Take 1  "capsule by mouth once daily., Disp: , Rfl:     predniSONE (DELTASONE) 20 MG tablet, Take 20 mg by mouth daily as needed (Gout)., Disp: , Rfl:     rivaroxaban (XARELTO) 15 mg Tab, Take 1 tablet (15 mg total) by mouth once daily., Disp: 90 tablet, Rfl: 4    simvastatin (ZOCOR) 10 MG tablet, Take 1 tablet (10 mg total) by mouth every evening., Disp: 90 tablet, Rfl: 3    tadalafil (CIALIS) 20 MG Tab, Take 20 mg by mouth daily as needed. , Disp: , Rfl:     tramadol (ULTRAM) 50 mg tablet, Take 50 mg by mouth every 8 (eight) hours as needed. , Disp: , Rfl:         Objective:       Physical Examination:     BP (!) 152/80   Pulse 61   Temp 98.6 °F (37 °C)   Resp 18   Ht 6' 1" (1.854 m)   Wt 122 kg (269 lb)   BMI 35.49 kg/m²     Physical Exam  Vitals reviewed.   Constitutional:       Appearance: He is well-developed.   HENT:      Head: Normocephalic and atraumatic.      Right Ear: External ear normal.      Left Ear: External ear normal.   Eyes:      General: No scleral icterus.     Conjunctiva/sclera: Conjunctivae normal.      Pupils: Pupils are equal, round, and reactive to light.   Cardiovascular:      Rate and Rhythm: Normal rate and regular rhythm.      Heart sounds: Normal heart sounds. No murmur heard.    No friction rub. No gallop.   Pulmonary:      Effort: Pulmonary effort is normal. No respiratory distress.      Breath sounds: Normal breath sounds. No rales.   Chest:      Chest wall: No tenderness.   Breasts:      Right: No supraclavicular adenopathy.      Left: No supraclavicular adenopathy.       Abdominal:      General: Bowel sounds are normal. There is no distension.      Palpations: Abdomen is soft. There is no mass.      Tenderness: There is no abdominal tenderness. There is no guarding or rebound.   Musculoskeletal:      Cervical back: Normal range of motion and neck supple.   Lymphadenopathy:      Head:      Right side of head: No tonsillar adenopathy.      Left side of head: No tonsillar " adenopathy.      Cervical: No cervical adenopathy.      Upper Body:      Right upper body: No supraclavicular adenopathy.      Left upper body: No supraclavicular adenopathy.   Neurological:      Mental Status: He is alert and oriented to person, place, and time.   Psychiatric:         Behavior: Behavior normal.         Thought Content: Thought content normal.         Judgment: Judgment normal.         Labs:   No results found for this or any previous visit (from the past 336 hour(s)).  CMP  Sodium   Date Value Ref Range Status   06/06/2022 141 135 - 146 mmol/L Final   03/07/2019 139 134 - 144 mmol/L      Potassium   Date Value Ref Range Status   06/06/2022 4.2 3.5 - 5.3 mmol/L Final     Chloride   Date Value Ref Range Status   06/06/2022 109 98 - 110 mmol/L Final   03/07/2019 106 98 - 110 mmol/L      CO2   Date Value Ref Range Status   06/06/2022 24 20 - 32 mmol/L Final     Glucose   Date Value Ref Range Status   06/06/2022 116 (H) 65 - 99 mg/dL Final     Comment:                   Fasting reference interval     For someone without known diabetes, a glucose value  between 100 and 125 mg/dL is consistent with  prediabetes and should be confirmed with a  follow-up test.        03/07/2019 125 (H) 70 - 99 mg/dL      BUN   Date Value Ref Range Status   06/06/2022 18 7 - 25 mg/dL Final     Creatinine   Date Value Ref Range Status   06/06/2022 0.96 0.70 - 1.18 mg/dL Final     Comment:     For patients >49 years of age, the reference limit  for Creatinine is approximately 13% higher for people  identified as -American.        03/07/2019 0.99 0.60 - 1.40 mg/dL    01/27/2013 1.0 0.5 - 1.4 mg/dL Final     Calcium   Date Value Ref Range Status   06/06/2022 9.1 8.6 - 10.3 mg/dL Final   01/27/2013 9.5 8.7 - 10.5 mg/dL Final     Total Protein   Date Value Ref Range Status   06/06/2022 7.2 6.1 - 8.1 g/dL Final     Albumin   Date Value Ref Range Status   06/06/2022 4.1 3.6 - 5.1 g/dL Final   03/07/2019 3.6 3.1 - 4.7 g/dL       Total Bilirubin   Date Value Ref Range Status   06/06/2022 0.4 0.2 - 1.2 mg/dL Final     Alkaline Phosphatase   Date Value Ref Range Status   07/01/2020 59 35 - 144 U/L Final   01/27/2013 63 55 - 135 U/L Final     AST ([place])   Date Value Ref Range Status   03/09/2016 29 17 - 59 U/L Final     AST   Date Value Ref Range Status   06/06/2022 18 10 - 35 U/L Final     ALT   Date Value Ref Range Status   06/06/2022 19 9 - 46 U/L Final     Anion Gap   Date Value Ref Range Status   02/27/2020 7 (L) 8 - 16 mmol/L Final   01/27/2013 13 5 - 15 meq/L Final     eGFR if    Date Value Ref Range Status   06/06/2022 92 > OR = 60 mL/min/1.73m2 Final     eGFR if non    Date Value Ref Range Status   06/06/2022 80 > OR = 60 mL/min/1.73m2 Final     No results found for: CEA  Lab Results   Component Value Date    PSA 1.0 01/26/2016    PSA 0.57 05/14/2010    PSA 0.8 06/20/2008           Assessment/Plan:   History of pulmonary embolus (PE)  Patient is doing well and remains on Xarelto long term.  He has had no recent PE or bleeding issues.  Will continue to see him every year and discussed this today.  Labs are normal as well.      Discussion:     Follow up in about 1 year (around 6/21/2023).      Electronically signed by Silvestre Albert

## 2022-06-21 ENCOUNTER — OFFICE VISIT (OUTPATIENT)
Dept: HEMATOLOGY/ONCOLOGY | Facility: CLINIC | Age: 72
End: 2022-06-21
Payer: COMMERCIAL

## 2022-06-21 VITALS
TEMPERATURE: 99 F | SYSTOLIC BLOOD PRESSURE: 152 MMHG | BODY MASS INDEX: 35.65 KG/M2 | HEART RATE: 61 BPM | RESPIRATION RATE: 18 BRPM | DIASTOLIC BLOOD PRESSURE: 80 MMHG | WEIGHT: 269 LBS | HEIGHT: 73 IN

## 2022-06-21 DIAGNOSIS — Z86.711 HISTORY OF PULMONARY EMBOLUS (PE): Chronic | ICD-10-CM

## 2022-06-21 PROCEDURE — 3079F PR MOST RECENT DIASTOLIC BLOOD PRESSURE 80-89 MM HG: ICD-10-PCS | Mod: CPTII,S$GLB,, | Performed by: INTERNAL MEDICINE

## 2022-06-21 PROCEDURE — 1101F PT FALLS ASSESS-DOCD LE1/YR: CPT | Mod: CPTII,S$GLB,, | Performed by: INTERNAL MEDICINE

## 2022-06-21 PROCEDURE — 1101F PR PT FALLS ASSESS DOC 0-1 FALLS W/OUT INJ PAST YR: ICD-10-PCS | Mod: CPTII,S$GLB,, | Performed by: INTERNAL MEDICINE

## 2022-06-21 PROCEDURE — 3079F DIAST BP 80-89 MM HG: CPT | Mod: CPTII,S$GLB,, | Performed by: INTERNAL MEDICINE

## 2022-06-21 PROCEDURE — 3077F PR MOST RECENT SYSTOLIC BLOOD PRESSURE >= 140 MM HG: ICD-10-PCS | Mod: CPTII,S$GLB,, | Performed by: INTERNAL MEDICINE

## 2022-06-21 PROCEDURE — 4010F ACE/ARB THERAPY RXD/TAKEN: CPT | Mod: CPTII,S$GLB,, | Performed by: INTERNAL MEDICINE

## 2022-06-21 PROCEDURE — 3008F PR BODY MASS INDEX (BMI) DOCUMENTED: ICD-10-PCS | Mod: CPTII,S$GLB,, | Performed by: INTERNAL MEDICINE

## 2022-06-21 PROCEDURE — 1160F PR REVIEW ALL MEDS BY PRESCRIBER/CLIN PHARMACIST DOCUMENTED: ICD-10-PCS | Mod: CPTII,S$GLB,, | Performed by: INTERNAL MEDICINE

## 2022-06-21 PROCEDURE — 3288F FALL RISK ASSESSMENT DOCD: CPT | Mod: CPTII,S$GLB,, | Performed by: INTERNAL MEDICINE

## 2022-06-21 PROCEDURE — 99213 PR OFFICE/OUTPT VISIT, EST, LEVL III, 20-29 MIN: ICD-10-PCS | Mod: S$GLB,,, | Performed by: INTERNAL MEDICINE

## 2022-06-21 PROCEDURE — 3288F PR FALLS RISK ASSESSMENT DOCUMENTED: ICD-10-PCS | Mod: CPTII,S$GLB,, | Performed by: INTERNAL MEDICINE

## 2022-06-21 PROCEDURE — 1125F PR PAIN SEVERITY QUANTIFIED, PAIN PRESENT: ICD-10-PCS | Mod: CPTII,S$GLB,, | Performed by: INTERNAL MEDICINE

## 2022-06-21 PROCEDURE — 1125F AMNT PAIN NOTED PAIN PRSNT: CPT | Mod: CPTII,S$GLB,, | Performed by: INTERNAL MEDICINE

## 2022-06-21 PROCEDURE — 3077F SYST BP >= 140 MM HG: CPT | Mod: CPTII,S$GLB,, | Performed by: INTERNAL MEDICINE

## 2022-06-21 PROCEDURE — 1160F RVW MEDS BY RX/DR IN RCRD: CPT | Mod: CPTII,S$GLB,, | Performed by: INTERNAL MEDICINE

## 2022-06-21 PROCEDURE — 99213 OFFICE O/P EST LOW 20 MIN: CPT | Mod: S$GLB,,, | Performed by: INTERNAL MEDICINE

## 2022-06-21 PROCEDURE — 1159F MED LIST DOCD IN RCRD: CPT | Mod: CPTII,S$GLB,, | Performed by: INTERNAL MEDICINE

## 2022-06-21 PROCEDURE — 4010F PR ACE/ARB THEARPY RXD/TAKEN: ICD-10-PCS | Mod: CPTII,S$GLB,, | Performed by: INTERNAL MEDICINE

## 2022-06-21 PROCEDURE — 3008F BODY MASS INDEX DOCD: CPT | Mod: CPTII,S$GLB,, | Performed by: INTERNAL MEDICINE

## 2022-06-21 PROCEDURE — 1159F PR MEDICATION LIST DOCUMENTED IN MEDICAL RECORD: ICD-10-PCS | Mod: CPTII,S$GLB,, | Performed by: INTERNAL MEDICINE

## 2022-06-21 NOTE — ASSESSMENT & PLAN NOTE
Patient is doing well and remains on Xarelto long term.  He has had no recent PE or bleeding issues.  Will continue to see him every year and discussed this today.  Labs are normal as well.

## 2022-10-19 ENCOUNTER — TELEPHONE (OUTPATIENT)
Dept: SURGERY | Facility: CLINIC | Age: 72
End: 2022-10-19
Payer: COMMERCIAL

## 2022-10-19 NOTE — TELEPHONE ENCOUNTER
I called and spoke to patients wife and scheduled him tomorrow @ 2:15pm in [place] to see Dr. Corona for a bleeding hemorrhoid. Phillip

## 2022-10-19 NOTE — TELEPHONE ENCOUNTER
----- Message from Daniella Bah sent at 10/19/2022  9:41 AM CDT -----  Contact: [phone]          Type:  Same Day Appointment Request    Caller is requesting a same day appointment.  Caller declined first available appointment listed below.      Name of Caller:  Nikole with Dr Mai office   When is the first available appointment?  10/26  Symptoms:  Bleeding Hemorid   Best Call Back Number:  [phone]  Additional Information:   Pls call Nikole back and advise

## 2022-10-20 ENCOUNTER — OFFICE VISIT (OUTPATIENT)
Dept: SURGERY | Facility: CLINIC | Age: 72
End: 2022-10-20
Payer: COMMERCIAL

## 2022-10-20 VITALS
BODY MASS INDEX: 35.85 KG/M2 | SYSTOLIC BLOOD PRESSURE: 165 MMHG | WEIGHT: 270.5 LBS | HEART RATE: 58 BPM | HEIGHT: 73 IN | TEMPERATURE: 99 F | DIASTOLIC BLOOD PRESSURE: 92 MMHG

## 2022-10-20 DIAGNOSIS — K64.3 GRADE IV HEMORRHOIDS: Primary | ICD-10-CM

## 2022-10-20 PROCEDURE — 99999 PR PBB SHADOW E&M-EST. PATIENT-LVL IV: ICD-10-PCS | Mod: PBBFAC,,, | Performed by: SURGERY

## 2022-10-20 PROCEDURE — 1101F PT FALLS ASSESS-DOCD LE1/YR: CPT | Mod: CPTII,S$GLB,, | Performed by: SURGERY

## 2022-10-20 PROCEDURE — 3288F PR FALLS RISK ASSESSMENT DOCUMENTED: ICD-10-PCS | Mod: CPTII,S$GLB,, | Performed by: SURGERY

## 2022-10-20 PROCEDURE — 1159F PR MEDICATION LIST DOCUMENTED IN MEDICAL RECORD: ICD-10-PCS | Mod: CPTII,S$GLB,, | Performed by: SURGERY

## 2022-10-20 PROCEDURE — 4010F ACE/ARB THERAPY RXD/TAKEN: CPT | Mod: CPTII,S$GLB,, | Performed by: SURGERY

## 2022-10-20 PROCEDURE — 99203 PR OFFICE/OUTPT VISIT, NEW, LEVL III, 30-44 MIN: ICD-10-PCS | Mod: S$GLB,,, | Performed by: SURGERY

## 2022-10-20 PROCEDURE — 1125F PR PAIN SEVERITY QUANTIFIED, PAIN PRESENT: ICD-10-PCS | Mod: CPTII,S$GLB,, | Performed by: SURGERY

## 2022-10-20 PROCEDURE — 4010F PR ACE/ARB THEARPY RXD/TAKEN: ICD-10-PCS | Mod: CPTII,S$GLB,, | Performed by: SURGERY

## 2022-10-20 PROCEDURE — 99203 OFFICE O/P NEW LOW 30 MIN: CPT | Mod: S$GLB,,, | Performed by: SURGERY

## 2022-10-20 PROCEDURE — 3288F FALL RISK ASSESSMENT DOCD: CPT | Mod: CPTII,S$GLB,, | Performed by: SURGERY

## 2022-10-20 PROCEDURE — 3077F PR MOST RECENT SYSTOLIC BLOOD PRESSURE >= 140 MM HG: ICD-10-PCS | Mod: CPTII,S$GLB,, | Performed by: SURGERY

## 2022-10-20 PROCEDURE — 3077F SYST BP >= 140 MM HG: CPT | Mod: CPTII,S$GLB,, | Performed by: SURGERY

## 2022-10-20 PROCEDURE — 1101F PR PT FALLS ASSESS DOC 0-1 FALLS W/OUT INJ PAST YR: ICD-10-PCS | Mod: CPTII,S$GLB,, | Performed by: SURGERY

## 2022-10-20 PROCEDURE — 99999 PR PBB SHADOW E&M-EST. PATIENT-LVL IV: CPT | Mod: PBBFAC,,, | Performed by: SURGERY

## 2022-10-20 PROCEDURE — 3080F DIAST BP >= 90 MM HG: CPT | Mod: CPTII,S$GLB,, | Performed by: SURGERY

## 2022-10-20 PROCEDURE — 1159F MED LIST DOCD IN RCRD: CPT | Mod: CPTII,S$GLB,, | Performed by: SURGERY

## 2022-10-20 PROCEDURE — 1125F AMNT PAIN NOTED PAIN PRSNT: CPT | Mod: CPTII,S$GLB,, | Performed by: SURGERY

## 2022-10-20 PROCEDURE — 3080F PR MOST RECENT DIASTOLIC BLOOD PRESSURE >= 90 MM HG: ICD-10-PCS | Mod: CPTII,S$GLB,, | Performed by: SURGERY

## 2022-10-20 RX ORDER — HYDROCORTISONE 25 MG/G
CREAM TOPICAL
COMMUNITY
Start: 2022-10-17 | End: 2023-07-05

## 2022-10-20 RX ORDER — LEVOTHYROXINE SODIUM 50 UG/1
50 TABLET ORAL
COMMUNITY
End: 2022-12-07

## 2022-10-20 RX ORDER — LEVOTHYROXINE SODIUM 75 UG/1
75 TABLET ORAL EVERY MORNING
COMMUNITY
Start: 2022-10-19

## 2022-10-20 NOTE — Clinical Note
I saw your patient, Matt Rae in the office.  Attached are my findings and plan.  Thank you for referring him to my office and if you have any questions please do not hesitate to call my cell [phone].  Tha Corona

## 2022-10-20 NOTE — PROGRESS NOTES
Subjective:       Patient ID: Matt Rae is a 72 y.o. male.    Chief Complaint: Consult (Bleeding hemorrhoid)    HPI  pleasant 72-year-old gentleman who was referred to me in consultation from  for evaluation of his hemorrhoids.  Patient notes that he had a colonoscopy on Monday of this week.  He states that Sunday night after the prep he developed significant pain and discomfort in the perianal area.  Noted swelling and irritation.  This has persisted since the colonoscopy.  He has also had bleeding.  He states the pain has improved a little bit but does have continued swelling.  No other significant complaints.  Patient is on Eliquis secondary to coronary stent.  He also has history of PE.  No significant abdominal rectal surgical history.  Review of Systems   Constitutional:  Negative for activity change and appetite change.   Gastrointestinal:  Positive for anal bleeding and blood in stool. Negative for abdominal distention.   Hematological:  Negative for adenopathy. Does not bruise/bleed easily.   Psychiatric/Behavioral:  Negative for agitation and decreased concentration.        Objective:      Physical Exam  Vitals reviewed.   Cardiovascular:      Rate and Rhythm: Normal rate.      Pulses: Normal pulses.   Pulmonary:      Effort: Pulmonary effort is normal.   Abdominal:      General: Abdomen is flat. There is no distension.      Hernia: No hernia is present.   Genitourinary:     Comments: External exam does demonstrate large external hemorrhoids circumferentially.  Particularly noticeable in the right posterior and left lateral positions.  Moderate size clot was removed from the right posterior position.  Digital rectal exam not performed  Neurological:      Mental Status: He is alert.       Assessment:     Grade III hemorrhoids  Problem List Items Addressed This Visit    None      Plan:       Patient's symptoms are getting better over the last 24-48 hours.  Continue conservative management  with palpable steroid cream.  Have also encouraged warm baths.  Once swelling subsides could consider banding electively as an outpatient.  Would not recommend any intervention at the present time as the symptoms are getting better.

## 2023-01-18 ENCOUNTER — LAB VISIT (OUTPATIENT)
Dept: LAB | Facility: HOSPITAL | Age: 73
End: 2023-01-18
Attending: INTERNAL MEDICINE
Payer: COMMERCIAL

## 2023-01-18 DIAGNOSIS — Z00.00 ROUTINE GENERAL MEDICAL EXAMINATION AT A HEALTH CARE FACILITY: Primary | ICD-10-CM

## 2023-01-18 LAB
ALBUMIN SERPL BCP-MCNC: 3.8 G/DL (ref 3.5–5.2)
ALP SERPL-CCNC: 60 U/L (ref 55–135)
ALT SERPL W/O P-5'-P-CCNC: 23 U/L (ref 10–44)
ANION GAP SERPL CALC-SCNC: 7 MMOL/L (ref 8–16)
AST SERPL-CCNC: 25 U/L (ref 10–40)
BASOPHILS # BLD AUTO: 0.04 K/UL (ref 0–0.2)
BASOPHILS NFR BLD: 0.6 % (ref 0–1.9)
BILIRUB SERPL-MCNC: 0.5 MG/DL (ref 0.1–1)
BUN SERPL-MCNC: 19 MG/DL (ref 8–23)
CALCIUM SERPL-MCNC: 9.3 MG/DL (ref 8.7–10.5)
CHLORIDE SERPL-SCNC: 103 MMOL/L (ref 95–110)
CO2 SERPL-SCNC: 26 MMOL/L (ref 23–29)
COMPLEXED PSA SERPL-MCNC: 1.2 NG/ML (ref 0–4)
CREAT SERPL-MCNC: 1 MG/DL (ref 0.5–1.4)
DIFFERENTIAL METHOD: ABNORMAL
EOSINOPHIL # BLD AUTO: 0.1 K/UL (ref 0–0.5)
EOSINOPHIL NFR BLD: 1.9 % (ref 0–8)
ERYTHROCYTE [DISTWIDTH] IN BLOOD BY AUTOMATED COUNT: 13.5 % (ref 11.5–14.5)
EST. GFR  (NO RACE VARIABLE): >60 ML/MIN/1.73 M^2
GLUCOSE SERPL-MCNC: 114 MG/DL (ref 70–110)
HCT VFR BLD AUTO: 45.7 % (ref 40–54)
HGB BLD-MCNC: 15.4 G/DL (ref 14–18)
IMM GRANULOCYTES # BLD AUTO: 0.02 K/UL (ref 0–0.04)
IMM GRANULOCYTES NFR BLD AUTO: 0.3 % (ref 0–0.5)
LYMPHOCYTES # BLD AUTO: 2.4 K/UL (ref 1–4.8)
LYMPHOCYTES NFR BLD: 34.1 % (ref 18–48)
MCH RBC QN AUTO: 34.8 PG (ref 27–31)
MCHC RBC AUTO-ENTMCNC: 33.7 G/DL (ref 32–36)
MCV RBC AUTO: 103 FL (ref 82–98)
MONOCYTES # BLD AUTO: 0.6 K/UL (ref 0.3–1)
MONOCYTES NFR BLD: 9.2 % (ref 4–15)
NEUTROPHILS # BLD AUTO: 3.8 K/UL (ref 1.8–7.7)
NEUTROPHILS NFR BLD: 53.9 % (ref 38–73)
NRBC BLD-RTO: 0 /100 WBC
PLATELET # BLD AUTO: 173 K/UL (ref 150–450)
PMV BLD AUTO: 9.8 FL (ref 9.2–12.9)
POTASSIUM SERPL-SCNC: 4.4 MMOL/L (ref 3.5–5.1)
PROT SERPL-MCNC: 7.5 G/DL (ref 6–8.4)
RBC # BLD AUTO: 4.43 M/UL (ref 4.6–6.2)
SODIUM SERPL-SCNC: 136 MMOL/L (ref 136–145)
WBC # BLD AUTO: 6.95 K/UL (ref 3.9–12.7)

## 2023-01-18 PROCEDURE — 80053 COMPREHEN METABOLIC PANEL: CPT | Performed by: INTERNAL MEDICINE

## 2023-01-18 PROCEDURE — 36415 COLL VENOUS BLD VENIPUNCTURE: CPT | Performed by: INTERNAL MEDICINE

## 2023-01-18 PROCEDURE — 85025 COMPLETE CBC W/AUTO DIFF WBC: CPT | Performed by: INTERNAL MEDICINE

## 2023-01-18 PROCEDURE — 84153 ASSAY OF PSA TOTAL: CPT | Performed by: INTERNAL MEDICINE

## 2023-03-28 ENCOUNTER — PATIENT MESSAGE (OUTPATIENT)
Dept: HEMATOLOGY/ONCOLOGY | Facility: CLINIC | Age: 73
End: 2023-03-28

## 2023-06-10 LAB
ALBUMIN SERPL-MCNC: 3.8 G/DL (ref 3.6–5.1)
ALBUMIN/GLOB SERPL: 1.2 (CALC) (ref 1–2.5)
ALP SERPL-CCNC: 58 U/L (ref 35–144)
ALT SERPL-CCNC: 27 U/L (ref 9–46)
AST SERPL-CCNC: 25 U/L (ref 10–35)
BASOPHILS # BLD AUTO: 43 CELLS/UL (ref 0–200)
BASOPHILS NFR BLD AUTO: 0.7 %
BILIRUB SERPL-MCNC: 0.6 MG/DL (ref 0.2–1.2)
BUN SERPL-MCNC: 19 MG/DL (ref 7–25)
BUN/CREAT SERPL: ABNORMAL (CALC) (ref 6–22)
CALCIUM SERPL-MCNC: 9.3 MG/DL (ref 8.6–10.3)
CHLORIDE SERPL-SCNC: 108 MMOL/L (ref 98–110)
CO2 SERPL-SCNC: 22 MMOL/L (ref 20–32)
CREAT SERPL-MCNC: 0.85 MG/DL (ref 0.7–1.28)
EGFR: 93 ML/MIN/1.73M2
EOSINOPHIL # BLD AUTO: 153 CELLS/UL (ref 15–500)
EOSINOPHIL NFR BLD AUTO: 2.5 %
ERYTHROCYTE [DISTWIDTH] IN BLOOD BY AUTOMATED COUNT: 13.5 % (ref 11–15)
GLOBULIN SER CALC-MCNC: 3.2 G/DL (CALC) (ref 1.9–3.7)
GLUCOSE SERPL-MCNC: 121 MG/DL (ref 65–99)
HCT VFR BLD AUTO: 44.9 % (ref 38.5–50)
HGB BLD-MCNC: 15.4 G/DL (ref 13.2–17.1)
LYMPHOCYTES # BLD AUTO: 1610 CELLS/UL (ref 850–3900)
LYMPHOCYTES NFR BLD AUTO: 26.4 %
MCH RBC QN AUTO: 34.5 PG (ref 27–33)
MCHC RBC AUTO-ENTMCNC: 34.3 G/DL (ref 32–36)
MCV RBC AUTO: 100.4 FL (ref 80–100)
MONOCYTES # BLD AUTO: 543 CELLS/UL (ref 200–950)
MONOCYTES NFR BLD AUTO: 8.9 %
NEUTROPHILS # BLD AUTO: 3752 CELLS/UL (ref 1500–7800)
NEUTROPHILS NFR BLD AUTO: 61.5 %
PLATELET # BLD AUTO: 190 THOUSAND/UL (ref 140–400)
PMV BLD REES-ECKER: 11.6 FL (ref 7.5–12.5)
POTASSIUM SERPL-SCNC: 4.2 MMOL/L (ref 3.5–5.3)
PROT SERPL-MCNC: 7 G/DL (ref 6.1–8.1)
RBC # BLD AUTO: 4.47 MILLION/UL (ref 4.2–5.8)
SODIUM SERPL-SCNC: 140 MMOL/L (ref 135–146)
WBC # BLD AUTO: 6.1 THOUSAND/UL (ref 3.8–10.8)

## 2023-06-21 ENCOUNTER — OFFICE VISIT (OUTPATIENT)
Dept: HEMATOLOGY/ONCOLOGY | Facility: CLINIC | Age: 73
End: 2023-06-21
Payer: COMMERCIAL

## 2023-06-21 VITALS
TEMPERATURE: 97 F | HEART RATE: 65 BPM | BODY MASS INDEX: 34.06 KG/M2 | HEIGHT: 73 IN | RESPIRATION RATE: 18 BRPM | WEIGHT: 257 LBS | SYSTOLIC BLOOD PRESSURE: 173 MMHG | DIASTOLIC BLOOD PRESSURE: 96 MMHG

## 2023-06-21 DIAGNOSIS — D75.89 MACROCYTOSIS WITHOUT ANEMIA: Chronic | ICD-10-CM

## 2023-06-21 DIAGNOSIS — Z86.711 HISTORY OF PULMONARY EMBOLUS (PE): Chronic | ICD-10-CM

## 2023-06-21 PROCEDURE — 1101F PT FALLS ASSESS-DOCD LE1/YR: CPT | Mod: CPTII,S$GLB,, | Performed by: INTERNAL MEDICINE

## 2023-06-21 PROCEDURE — 99213 PR OFFICE/OUTPT VISIT, EST, LEVL III, 20-29 MIN: ICD-10-PCS | Mod: S$GLB,,, | Performed by: INTERNAL MEDICINE

## 2023-06-21 PROCEDURE — 99213 OFFICE O/P EST LOW 20 MIN: CPT | Mod: S$GLB,,, | Performed by: INTERNAL MEDICINE

## 2023-06-21 PROCEDURE — 3288F FALL RISK ASSESSMENT DOCD: CPT | Mod: CPTII,S$GLB,, | Performed by: INTERNAL MEDICINE

## 2023-06-21 PROCEDURE — 1125F AMNT PAIN NOTED PAIN PRSNT: CPT | Mod: CPTII,S$GLB,, | Performed by: INTERNAL MEDICINE

## 2023-06-21 PROCEDURE — 3288F PR FALLS RISK ASSESSMENT DOCUMENTED: ICD-10-PCS | Mod: CPTII,S$GLB,, | Performed by: INTERNAL MEDICINE

## 2023-06-21 PROCEDURE — 3008F BODY MASS INDEX DOCD: CPT | Mod: CPTII,S$GLB,, | Performed by: INTERNAL MEDICINE

## 2023-06-21 PROCEDURE — 1160F RVW MEDS BY RX/DR IN RCRD: CPT | Mod: CPTII,S$GLB,, | Performed by: INTERNAL MEDICINE

## 2023-06-21 PROCEDURE — 3080F PR MOST RECENT DIASTOLIC BLOOD PRESSURE >= 90 MM HG: ICD-10-PCS | Mod: CPTII,S$GLB,, | Performed by: INTERNAL MEDICINE

## 2023-06-21 PROCEDURE — 1125F PR PAIN SEVERITY QUANTIFIED, PAIN PRESENT: ICD-10-PCS | Mod: CPTII,S$GLB,, | Performed by: INTERNAL MEDICINE

## 2023-06-21 PROCEDURE — 1160F PR REVIEW ALL MEDS BY PRESCRIBER/CLIN PHARMACIST DOCUMENTED: ICD-10-PCS | Mod: CPTII,S$GLB,, | Performed by: INTERNAL MEDICINE

## 2023-06-21 PROCEDURE — 3077F SYST BP >= 140 MM HG: CPT | Mod: CPTII,S$GLB,, | Performed by: INTERNAL MEDICINE

## 2023-06-21 PROCEDURE — 4010F ACE/ARB THERAPY RXD/TAKEN: CPT | Mod: CPTII,S$GLB,, | Performed by: INTERNAL MEDICINE

## 2023-06-21 PROCEDURE — 3080F DIAST BP >= 90 MM HG: CPT | Mod: CPTII,S$GLB,, | Performed by: INTERNAL MEDICINE

## 2023-06-21 PROCEDURE — 1159F MED LIST DOCD IN RCRD: CPT | Mod: CPTII,S$GLB,, | Performed by: INTERNAL MEDICINE

## 2023-06-21 PROCEDURE — 1159F PR MEDICATION LIST DOCUMENTED IN MEDICAL RECORD: ICD-10-PCS | Mod: CPTII,S$GLB,, | Performed by: INTERNAL MEDICINE

## 2023-06-21 PROCEDURE — 4010F PR ACE/ARB THEARPY RXD/TAKEN: ICD-10-PCS | Mod: CPTII,S$GLB,, | Performed by: INTERNAL MEDICINE

## 2023-06-21 PROCEDURE — 3077F PR MOST RECENT SYSTOLIC BLOOD PRESSURE >= 140 MM HG: ICD-10-PCS | Mod: CPTII,S$GLB,, | Performed by: INTERNAL MEDICINE

## 2023-06-21 PROCEDURE — 3008F PR BODY MASS INDEX (BMI) DOCUMENTED: ICD-10-PCS | Mod: CPTII,S$GLB,, | Performed by: INTERNAL MEDICINE

## 2023-06-21 PROCEDURE — 1101F PR PT FALLS ASSESS DOC 0-1 FALLS W/OUT INJ PAST YR: ICD-10-PCS | Mod: CPTII,S$GLB,, | Performed by: INTERNAL MEDICINE

## 2023-06-21 NOTE — PROGRESS NOTES
PROGRESS NOTE    Subjective:       Patient ID: Matt Rae is a 73 y.o. male.    Chief Complaint:  No chief complaint on file.  Hypercoag state,(ACL) prior PE.      History of Present Illness:   Matt Rae is a 73 y.o. male who presents with a history of hypercoag state and macrocytosis.      Patient has no new complaints at this time.  Continues to have back issues.      Continues on Xarelto 15mg daily as of today, 6/21/2023  He is doing ok on this currently. No bleeding problems.       back surgery 5/3/2021 and required a second surgery as well.    Patient had 3 coronary stents placed Feb 2020.         Family and Social history reviewed and is unchanged from 12/9/2014.      ROS:  Review of Systems   Constitutional:  Negative for appetite change, fever and unexpected weight change.   HENT:  Negative for mouth sores and nosebleeds.    Eyes:  Negative for visual disturbance.   Respiratory:  Negative for cough, chest tightness and shortness of breath.    Cardiovascular:  Negative for chest pain.   Gastrointestinal:  Positive for diarrhea. Negative for abdominal pain and blood in stool.   Genitourinary:  Positive for frequency. Negative for hematuria.   Musculoskeletal:  Positive for back pain.   Skin:  Negative for rash.   Neurological:  Positive for headaches.   Hematological:  Negative for adenopathy. Does not bruise/bleed easily.   Psychiatric/Behavioral:  The patient is nervous/anxious.         Current Outpatient Medications:     allopurinoL (ZYLOPRIM) 300 MG tablet, , Disp: , Rfl:     b complex vitamins tablet, Take 1 tablet by mouth once daily., Disp: , Rfl:     captopriL (CAPOTEN) 12.5 MG tablet, TAKE 1 TABLET TWICE A DAY, Disp: 180 tablet, Rfl: 3    cholecalciferol, vitamin D3, 125 mcg (5,000 unit) Tab, 5,000 Units once daily. , Disp: , Rfl:     levothyroxine (SYNTHROID) 75 MCG tablet, Take 75 mcg by mouth every morning., Disp: , Rfl:      "magnesium oxide (MAG-OX) 400 mg (241.3 mg magnesium) tablet, Take 400 mg by mouth once daily. , Disp: , Rfl:     methocarbamoL (ROBAXIN) 500 MG Tab, Take 500 mg by mouth 3 (three) times daily as needed., Disp: , Rfl:     metoprolol succinate (TOPROL-XL) 25 MG 24 hr tablet, Take 1 tablet (25 mg total) by mouth once daily., Disp: 90 tablet, Rfl: 3    multivitamin capsule, Take 1 capsule by mouth once daily., Disp: , Rfl:     predniSONE (DELTASONE) 20 MG tablet, Take 20 mg by mouth daily as needed (Gout)., Disp: , Rfl:     rivaroxaban (XARELTO) 15 mg Tab, Take 1 tablet (15 mg total) by mouth once daily., Disp: 90 tablet, Rfl: 4    simvastatin (ZOCOR) 10 MG tablet, Take 1 tablet (10 mg total) by mouth every evening., Disp: 90 tablet, Rfl: 3    tadalafil (CIALIS) 20 MG Tab, Take 20 mg by mouth daily as needed. , Disp: , Rfl:     tramadol (ULTRAM) 50 mg tablet, Take 50 mg by mouth every 8 (eight) hours as needed. , Disp: , Rfl:     benzonatate (TESSALON) 100 MG capsule, , Disp: , Rfl:     hydrocortisone 2.5 % cream, SMARTSIG:Sparingly Rectally Twice Daily, Disp: , Rfl:         Objective:       Physical Examination:     BP (!) 173/96   Pulse 65   Temp 97.4 °F (36.3 °C)   Resp 18   Ht 6' 1" (1.854 m)   Wt 116.6 kg (257 lb)   BMI 33.91 kg/m²     Physical Exam  Vitals reviewed.   Constitutional:       Appearance: He is well-developed.   HENT:      Head: Normocephalic and atraumatic.      Right Ear: External ear normal.      Left Ear: External ear normal.   Eyes:      General: No scleral icterus.     Conjunctiva/sclera: Conjunctivae normal.      Pupils: Pupils are equal, round, and reactive to light.   Cardiovascular:      Rate and Rhythm: Normal rate and regular rhythm.      Heart sounds: Normal heart sounds. No murmur heard.    No friction rub. No gallop.   Pulmonary:      Effort: Pulmonary effort is normal. No respiratory distress.      Breath sounds: Normal breath sounds. No rales.   Chest:      Chest wall: No " tenderness.   Abdominal:      General: Bowel sounds are normal. There is no distension.      Palpations: Abdomen is soft. There is no mass.      Tenderness: There is no abdominal tenderness. There is no guarding or rebound.   Musculoskeletal:      Cervical back: Normal range of motion and neck supple.   Lymphadenopathy:      Head:      Right side of head: No tonsillar adenopathy.      Left side of head: No tonsillar adenopathy.      Cervical: No cervical adenopathy.      Upper Body:      Right upper body: No supraclavicular adenopathy.      Left upper body: No supraclavicular adenopathy.   Neurological:      Mental Status: He is alert and oriented to person, place, and time.   Psychiatric:         Behavior: Behavior normal.         Thought Content: Thought content normal.         Judgment: Judgment normal.       Labs:   Recent Results (from the past 336 hour(s))   CBC Auto Differential    Collection Time: 06/09/23  7:32 AM   Result Value Ref Range    WBC 6.1 3.8 - 10.8 Thousand/uL    Hemoglobin 15.4 13.2 - 17.1 g/dL    Hematocrit 44.9 38.5 - 50.0 %    Platelets 190 140 - 400 Thousand/uL     CMP  Sodium   Date Value Ref Range Status   06/09/2023 140 135 - 146 mmol/L Final   03/07/2019 139 134 - 144 mmol/L      Potassium   Date Value Ref Range Status   06/09/2023 4.2 3.5 - 5.3 mmol/L Final     Chloride   Date Value Ref Range Status   06/09/2023 108 98 - 110 mmol/L Final   03/07/2019 106 98 - 110 mmol/L      CO2   Date Value Ref Range Status   06/09/2023 22 20 - 32 mmol/L Final     Glucose   Date Value Ref Range Status   06/09/2023 121 (H) 65 - 99 mg/dL Final     Comment:                   Fasting reference interval     For someone without known diabetes, a glucose value  between 100 and 125 mg/dL is consistent with  prediabetes and should be confirmed with a  follow-up test.        03/07/2019 125 (H) 70 - 99 mg/dL      BUN   Date Value Ref Range Status   06/09/2023 19 7 - 25 mg/dL Final     Creatinine   Date Value Ref  Range Status   06/09/2023 0.85 0.70 - 1.28 mg/dL Final   03/07/2019 0.99 0.60 - 1.40 mg/dL    01/27/2013 1.0 0.5 - 1.4 mg/dL Final     Calcium   Date Value Ref Range Status   06/09/2023 9.3 8.6 - 10.3 mg/dL Final   01/27/2013 9.5 8.7 - 10.5 mg/dL Final     Total Protein   Date Value Ref Range Status   06/09/2023 7.0 6.1 - 8.1 g/dL Final     Albumin   Date Value Ref Range Status   06/09/2023 3.8 3.6 - 5.1 g/dL Final   03/07/2019 3.6 3.1 - 4.7 g/dL      Total Bilirubin   Date Value Ref Range Status   06/09/2023 0.6 0.2 - 1.2 mg/dL Final     Alkaline Phosphatase   Date Value Ref Range Status   01/18/2023 60 55 - 135 U/L Final   01/27/2013 63 55 - 135 U/L Final     AST ([place])   Date Value Ref Range Status   03/09/2016 29 17 - 59 U/L Final     AST   Date Value Ref Range Status   06/09/2023 25 10 - 35 U/L Final     ALT   Date Value Ref Range Status   06/09/2023 27 9 - 46 U/L Final     Anion Gap   Date Value Ref Range Status   01/18/2023 7 (L) 8 - 16 mmol/L Final   01/27/2013 13 5 - 15 meq/L Final     eGFR if    Date Value Ref Range Status   06/06/2022 92 > OR = 60 mL/min/1.73m2 Final     eGFR if non    Date Value Ref Range Status   06/06/2022 80 > OR = 60 mL/min/1.73m2 Final     No results found for: CEA  Lab Results   Component Value Date    PSA 1.0 01/26/2016    PSA 0.57 05/14/2010    PSA 0.8 06/20/2008           Assessment/Plan:   History of pulmonary embolus (PE)  Patient is doing ok from this standpoint and has had no further clotting issues.  He remains on prophylactic Xarelto and will continue this moving forward.      Macrocytosis without anemia  His macrocytosis is unchanged and he has no other hematologic changes at this time.  Will continue to monitor with yearly follow up.      Discussion:     Follow up in about 1 year (around 6/21/2024).      Electronically signed by Silvestre Albert

## 2023-06-21 NOTE — ASSESSMENT & PLAN NOTE
Patient is doing ok from this standpoint and has had no further clotting issues.  He remains on prophylactic Xarelto and will continue this moving forward.

## 2023-06-21 NOTE — ASSESSMENT & PLAN NOTE
His macrocytosis is unchanged and he has no other hematologic changes at this time.  Will continue to monitor with yearly follow up.

## 2023-11-21 DIAGNOSIS — M25.561 RIGHT KNEE PAIN, UNSPECIFIED CHRONICITY: Primary | ICD-10-CM

## 2023-11-22 ENCOUNTER — PATIENT MESSAGE (OUTPATIENT)
Dept: ORTHOPEDICS | Facility: CLINIC | Age: 73
End: 2023-11-22
Payer: COMMERCIAL

## 2023-12-07 ENCOUNTER — HOSPITAL ENCOUNTER (OUTPATIENT)
Dept: RADIOLOGY | Facility: HOSPITAL | Age: 73
Discharge: HOME | End: 2023-12-07
Attending: ORTHOPAEDIC SURGERY
Payer: COMMERCIAL

## 2023-12-07 ENCOUNTER — OFFICE VISIT (OUTPATIENT)
Dept: ORTHOPEDICS | Facility: CLINIC | Age: 73
End: 2023-12-07
Payer: COMMERCIAL

## 2023-12-07 VITALS — BODY MASS INDEX: 35.82 KG/M2 | WEIGHT: 270.31 LBS | HEIGHT: 73 IN

## 2023-12-07 DIAGNOSIS — S83.241A OTHER TEAR OF MEDIAL MENISCUS OF RIGHT KNEE AS CURRENT INJURY, INITIAL ENCOUNTER: ICD-10-CM

## 2023-12-07 DIAGNOSIS — M25.561 RIGHT KNEE PAIN, UNSPECIFIED CHRONICITY: ICD-10-CM

## 2023-12-07 DIAGNOSIS — M17.10 ARTHRITIS OF KNEE: Primary | ICD-10-CM

## 2023-12-07 PROCEDURE — 3008F PR BODY MASS INDEX (BMI) DOCUMENTED: ICD-10-PCS | Mod: CPTII,S$GLB,, | Performed by: ORTHOPAEDIC SURGERY

## 2023-12-07 PROCEDURE — 4010F PR ACE/ARB THEARPY RXD/TAKEN: ICD-10-PCS | Mod: CPTII,S$GLB,, | Performed by: ORTHOPAEDIC SURGERY

## 2023-12-07 PROCEDURE — 73564 X-RAY EXAM KNEE 4 OR MORE: CPT | Mod: 26,RT,, | Performed by: RADIOLOGY

## 2023-12-07 PROCEDURE — 99999 PR PBB SHADOW E&M-EST. PATIENT-LVL III: ICD-10-PCS | Mod: PBBFAC,,, | Performed by: ORTHOPAEDIC SURGERY

## 2023-12-07 PROCEDURE — 1159F MED LIST DOCD IN RCRD: CPT | Mod: CPTII,S$GLB,, | Performed by: ORTHOPAEDIC SURGERY

## 2023-12-07 PROCEDURE — 99999 PR PBB SHADOW E&M-EST. PATIENT-LVL III: CPT | Mod: PBBFAC,,, | Performed by: ORTHOPAEDIC SURGERY

## 2023-12-07 PROCEDURE — 73562 XR KNEE ORTHO RIGHT WITH FLEXION: ICD-10-PCS | Mod: 26,59,LT, | Performed by: RADIOLOGY

## 2023-12-07 PROCEDURE — 99204 OFFICE O/P NEW MOD 45 MIN: CPT | Mod: 25,S$GLB,, | Performed by: ORTHOPAEDIC SURGERY

## 2023-12-07 PROCEDURE — 1125F AMNT PAIN NOTED PAIN PRSNT: CPT | Mod: CPTII,S$GLB,, | Performed by: ORTHOPAEDIC SURGERY

## 2023-12-07 PROCEDURE — 1125F PR PAIN SEVERITY QUANTIFIED, PAIN PRESENT: ICD-10-PCS | Mod: CPTII,S$GLB,, | Performed by: ORTHOPAEDIC SURGERY

## 2023-12-07 PROCEDURE — 73564 XR KNEE ORTHO RIGHT WITH FLEXION: ICD-10-PCS | Mod: 26,RT,, | Performed by: RADIOLOGY

## 2023-12-07 PROCEDURE — 3008F BODY MASS INDEX DOCD: CPT | Mod: CPTII,S$GLB,, | Performed by: ORTHOPAEDIC SURGERY

## 2023-12-07 PROCEDURE — 99204 PR OFFICE/OUTPT VISIT, NEW, LEVL IV, 45-59 MIN: ICD-10-PCS | Mod: 25,S$GLB,, | Performed by: ORTHOPAEDIC SURGERY

## 2023-12-07 PROCEDURE — 4010F ACE/ARB THERAPY RXD/TAKEN: CPT | Mod: CPTII,S$GLB,, | Performed by: ORTHOPAEDIC SURGERY

## 2023-12-07 PROCEDURE — 20610 LARGE JOINT ASPIRATION/INJECTION: R KNEE: ICD-10-PCS | Mod: RT,S$GLB,, | Performed by: ORTHOPAEDIC SURGERY

## 2023-12-07 PROCEDURE — 73562 X-RAY EXAM OF KNEE 3: CPT | Mod: TC,59,PO,LT

## 2023-12-07 PROCEDURE — 20610 DRAIN/INJ JOINT/BURSA W/O US: CPT | Mod: RT,S$GLB,, | Performed by: ORTHOPAEDIC SURGERY

## 2023-12-07 PROCEDURE — 73562 X-RAY EXAM OF KNEE 3: CPT | Mod: 26,59,LT, | Performed by: RADIOLOGY

## 2023-12-07 PROCEDURE — 1159F PR MEDICATION LIST DOCUMENTED IN MEDICAL RECORD: ICD-10-PCS | Mod: CPTII,S$GLB,, | Performed by: ORTHOPAEDIC SURGERY

## 2023-12-07 RX ORDER — TRIAMCINOLONE ACETONIDE 40 MG/ML
40 INJECTION, SUSPENSION INTRA-ARTICULAR; INTRAMUSCULAR
Status: DISCONTINUED | OUTPATIENT
Start: 2023-12-07 | End: 2023-12-07 | Stop reason: HOSPADM

## 2023-12-07 RX ADMIN — TRIAMCINOLONE ACETONIDE 40 MG: 40 INJECTION, SUSPENSION INTRA-ARTICULAR; INTRAMUSCULAR at 08:12

## 2023-12-07 NOTE — PROCEDURES
Large Joint Aspiration/Injection: R knee    Date/Time: 12/7/2023 8:00 AM    Performed by: Yohan Wells MD  Authorized by: Yohan Wells MD    Consent Done?:  Yes (Verbal)  Indications:  Pain  Site marked: the procedure site was marked    Timeout: prior to procedure the correct patient, procedure, and site was verified    Local anesthetic: Ropivicaine.  Anesthetic total (ml):  3      Details:  Needle Size:  18 G  Approach:  Lateral  Location:  Knee  Site:  R knee  Medications:  40 mg triamcinolone acetonide 40 mg/mL  Aspirate amount (mL):  40  Aspirate:  Serous and yellow  Patient tolerance:  Patient tolerated the procedure well with no immediate complications

## 2023-12-07 NOTE — PROGRESS NOTES
Past Medical History:   Diagnosis Date    Arthritis     Basal cell carcinoma     Right dorsal hand    Blood clotting tendency     Cancer     skin cancer    Coronary artery disease     MI  Stent placement 3/2005    Gout     on 300 allopurinol    Heart attack     Hypertension     Joint pain     Keloid cicatrix     Kidney stone     Neoplastic syndrome     Pulmonary embolism     x 2: 2005, 2015    Renal stone     Squamous cell carcinoma 02/22/2016    Right cheek    Wears glasses        Past Surgical History:   Procedure Laterality Date    BACK SURGERY  9/2010    L4-5 fusion decompression    BONE RESECTION, HUMERUS  1969    ORIF L radial & ulnar    CYSTOSCOPY      JOINT REPLACEMENT  2009    Partial Rt shoulder    KIDNEY STONE SURGERY      KNEE ARTHROSCOPY      LEFT HEART CATHETERIZATION Left 2/20/2020    Procedure: CATHETERIZATION, HEART, LEFT (RIGHT RADIAL);  Surgeon: Cheryl Mondragon MD;  [facility] C[facility]EP [facility];  Service: Cardiology;  Laterality: Left;    LITHOTRIPSY      RADIOFREQUENCY ABLATION  2016    for chronic back pain    removal of colon polyp      Rt shoulder      Partial replacement 2008       Current Outpatient Medications   Medication Sig    allopurinoL (ZYLOPRIM) 300 MG tablet     b complex vitamins tablet Take 1 tablet by mouth once daily.    captopriL (CAPOTEN) 12.5 MG tablet TAKE 1 TABLET TWICE A DAY    cholecalciferol, vitamin D3, 125 mcg (5,000 unit) Tab 5,000 Units once daily.     levothyroxine (SYNTHROID) 75 MCG tablet Take 75 mcg by mouth every morning.    magnesium oxide (MAG-OX) 400 mg (241.3 mg magnesium) tablet Take 400 mg by mouth once daily.     methocarbamoL (ROBAXIN) 500 MG Tab Take 500 mg by mouth 3 (three) times daily as needed.    metoprolol succinate (TOPROL-XL) 25 MG 24 hr tablet Take 1 tablet (25 mg total) by mouth once daily.    multivitamin capsule Take 1 capsule by mouth once daily.    predniSONE (DELTASONE) 20 MG tablet Take 20 mg by mouth daily as needed (Gout).     rivaroxaban (XARELTO) 15 mg Tab Take 1 tablet (15 mg total) by mouth once daily.    simvastatin (ZOCOR) 10 MG tablet Take 1 tablet (10 mg total) by mouth every evening.    tadalafil (CIALIS) 20 MG Tab Take 20 mg by mouth daily as needed.     tramadol (ULTRAM) 50 mg tablet Take 50 mg by mouth every 8 (eight) hours as needed.      No current facility-administered medications for this visit.       Review of patient's allergies indicates:   Allergen Reactions    Penicillins Hives       Family History   Problem Relation Age of Onset    Nephrolithiasis Father     Heart attack Father     Arthritis Mother     Ulcers Mother     Deep vein thrombosis Brother     Pulmonary embolism Brother     Heart attack Brother     Melanoma Neg Hx     Psoriasis Neg Hx     Lupus Neg Hx     Eczema Neg Hx     Acne Neg Hx        Social History     Socioeconomic History    Marital status:    Occupational History    Occupation: Fire Protection   Tobacco Use    Smoking status: Never    Smokeless tobacco: Never   Substance and Sexual Activity    Alcohol use: Yes     Alcohol/week: 1.0 standard drink of alcohol     Types: 1 Cans of beer per week     Comment: Socially    Drug use: No    Sexual activity: Yes     Partners: Female       Chief Complaint: No chief complaint on file.      History of present illness:  73-year-old male seen for right knee pain that started back in September when he was cycling with his grandson up in [place].  Started to have some swelling and pain along the medial aspect of his knee.  Patient has difficulty bending or cycling now.  Pain is a 4/10.      Review of Systems:    Constitution: Negative for chills, fever, and sweats.  Negative for unexplained weight loss.    HENT:  Negative for headaches and blurry vision.    Cardiovascular:Negative for chest pain or irregular heart beat. Negative for hypertension.    Respiratory:  Negative for cough and shortness of breath.    Gastrointestinal: Negative for abdominal pain,  heartburn, melena, nausea, and vomitting.    Genitourinary:  Negative bladder incontinence and dysuria.    Musculoskeletal:  See HPI    Neurological: Negative for numbness.    Psychiatric/Behavioral: Negative for depression.  The patient is not nervous/anxious.      Endocrine: Negative for polyuria    Hematologic/Lymphatic: Negative for bleeding problem.  Does not bruise/bleed easily.    Skin: Negative for poor would healing and rash      Physical Examination:    Vital Signs:  There were no vitals filed for this visit.    There is no height or weight on file to calculate BMI.    This a well-developed, well nourished patient in no acute distress.  They are alert and oriented and cooperative to examination.  Pt. walks without an antalgic gait.      Examination of the right knee shows no rashes or erythema. There are no masses ecchymosis And smalleffusion. Patient has full range of motion from 0-130°. Patient is nontender to palpation over lateral joint line and moderately tender to palpation over the medial joint line. Patient has a - Lachman exam, - anterior drawer exam, and - posterior drawer exam. - Apley exam. Knee is stable to varus and valgus stress. 5 out of 5 motor strength. Palpable distal pulses. Intact light touch sensation. Negative Patellofemoral crepitus      X-rays:  X-rays of the right knee are ordered and reviewed which show some mild medial joint space narrowing of the right knee and more moderate to severe medial narrowing of the left knee.  Small right joint effusion     Assessment::  Right knee arthritis versus medial meniscal tear with effusion    Plan:  I reviewed the finding with him today.  Recommended aspiration and injection of his right knee.  Talked about possibly getting an MRI if the knee continues to bother him.  I gave him a knee conditioning guide we also discussed his exercise routine.  Follow up as needed.    All previous pertinent notes including ER visits, physical therapy visits,  other orthopedic visits as well as other care for the same musculoskeletal problem were reviewed.  All pertinent lab values and previous imaging was reviewed pertinent to the current visit.    This note was created using [employer] voice recognition software that occasionally misinterpreted phrases or words.    Consult note is delivered via Epic messaging service.

## 2024-01-24 RX ORDER — ENOXAPARIN SODIUM 100 MG/ML
40 INJECTION SUBCUTANEOUS DAILY
Qty: 2 EACH | Refills: 0 | Status: SHIPPED | OUTPATIENT
Start: 2024-01-24

## 2024-01-24 NOTE — TELEPHONE ENCOUNTER
Notified by [facility] that patient will be having an upcoming tooth extraction and requesting recommendations regarding the patient's blood thinner. Reviewed with MILAGRO Valdes and per her verbal order, patient to hold xarelto for 2 days prior to tooth extraction. Patient to take lovenox daily in place of xarelto for the 2 days xarelto is held. Patient to not take any blood thinners the day of extraction. Patient may resume xarelto as prescribed the day after tooth extraction as long as there is no bleeding or complications, which should be assessed for by the surgeon completing the procedure. Patient made aware of above and verbalized understanding. Confirmed pharmacy with patient and prescription pended to Madalyn to review and sign.

## 2024-05-02 ENCOUNTER — PATIENT MESSAGE (OUTPATIENT)
Dept: HEMATOLOGY/ONCOLOGY | Facility: CLINIC | Age: 74
End: 2024-05-02

## 2024-05-02 ENCOUNTER — TELEPHONE (OUTPATIENT)
Dept: HEMATOLOGY/ONCOLOGY | Facility: CLINIC | Age: 74
End: 2024-05-02

## 2024-05-02 DIAGNOSIS — D75.89 MACROCYTOSIS WITHOUT ANEMIA: Primary | ICD-10-CM

## 2024-06-04 LAB
ALBUMIN SERPL-MCNC: 3.8 G/DL (ref 3.6–5.1)
ALBUMIN/GLOB SERPL: 1.3 (CALC) (ref 1–2.5)
ALP SERPL-CCNC: 67 U/L (ref 35–144)
ALT SERPL-CCNC: 22 U/L (ref 9–46)
AST SERPL-CCNC: 22 U/L (ref 10–35)
BASOPHILS # BLD AUTO: 42 CELLS/UL (ref 0–200)
BASOPHILS NFR BLD AUTO: 0.8 %
BILIRUB SERPL-MCNC: 0.6 MG/DL (ref 0.2–1.2)
BUN SERPL-MCNC: 14 MG/DL (ref 7–25)
BUN/CREAT SERPL: ABNORMAL (CALC) (ref 6–22)
CALCIUM SERPL-MCNC: 9.2 MG/DL (ref 8.6–10.3)
CHLORIDE SERPL-SCNC: 105 MMOL/L (ref 98–110)
CO2 SERPL-SCNC: 20 MMOL/L (ref 20–32)
CREAT SERPL-MCNC: 0.79 MG/DL (ref 0.7–1.28)
EGFR: 94 ML/MIN/1.73M2
EOSINOPHIL # BLD AUTO: 101 CELLS/UL (ref 15–500)
EOSINOPHIL NFR BLD AUTO: 1.9 %
ERYTHROCYTE [DISTWIDTH] IN BLOOD BY AUTOMATED COUNT: 13.1 % (ref 11–15)
GLOBULIN SER CALC-MCNC: 3 G/DL (CALC) (ref 1.9–3.7)
GLUCOSE SERPL-MCNC: 130 MG/DL (ref 65–99)
HCT VFR BLD AUTO: 46.4 % (ref 38.5–50)
HGB BLD-MCNC: 15.9 G/DL (ref 13.2–17.1)
LYMPHOCYTES # BLD AUTO: 1723 CELLS/UL (ref 850–3900)
LYMPHOCYTES NFR BLD AUTO: 32.5 %
MCH RBC QN AUTO: 34.9 PG (ref 27–33)
MCHC RBC AUTO-ENTMCNC: 34.3 G/DL (ref 32–36)
MCV RBC AUTO: 101.8 FL (ref 80–100)
MONOCYTES # BLD AUTO: 440 CELLS/UL (ref 200–950)
MONOCYTES NFR BLD AUTO: 8.3 %
NEUTROPHILS # BLD AUTO: 2995 CELLS/UL (ref 1500–7800)
NEUTROPHILS NFR BLD AUTO: 56.5 %
PLATELET # BLD AUTO: 172 THOUSAND/UL (ref 140–400)
PMV BLD REES-ECKER: 11.2 FL (ref 7.5–12.5)
POTASSIUM SERPL-SCNC: 4.2 MMOL/L (ref 3.5–5.3)
PROT SERPL-MCNC: 6.8 G/DL (ref 6.1–8.1)
RBC # BLD AUTO: 4.56 MILLION/UL (ref 4.2–5.8)
SODIUM SERPL-SCNC: 137 MMOL/L (ref 135–146)
WBC # BLD AUTO: 5.3 THOUSAND/UL (ref 3.8–10.8)

## 2024-06-07 RX ORDER — CAPTOPRIL 12.5 MG/1
TABLET ORAL
Qty: 180 TABLET | Refills: 3 | Status: SHIPPED | OUTPATIENT
Start: 2024-06-07

## 2024-06-10 ENCOUNTER — OFFICE VISIT (OUTPATIENT)
Facility: CLINIC | Age: 74
End: 2024-06-10
Payer: COMMERCIAL

## 2024-06-10 VITALS
HEART RATE: 62 BPM | BODY MASS INDEX: 35.19 KG/M2 | WEIGHT: 266.69 LBS | TEMPERATURE: 99 F | SYSTOLIC BLOOD PRESSURE: 119 MMHG | DIASTOLIC BLOOD PRESSURE: 59 MMHG | RESPIRATION RATE: 18 BRPM

## 2024-06-10 DIAGNOSIS — Z86.711 HISTORY OF PULMONARY EMBOLUS (PE): Primary | Chronic | ICD-10-CM

## 2024-06-10 PROCEDURE — 99213 OFFICE O/P EST LOW 20 MIN: CPT | Mod: S$GLB,,, | Performed by: INTERNAL MEDICINE

## 2024-06-10 PROCEDURE — 1101F PT FALLS ASSESS-DOCD LE1/YR: CPT | Mod: CPTII,S$GLB,, | Performed by: INTERNAL MEDICINE

## 2024-06-10 PROCEDURE — 1159F MED LIST DOCD IN RCRD: CPT | Mod: CPTII,S$GLB,, | Performed by: INTERNAL MEDICINE

## 2024-06-10 PROCEDURE — 3008F BODY MASS INDEX DOCD: CPT | Mod: CPTII,S$GLB,, | Performed by: INTERNAL MEDICINE

## 2024-06-10 PROCEDURE — 3074F SYST BP LT 130 MM HG: CPT | Mod: CPTII,S$GLB,, | Performed by: INTERNAL MEDICINE

## 2024-06-10 PROCEDURE — 3288F FALL RISK ASSESSMENT DOCD: CPT | Mod: CPTII,S$GLB,, | Performed by: INTERNAL MEDICINE

## 2024-06-10 PROCEDURE — 4010F ACE/ARB THERAPY RXD/TAKEN: CPT | Mod: CPTII,S$GLB,, | Performed by: INTERNAL MEDICINE

## 2024-06-10 PROCEDURE — 99999 PR PBB SHADOW E&M-EST. PATIENT-LVL III: CPT | Mod: PBBFAC,,, | Performed by: INTERNAL MEDICINE

## 2024-06-10 PROCEDURE — 3078F DIAST BP <80 MM HG: CPT | Mod: CPTII,S$GLB,, | Performed by: INTERNAL MEDICINE

## 2024-06-10 PROCEDURE — 1125F AMNT PAIN NOTED PAIN PRSNT: CPT | Mod: CPTII,S$GLB,, | Performed by: INTERNAL MEDICINE

## 2024-06-10 NOTE — ASSESSMENT & PLAN NOTE
Patient is doing well and he remains on xarelto 15mg.  No new clotting issues and will continue to see him on a yearly basis.  Discussed this with him today.

## 2024-06-10 NOTE — PROGRESS NOTES
PROGRESS NOTE    Subjective:       Patient ID: Matt Rae is a 74 y.o. male.    Chief Complaint:  No chief complaint on file.  Hypercoag state,(ACL) prior PE.      History of Present Illness:   Matt Rae is a 74 y.o. male who presents with a history of hypercoag state and macrocytosis.      Patient has no new complaints at this time.    Continues to have back issues.      Continues on Xarelto 15mg daily as of today, 6/10/2024.   He is doing ok on this currently. No bleeding problems.       back surgery 5/3/2021 and required a second surgery as well.    Patient had 3 coronary stents placed Feb 2020.         Family and Social history reviewed and is unchanged from 12/9/2014.      ROS:  Review of Systems   Constitutional:  Negative for appetite change, fever and unexpected weight change.   HENT:  Negative for mouth sores and nosebleeds.    Eyes:  Negative for visual disturbance.   Respiratory:  Negative for cough, chest tightness and shortness of breath.    Cardiovascular:  Negative for chest pain.   Gastrointestinal:  Positive for diarrhea. Negative for abdominal pain and blood in stool.   Genitourinary:  Positive for frequency. Negative for hematuria.   Musculoskeletal:  Positive for back pain.   Skin:  Negative for rash.   Neurological:  Positive for headaches.   Hematological:  Negative for adenopathy. Does not bruise/bleed easily.   Psychiatric/Behavioral:  The patient is nervous/anxious.           Current Outpatient Medications:     allopurinoL (ZYLOPRIM) 300 MG tablet, , Disp: , Rfl:     b complex vitamins tablet, Take 1 tablet by mouth once daily., Disp: , Rfl:     captopriL (CAPOTEN) 12.5 MG tablet, TAKE 1 TABLET TWICE A DAY, Disp: 180 tablet, Rfl: 3    cholecalciferol, vitamin D3, 125 mcg (5,000 unit) Tab, 5,000 Units once daily. , Disp: , Rfl:     enoxaparin (LOVENOX) 40 mg/0.4 mL Syrg, Inject 0.4 mLs (40 mg total) into the skin once daily. DO  NOT TAKE XARELTO WHILE TAKING LOVENOX., Disp: 2 each, Rfl: 0    levothyroxine (SYNTHROID) 75 MCG tablet, Take 75 mcg by mouth every morning., Disp: , Rfl:     magnesium oxide (MAG-OX) 400 mg (241.3 mg magnesium) tablet, Take 400 mg by mouth once daily. , Disp: , Rfl:     methocarbamoL (ROBAXIN) 500 MG Tab, Take 500 mg by mouth 3 (three) times daily as needed., Disp: , Rfl:     metoprolol succinate (TOPROL-XL) 25 MG 24 hr tablet, Take 1 tablet (25 mg total) by mouth once daily., Disp: 90 tablet, Rfl: 3    multivitamin capsule, Take 1 capsule by mouth once daily., Disp: , Rfl:     predniSONE (DELTASONE) 20 MG tablet, Take 20 mg by mouth daily as needed (Gout)., Disp: , Rfl:     rivaroxaban (XARELTO) 15 mg Tab, Take 1 tablet (15 mg total) by mouth once daily., Disp: 30 tablet, Rfl: 1    simvastatin (ZOCOR) 10 MG tablet, Take 1 tablet (10 mg total) by mouth every evening., Disp: 90 tablet, Rfl: 3    tadalafil (CIALIS) 20 MG Tab, Take 20 mg by mouth daily as needed. , Disp: , Rfl:     tramadol (ULTRAM) 50 mg tablet, Take 50 mg by mouth every 8 (eight) hours as needed. , Disp: , Rfl:         Objective:       Physical Examination:     BP (!) 119/59   Pulse 62   Temp 98.6 °F (37 °C)   Resp 18   Wt 121 kg (266 lb 11.2 oz)   BMI 35.19 kg/m²     Physical Exam  Vitals reviewed.   Constitutional:       Appearance: He is well-developed.   HENT:      Head: Normocephalic and atraumatic.      Right Ear: External ear normal.      Left Ear: External ear normal.   Eyes:      General: No scleral icterus.     Conjunctiva/sclera: Conjunctivae normal.      Pupils: Pupils are equal, round, and reactive to light.   Cardiovascular:      Rate and Rhythm: Normal rate and regular rhythm.      Heart sounds: Normal heart sounds. No murmur heard.     No friction rub. No gallop.   Pulmonary:      Effort: Pulmonary effort is normal. No respiratory distress.      Breath sounds: Normal breath sounds. No rales.   Chest:      Chest wall: No  tenderness.   Abdominal:      General: Bowel sounds are normal. There is no distension.      Palpations: Abdomen is soft. There is no mass.      Tenderness: There is no abdominal tenderness. There is no guarding or rebound.   Musculoskeletal:      Cervical back: Normal range of motion and neck supple.   Lymphadenopathy:      Head:      Right side of head: No tonsillar adenopathy.      Left side of head: No tonsillar adenopathy.      Cervical: No cervical adenopathy.      Upper Body:      Right upper body: No supraclavicular adenopathy.      Left upper body: No supraclavicular adenopathy.   Neurological:      Mental Status: He is alert and oriented to person, place, and time.   Psychiatric:         Behavior: Behavior normal.         Thought Content: Thought content normal.         Judgment: Judgment normal.         Labs:   Recent Results (from the past 336 hour(s))   CBC Auto Differential    Collection Time: 06/03/24  7:35 AM   Result Value Ref Range    WBC 5.3 3.8 - 10.8 Thousand/uL    Hemoglobin 15.9 13.2 - 17.1 g/dL    Hematocrit 46.4 38.5 - 50.0 %    Platelets 172 140 - 400 Thousand/uL     CMP  Sodium   Date Value Ref Range Status   06/03/2024 137 135 - 146 mmol/L Final   03/07/2019 139 134 - 144 mmol/L      Potassium   Date Value Ref Range Status   06/03/2024 4.2 3.5 - 5.3 mmol/L Final     Chloride   Date Value Ref Range Status   06/03/2024 105 98 - 110 mmol/L Final   03/07/2019 106 98 - 110 mmol/L      CO2   Date Value Ref Range Status   06/03/2024 20 20 - 32 mmol/L Final     Glucose   Date Value Ref Range Status   06/03/2024 130 (H) 65 - 99 mg/dL Final     Comment:                   Fasting reference interval     For someone without known diabetes, a glucose  value >125 mg/dL indicates that they may have  diabetes and this should be confirmed with a  follow-up test.        03/07/2019 125 (H) 70 - 99 mg/dL      BUN   Date Value Ref Range Status   06/03/2024 14 7 - 25 mg/dL Final     Creatinine   Date Value Ref  "Range Status   06/03/2024 0.79 0.70 - 1.28 mg/dL Final   03/07/2019 0.99 0.60 - 1.40 mg/dL    01/27/2013 1.0 0.5 - 1.4 mg/dL Final     Calcium   Date Value Ref Range Status   06/03/2024 9.2 8.6 - 10.3 mg/dL Final   01/27/2013 9.5 8.7 - 10.5 mg/dL Final     Total Protein   Date Value Ref Range Status   06/03/2024 6.8 6.1 - 8.1 g/dL Final     Albumin   Date Value Ref Range Status   06/03/2024 3.8 3.6 - 5.1 g/dL Final   03/07/2019 3.6 3.1 - 4.7 g/dL      Total Bilirubin   Date Value Ref Range Status   06/03/2024 0.6 0.2 - 1.2 mg/dL Final     Alkaline Phosphatase   Date Value Ref Range Status   01/18/2023 60 55 - 135 U/L Final   01/27/2013 63 55 - 135 U/L Final     AST ([place])   Date Value Ref Range Status   03/09/2016 29 17 - 59 U/L Final     AST   Date Value Ref Range Status   06/03/2024 22 10 - 35 U/L Final     ALT   Date Value Ref Range Status   06/03/2024 22 9 - 46 U/L Final     Anion Gap   Date Value Ref Range Status   01/18/2023 7 (L) 8 - 16 mmol/L Final   01/27/2013 13 5 - 15 meq/L Final     eGFR if    Date Value Ref Range Status   06/06/2022 92 > OR = 60 mL/min/1.73m2 Final     eGFR if non    Date Value Ref Range Status   06/06/2022 80 > OR = 60 mL/min/1.73m2 Final     No results found for: "CEA"  Lab Results   Component Value Date    PSA 1.0 01/26/2016    PSA 0.57 05/14/2010    PSA 0.8 06/20/2008           Assessment/Plan:   History of pulmonary embolus (PE)  Patient is doing well and he remains on xarelto 15mg.  No new clotting issues and will continue to see him on a yearly basis.  Discussed this with him today.        Discussion:     Follow up in about 1 year (around 6/10/2025).      Electronically signed by Silvestre Albert              "

## 2024-12-06 ENCOUNTER — TELEPHONE (OUTPATIENT)
Dept: OTOLARYNGOLOGY | Facility: CLINIC | Age: 74
End: 2024-12-06
Payer: COMMERCIAL

## 2024-12-06 DIAGNOSIS — Z86.711 HISTORY OF PULMONARY EMBOLUS (PE): Primary | ICD-10-CM

## 2024-12-06 DIAGNOSIS — R04.0 BLEEDING FROM THE NOSE: ICD-10-CM

## 2024-12-06 NOTE — TELEPHONE ENCOUNTER
Spoke w/pt per secure chat msg received from Madalyn GUARDADOC and Kalyan Simon PA-C for scheduling of ENT appt for epistaxis.  Pt scheduled to see Dr. Saul on 12/10 @ 0940.  Pt confirmed appt date/time; Pt has no further ENT questions/concerns at this time.

## 2024-12-06 NOTE — TELEPHONE ENCOUNTER
Called ad spoke with pt. Wife states husbands needed a EKG. Not sure who has to order this to be done. She states  staying at [facility]. I called [facility] spoke with xi [phone], asked about order for EKG, she was not sure about an ekg order needed , I informed her an ekg was done on 4/10/19. She states she will look into this and call us back      Spoke with the patient and he is having increased nose bleeding daily using Afrin. Patient started taking Xarelto 15mg QOD. Discussed with patient we will decrease to Xarelto 10mg daily and have him see ENT> Patient notified if not stopping over te weekend to go to the ER.

## 2024-12-10 ENCOUNTER — OFFICE VISIT (OUTPATIENT)
Dept: OTOLARYNGOLOGY | Facility: CLINIC | Age: 74
End: 2024-12-10
Payer: COMMERCIAL

## 2024-12-10 VITALS
SYSTOLIC BLOOD PRESSURE: 144 MMHG | HEIGHT: 73 IN | BODY MASS INDEX: 36.32 KG/M2 | DIASTOLIC BLOOD PRESSURE: 81 MMHG | HEART RATE: 58 BPM | WEIGHT: 274.06 LBS

## 2024-12-10 DIAGNOSIS — Z86.711 HISTORY OF PULMONARY EMBOLUS (PE): ICD-10-CM

## 2024-12-10 DIAGNOSIS — R04.0 EPISTAXIS: ICD-10-CM

## 2024-12-10 DIAGNOSIS — Z79.01 ANTICOAGULANT LONG-TERM USE: Primary | ICD-10-CM

## 2024-12-10 PROCEDURE — 3079F DIAST BP 80-89 MM HG: CPT | Mod: CPTII,S$GLB,, | Performed by: OTOLARYNGOLOGY

## 2024-12-10 PROCEDURE — 1101F PT FALLS ASSESS-DOCD LE1/YR: CPT | Mod: CPTII,S$GLB,, | Performed by: OTOLARYNGOLOGY

## 2024-12-10 PROCEDURE — 99203 OFFICE O/P NEW LOW 30 MIN: CPT | Mod: 25,S$GLB,, | Performed by: OTOLARYNGOLOGY

## 2024-12-10 PROCEDURE — 30901 CONTROL OF NOSEBLEED: CPT | Mod: LT,S$GLB,, | Performed by: OTOLARYNGOLOGY

## 2024-12-10 PROCEDURE — 4010F ACE/ARB THERAPY RXD/TAKEN: CPT | Mod: CPTII,S$GLB,, | Performed by: OTOLARYNGOLOGY

## 2024-12-10 PROCEDURE — 99999 PR PBB SHADOW E&M-EST. PATIENT-LVL V: CPT | Mod: PBBFAC,,, | Performed by: OTOLARYNGOLOGY

## 2024-12-10 PROCEDURE — 3077F SYST BP >= 140 MM HG: CPT | Mod: CPTII,S$GLB,, | Performed by: OTOLARYNGOLOGY

## 2024-12-10 PROCEDURE — 1159F MED LIST DOCD IN RCRD: CPT | Mod: CPTII,S$GLB,, | Performed by: OTOLARYNGOLOGY

## 2024-12-10 PROCEDURE — 3288F FALL RISK ASSESSMENT DOCD: CPT | Mod: CPTII,S$GLB,, | Performed by: OTOLARYNGOLOGY

## 2024-12-10 PROCEDURE — 3008F BODY MASS INDEX DOCD: CPT | Mod: CPTII,S$GLB,, | Performed by: OTOLARYNGOLOGY

## 2024-12-10 NOTE — PATIENT INSTRUCTIONS
"NASAL CAUTERY AFTERCARE    Use lots of saline throughout the day to keep the nose moist.  Consider using saline gel for more long-term moisture  Aquaphor or Vaseline should be applied to the nostrils there were cauterized if not on both sides at least at bedtime sometimes several times daily if instructed to facilitate healing.  Minimal bleeding after cauterization is not atypical but this should continue to improve and resolve.  Follow-up for repeat cauterization or further evaluation treatment if symptoms of bleeding are not resolved.    HOW TO STOP NOSE BLEEDING    If bleeding does recur pinched the nose fully shut the entire soft portion of the nose.  Sit upright with the chin down and spit out any blood.  Afrin decongestant nasal spray (oxymetazoline) can be used to further decongest the nose and stop bleeding if direct pressure as above is not sufficient. OTC topicals like "Nasal Cease", a product made from algae can help with clotting as well.  Try NOT to stuff tissue or cotton in the nose as it may cause additional trauma and displace a clot on removal restarting bleeding. Go straight to the ER if bleeding is uncontrollable with those measures outlined.    DRY NOSE CARE    Use lots of saline throughout the day to keep the nose moist.  Consider using saline gel for longer-term moisturizing.  Aquaphor or Vaseline should be applied to the nostrils at night when needed for crusting/more severe dryness. Antibiotic ointment can be used up to a week for tender dryness/crusting.  Follow-up for further evaluation treatment if symptoms of are not resolved.      Return with any worsening of symptoms, failure to improve, or any other concerns for further evaluation and treatment.      Voice recognition software was used in the creation of this note/communication and any sound-alike errors which may have occurred from its use should be taken in context when interpreting.  If such errors prevent a clear understanding of " the note/communication, please contact the office for clarification.

## 2024-12-10 NOTE — PROGRESS NOTES
[facility]    Subjective:      Patient: Matt Rae Patient PCP: Umesh Cornejo MD         :  1950     Sex:  male      MRN:  [id number]          Date of Visit: 12/10/2024      Chief Complaint: Epistaxis (Had 2nd PE 8 yrs ago, got placed on Xarelto 20 mg. States got severe nosebleeds on 20mg, states that dose was decreased to 15 mg nightly. States that the day before Thanksgiving this year, brushing teeth, nose started bleeding from the left nostril. Used Afrin as directed and used tissue to pack. States that when he removed the tissue, there was a huge clot. States that he sprayed Afrin and about an hour after it finally stopped. States had bleeding again over the week. Had small bleed yesterday. )      Patient ID: Matt Rae is a 74 y.o. male     Patient is a  lifelong NON-smoker with a past medical history of anticardiolipin syndrome with recurrent PE on Xarelto and previously on Plavix with a history of CAD status post stent referred to me by Madalyn Matias in consultation for  recurrent epistaxis. No other bleeding concerns.  No melena.  No nasal pain or nasal obstructive symptoms. Notes from hematology oncology nurse practitioner reflect concern for bleeding on Xarelto 15 mg QOD with recommendation to decreased to Xarelto 10 mg daily.    No head imaging.      No recent CBC.  Normal hemoglobin in .  No INR since       Labs:    Lab Results   Component Value Date    INR 1.2 2021    INR 1.6 2020    INR 1.1 2016     WBC   Date Value Ref Range Status   2024 5.3 3.8 - 10.8 Thousand/uL Final     Hemoglobin   Date Value Ref Range Status   2024 15.9 13.2 - 17.1 g/dL Final     Platelets   Date Value Ref Range Status   2024 172 140 - 400 Thousand/uL Final     Creatinine   Date Value Ref Range Status   2024 0.79 0.70 - 1.28 mg/dL Final       Past Medical History  He has a past medical history of Arthritis, Basal cell carcinoma, Blood clotting tendency,  Cancer, Coronary artery disease, Gout, Heart attack, Hypertension, Joint pain, Keloid cicatrix, Kidney stone, Neoplastic syndrome, Pulmonary embolism, Renal stone, Squamous cell carcinoma, and Wears glasses.    Family / Surgical / Social History  His family history includes Arthritis in his mother; Deep vein thrombosis in his brother; Heart attack in his brother and father; Nephrolithiasis in his father; Pulmonary embolism in his brother; Ulcers in his mother.    Past Surgical History:   Procedure Laterality Date    BACK SURGERY  9/2010    L4-5 fusion decompression    BONE RESECTION, HUMERUS  1969    ORIF L radial & ulnar    CYSTOSCOPY      JOINT REPLACEMENT  2009    Partial Rt shoulder    KIDNEY STONE SURGERY      KNEE ARTHROSCOPY      LEFT HEART CATHETERIZATION Left 2/20/2020    Procedure: CATHETERIZATION, HEART, LEFT (RIGHT RADIAL);  Surgeon: Cheryl Mondragon MD;  Location: [facility] CATH/EP LAB;  Service: Cardiology;  Laterality: Left;    LITHOTRIPSY      RADIOFREQUENCY ABLATION  2016    for chronic back pain    removal of colon polyp      Rt shoulder      Partial replacement 2008       Social History     Tobacco Use    Smoking status: Never    Smokeless tobacco: Never   Substance and Sexual Activity    Alcohol use: Yes     Alcohol/week: 1.0 standard drink of alcohol     Types: 1 Cans of beer per week     Comment: Socially    Drug use: No    Sexual activity: Yes     Partners: Female       Medications  He has a current medication list which includes the following prescription(s): allopurinol, b complex vitamins, captopril, cholecalciferol (vitamin d3), levothyroxine, methocarbamol, metoprolol succinate, multivitamin, prednisone, rivaroxaban, simvastatin, tadalafil, tramadol, hydrocodone-acetaminophen, hydrocortisone, magnesium oxide, and metoprolol vasquez-hydrochlorothiaz.      Allergies  Review of patient's allergies indicates:   Allergen Reactions    Penicillins Hives and Other (See Comments)       All medications,  "allergies, and past history have been reviewed.    Objective:      Vitals:      6/10/2024     8:20 AM 7/17/2024    10:02 AM 12/10/2024     9:02 AM   Vitals - 1 value per visit   SYSTOLIC 119 134 144   DIASTOLIC 59 82 81   Pulse 62 58 58   Temp 98.6 °F (37 °C)     Resp 18     Weight (lb) 266.7 271.8 274.03   Weight (kg) 120.974 123.288 124.3   Height  6' 1" (1.854 m) 6' 1" (1.854 m)   BMI (Calculated)  35.9 36.2   Pain Score Two Zero        Body surface area is 2.53 meters squared.    Physical Exam:    GENERAL  APPEARANCE -  alert, appears stated age, cooperative, and moderately obese  BARRIER(S) TO COMMUNICATION -  none VOICE - appropriate for age and gender    INTEGUMENTARY  no suspicious head and neck lesions    HEENT  HEAD: Normocephalic, without obvious abnormality, atraumatic  FACE: INSPECTION - Symmetric, no signs of trauma, no suspicious lesion(s)      STRENGTH - facial symmetry intact     PALPATION -  No masses     SALIVARY GLANDS - non-tender with no appreciable mass    NECK/THYROID: normal atraumatic, no neck masses, normal thyroid, no jvd    EYES  Normal occular alignment and mobility with no visible nystagmus at rest    EARS/NOSE/MOUTH/THROAT  EARS  PINNAE AND EXTERNAL EARS - no suspicious lesion OTOSCOPIC EXAM (surgical microscopy was not used for visualization/instrumentation): EAR EXAM - Normal ear canals, tympanic membranes and mobility, and middle ear spaces bilaterally.  HEARING - grossly intact to voice/finger rub    NOSE AND SINUSES  EXTERNAL NOSE - Grossly normal for age/sex  SEPTUM - slight end on vessel white bump caudal inferior Little's area on left only.  No other abnormal findings TURBINATES - within normal limits MUCOSA - within normal limits     MOUTH AND THROAT   ORAL CAVITY, LIPS, TEETH, GUMS & TONGUE - moist, no suspicious lesions  OROPHARYNX /TONSILS/PHARYNGEAL WALLS/HYPOPHARYNX - no erythema or exudates  NASOPHARYNX - limited mirror exam - unable to visualize due to " anatomy/gag  LARYNX -  - limited mirror exam - unable to visualize due to anatomy/gag      CHEST AND LUNG   INSPECTION & AUSCULTATION - normal effort, no stridor    CARDIOVASCULAR  AUSCULTATION & PERIPHERAL VASCULAR - regular rate and rhythm.    NEUROLOGIC  MENTAL STATUS - alert, interactive CRANIAL NERVES - normal    LYMPHATIC  HEAD AND NECK - non-palpable; SUPRACLAVICULAR - deferred      Procedure(s):  Nasal Cautery (LEFT).  See procedure note.          Assessment:      Problem List Items Addressed This Visit          Hematology    History of pulmonary embolus (PE) (Chronic)     Other Visit Diagnoses       Anticoagulant long-term use    -  Primary    Epistaxis                     Plan:      Focal cautery of end on vessel left lower caudal Little's area w/o bleeding.  Aftercare instructions.  Xarelto dosing per Hematology and Cardiology.    Chin strap and maximum humidity on CPAP recommended.    Follow up as needed.          Voice recognition software was used in the creation of this note/communication and any sound-alike errors which may have occurred from its use should be taken in context when interpreting.  If such errors prevent a clear understanding of the note/communication, please contact the office for clarification.

## 2024-12-10 NOTE — PROCEDURES
Procedure Note    Pre-operative Diagnosis: Epistaxsis    Post-operative Diagnosis: same    Anesthesia: Afrin spray and 4% topical lidocaine on cotton    Method:  Nasal Cautery with silver nitrate    Side:  left    Procedure Details:    The nasal cavity was anesthetized/decongested as above.  After waiting the appropriate time the cotton was removed from the nasal cavity.  The bleeding site was identified and cauterized.  Mupirocin ointment was applied. There was excellent hemostasis at the end of the cautery procedure.    Findings: see exam note    Condition:  Stable.  Patient tolerated procedure well.    Complications:  None

## 2024-12-31 DIAGNOSIS — Z86.711 HISTORY OF PULMONARY EMBOLUS (PE): ICD-10-CM

## 2024-12-31 NOTE — TELEPHONE ENCOUNTER
Called patient on regard to advice to continue Xarelto 10 mg. Patient stated his cardiologist prescribed 15 mg and it was changed to 10 mg after uncontrollable epistaxis, no new epistaxis at this time. Per Madalyn Matias NP-C patient to continue 10 mg and consult with cardiologist to decide if increase in dosis is required appointment with cardiology 11/22/2025. Patient stated understanding.

## 2025-01-02 ENCOUNTER — OFFICE VISIT (OUTPATIENT)
Dept: OTOLARYNGOLOGY | Facility: CLINIC | Age: 75
End: 2025-01-02
Payer: COMMERCIAL

## 2025-01-02 VITALS — SYSTOLIC BLOOD PRESSURE: 159 MMHG | DIASTOLIC BLOOD PRESSURE: 79 MMHG

## 2025-01-02 DIAGNOSIS — Z79.01 ANTICOAGULANT LONG-TERM USE: ICD-10-CM

## 2025-01-02 DIAGNOSIS — Z86.711 HISTORY OF PULMONARY EMBOLUS (PE): ICD-10-CM

## 2025-01-02 DIAGNOSIS — R04.0 EPISTAXIS: Primary | ICD-10-CM

## 2025-01-02 PROCEDURE — 99999 PR PBB SHADOW E&M-EST. PATIENT-LVL IV: CPT | Mod: PBBFAC,,, | Performed by: OTOLARYNGOLOGY

## 2025-01-02 NOTE — PATIENT INSTRUCTIONS
"NASAL CAUTERY AFTERCARE    Use lots of saline throughout the day to keep the nose moist.  Consider using saline gel for more long-term moisture  Aquaphor or Vaseline should be applied to the nostrils there were cauterized if not on both sides at least at bedtime sometimes several times daily if instructed to facilitate healing.  Minimal bleeding after cauterization is not atypical but this should continue to improve and resolve.  Follow-up for repeat cauterization or further evaluation treatment if symptoms of bleeding are not resolved.    HOW TO STOP NOSE BLEEDING    If bleeding does recur pinched the nose fully shut the entire soft portion of the nose.  Sit upright with the chin down and spit out any blood.  Afrin decongestant nasal spray (oxymetazoline) can be used to further decongest the nose and stop bleeding if direct pressure as above is not sufficient. OTC topicals like "Nasal Cease", a product made from algae can help with clotting as well.  Try NOT to stuff tissue or cotton in the nose as it may cause additional trauma and displace a clot on removal restarting bleeding. Go straight to the ER if bleeding is uncontrollable with those measures outlined.    DRY NOSE CARE    Use lots of saline throughout the day to keep the nose moist.  Consider using saline gel for longer-term moisturizing.  Aquaphor or Vaseline should be applied to the nostrils at night when needed for crusting/more severe dryness. Antibiotic ointment can be used up to a week for tender dryness/crusting.  Follow-up for further evaluation treatment if symptoms of are not resolved.    Return with any worsening of symptoms, failure to improve, or any other concerns for further evaluation and treatment.      Voice recognition software was used in the creation of this note/communication and any sound-alike errors which may have occurred from its use should be taken in context when interpreting.  If such errors prevent a clear understanding of the " note/communication, please contact the office for clarification.

## 2025-01-02 NOTE — PROGRESS NOTES
[facility]    Subjective:      Patient: Matt Rae Patient PCP: Umesh Cornejo MD         :  1950     Sex:  male      MRN:  [id number]          Date of Visit: 2025      Chief Complaint: Epistaxis (Pt had a left nosebleed on New Years Miracle for 15 minutes. Pt states constant blood flow no clots./Pt stated he used Afrin and held nose for 15 minutes and it finally stopped after 15 minutes.)      Patient ID: Matt Rae is a 74 y.o. male     2025 established Patient visit for epistaxis.  Left cautery last month. Recurrence 24 again on left.  Much less intense. On Xarelto.     12.10.2024 initial visit Patient is a  lifelong NON-smoker with a past medical history of anticardiolipin syndrome with recurrent PE on Xarelto and previously on Plavix with a history of CAD status post stent referred to me by Madalyn Matias in consultation for  recurrent epistaxis. No other bleeding concerns.  No melena.  No nasal pain or nasal obstructive symptoms. Notes from hematology oncology nurse practitioner reflect concern for bleeding on Xarelto 15 mg QOD with recommendation to decreased to Xarelto 10 mg daily.    No head imaging.      No recent CBC.  Normal hemoglobin in .  No INR since       Labs:    Lab Results   Component Value Date    INR 1.2 2021    INR 1.6 2020    INR 1.1 2016     WBC   Date Value Ref Range Status   2024 5.3 3.8 - 10.8 Thousand/uL Final     Hemoglobin   Date Value Ref Range Status   2024 15.9 13.2 - 17.1 g/dL Final     Platelets   Date Value Ref Range Status   2024 172 140 - 400 Thousand/uL Final     Creatinine   Date Value Ref Range Status   2024 0.79 0.70 - 1.28 mg/dL Final       Past Medical History  He has a past medical history of Arthritis, Basal cell carcinoma, Blood clotting tendency, Cancer, Coronary artery disease, Gout, Heart attack, Hypertension, Joint pain, Keloid cicatrix, Kidney stone, Neoplastic syndrome,  Pulmonary embolism, Renal stone, Squamous cell carcinoma, and Wears glasses.    Family / Surgical / Social History  His family history includes Arthritis in his mother; Deep vein thrombosis in his brother; Heart attack in his brother and father; Nephrolithiasis in his father; Pulmonary embolism in his brother; Ulcers in his mother.    Past Surgical History:   Procedure Laterality Date    BACK SURGERY  9/2010    L4-5 fusion decompression    BONE RESECTION, HUMERUS  1969    ORIF L radial & ulnar    CYSTOSCOPY      JOINT REPLACEMENT  2009    Partial Rt shoulder    KIDNEY STONE SURGERY      KNEE ARTHROSCOPY      LEFT HEART CATHETERIZATION Left 2/20/2020    Procedure: CATHETERIZATION, HEART, LEFT (RIGHT RADIAL);  Surgeon: Cheryl Mondragon MD;  Location: [facility] CATH/EP LAB;  Service: Cardiology;  Laterality: Left;    LITHOTRIPSY      RADIOFREQUENCY ABLATION  2016    for chronic back pain    removal of colon polyp      Rt shoulder      Partial replacement 2008       Social History     Tobacco Use    Smoking status: Never    Smokeless tobacco: Never   Substance and Sexual Activity    Alcohol use: Yes     Alcohol/week: 1.0 standard drink of alcohol     Types: 1 Cans of beer per week     Comment: Socially    Drug use: No    Sexual activity: Yes     Partners: Female       Medications  He has a current medication list which includes the following prescription(s): allopurinol, b complex vitamins, captopril, cholecalciferol (vitamin d3), levothyroxine, magnesium oxide, methocarbamol, metoprolol succinate, multivitamin, prednisone, rivaroxaban, simvastatin, tadalafil, and tramadol.      Allergies  Review of patient's allergies indicates:   Allergen Reactions    Penicillins Hives and Other (See Comments)       All medications, allergies, and past history have been reviewed.    Objective:      Vitals:      7/17/2024    10:02 AM 12/10/2024     9:02 AM 1/2/2025    10:35 AM   Vitals - 1 value per visit   SYSTOLIC 134 144 159  "  DIASTOLIC 82 81 79   Pulse 58 58    Weight (lb) 271.8 274.03    Weight (kg) 123.288 124.3    Height 6' 1" (1.854 m) 6' 1" (1.854 m)    BMI (Calculated) 35.9 36.2    Pain Score Zero  Five       There is no height or weight on file to calculate BSA.    Physical Exam:    GENERAL  APPEARANCE -  alert, appears stated age, cooperative, and moderately obese  BARRIER(S) TO COMMUNICATION -  none VOICE - appropriate for age and gender    INTEGUMENTARY  no suspicious head and neck lesions    HEENT  HEAD: Normocephalic, without obvious abnormality, atraumatic  FACE: INSPECTION - Symmetric, no signs of trauma, no suspicious lesion(s)      STRENGTH - facial symmetry intact     PALPATION -  No masses     SALIVARY GLANDS - non-tender with no appreciable mass    NECK/THYROID: normal atraumatic, no neck masses, normal thyroid, no jvd    EYES  Normal occular alignment and mobility with no visible nystagmus at rest    EARS/NOSE/MOUTH/THROAT  EARS  PINNAE AND EXTERNAL EARS - no suspicious lesion OTOSCOPIC EXAM (surgical microscopy was not used for visualization/instrumentation): EAR EXAM - Normal ear canals, tympanic membranes and mobility, and middle ear spaces bilaterally.  HEARING - grossly intact to voice/finger rub    NOSE AND SINUSES  EXTERNAL NOSE - Grossly normal for age/sex  SEPTUM - slight end on vessel white bump caudal inferior Little's area on left only.  Much smaller but bleeds mildly w/manipulation.  Slight pale area on right with a slight vein but no bleeding.  No other abnormal findings TURBINATES - within normal limits MUCOSA - within normal limits     MOUTH AND THROAT   ORAL CAVITY, LIPS, TEETH, GUMS & TONGUE - moist, no suspicious lesions  OROPHARYNX /TONSILS/PHARYNGEAL WALLS/HYPOPHARYNX - no erythema or exudates  NASOPHARYNX - limited mirror exam - unable to visualize due to anatomy/gag  LARYNX -  - limited mirror exam - unable to visualize due to anatomy/gag      CHEST AND LUNG   INSPECTION & AUSCULTATION - " normal effort, no stridor    CARDIOVASCULAR  AUSCULTATION & PERIPHERAL VASCULAR - regular rate and rhythm.    NEUROLOGIC  MENTAL STATUS - alert, interactive CRANIAL NERVES - normal    LYMPHATIC  HEAD AND NECK - non-palpable; SUPRACLAVICULAR - deferred      Procedure(s):  Left focal septal nasal cautery.  See procedure note.          Assessment:      Problem List Items Addressed This Visit          Hematology    History of pulmonary embolus (PE) (Chronic)     Other Visit Diagnoses       Epistaxis    -  Primary    Anticoagulant long-term use                       Plan:      Focal cautery of end on vessel left lower caudal Little's area w/ minimal bleeding. Much smaller.  Hopefully this will be more definitive.  Aftercare instructions.  Xarelto dosing per Hematology and Cardiology.    Chin strap and maximum humidity on CPAP recommended.    Follow up as needed.          Voice recognition software was used in the creation of this note/communication and any sound-alike errors which may have occurred from its use should be taken in context when interpreting.  If such errors prevent a clear understanding of the note/communication, please contact the office for clarification.

## 2025-03-05 ENCOUNTER — OFFICE VISIT (OUTPATIENT)
Dept: OTOLARYNGOLOGY | Facility: CLINIC | Age: 75
End: 2025-03-05
Payer: MEDICARE

## 2025-03-05 VITALS
HEIGHT: 73 IN | DIASTOLIC BLOOD PRESSURE: 85 MMHG | SYSTOLIC BLOOD PRESSURE: 170 MMHG | HEART RATE: 60 BPM | WEIGHT: 274.94 LBS | BODY MASS INDEX: 36.44 KG/M2

## 2025-03-05 DIAGNOSIS — I10 HYPERTENSION, POOR CONTROL: ICD-10-CM

## 2025-03-05 DIAGNOSIS — Z79.82 ASPIRIN LONG-TERM USE: ICD-10-CM

## 2025-03-05 DIAGNOSIS — R04.0 EPISTAXIS: Primary | ICD-10-CM

## 2025-03-05 DIAGNOSIS — Z86.711 HISTORY OF PULMONARY EMBOLUS (PE): ICD-10-CM

## 2025-03-05 DIAGNOSIS — Z79.01 ANTICOAGULANT LONG-TERM USE: ICD-10-CM

## 2025-03-05 PROCEDURE — 99999 PR PBB SHADOW E&M-EST. PATIENT-LVL III: CPT | Mod: PBBFAC,,, | Performed by: OTOLARYNGOLOGY

## 2025-03-05 RX ORDER — ASPIRIN 81 MG/1
81 TABLET ORAL DAILY
COMMUNITY

## 2025-03-05 NOTE — PROGRESS NOTES
[facility]    Subjective:      Patient: Matt Rae Patient PCP: Umesh Cornejo MD         :  1950     Sex:  male      MRN:  [id number]          Date of Visit: 2025      Chief Complaint: Epistaxis (Nose started bleeding on the right side on Monday in the shower. It was a heavy nose bleed. Put pressure for about 15 mins, used afrin 2-3 times and pressure again. States that he coughed up a large blood clot from running down the back of throat. Took about 30 mins to stop. States that yesterday when brushed teeth, had blood in the spit. Used Afrin & pressure again. Went to Phys Therapy a had slight spotting. Was put back on Xarelto 15 mg a mo ago. Holding right now. Did take abx for dentist Monday.)      Patient ID: Matt Rae is a 74 y.o. male     2025 established patient visit Patient visit for epistaxis.  Left cautery repeated in January.  Put back on high-dose Xarelto (history of CAD status post stent, anticardiolipin syndrome, and history of pulmonary embolism) and noticed bleeding but this time on the contralateral right side.  Held by Cardiology due to the active bleeding two days ago.  Patient has a history of bleeding from the nose on Xarelto 20 in the past but not on 10 recently put back up to 15 by Cardiology with recurrence of bleeding.  Prior history of Coumadin with significant skin bleeding but no other bleeding in the rectum or otherwise on Xarelto like with Coumadin.  Considering Eliquis.  Last creatinine check just 2 weeks ago was normal.  Hemoglobin also normal at 14.8 on 2025 as well as normal platelet count of 167.  Patient does take low dose daily aspirin, but no other containing products.      No head or sinus imaging current or prior for review.    2025 established Patient visit for epistaxis.  Left cautery last month. Recurrence 24 again on left.  Much less intense. On Xarelto.     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 No other bleeding concerns.  No melena.  No nasal pain or nasal obstructive symptoms. Notes from hematology oncology nurse practitioner reflect concern for bleeding on Xarelto 15 mg QOD with recommendation to decreased to Xarelto 10 mg daily.    No head imaging.      No recent CBC.  Normal hemoglobin in June.  No INR since 2021      Labs:    Lab Results   Component Value Date    INR 1.2 05/20/2021    INR 1.6 02/17/2020    INR 1.1 03/09/2016     WBC   Date Value Ref Range Status   02/24/2025 4.96 3.90 - 12.70 K/uL Final     Hemoglobin   Date Value Ref Range Status   02/24/2025 14.8 14.0 - 18.0 g/dL Final     Platelets   Date Value Ref Range Status   02/24/2025 167 150 - 450 K/uL Final     Creatinine   Date Value Ref Range Status   02/24/2025 0.87 0.50 - 1.40 mg/dL Final       Past Medical History  He has a past medical history of Arthritis, Basal cell carcinoma, Blood clotting tendency, Cancer, Coronary artery disease, Gout, Heart attack, Hypertension, Joint pain, Keloid cicatrix, Kidney stone, Neoplastic syndrome, Pulmonary embolism, Renal stone, Squamous cell carcinoma, and Wears glasses.    Family / Surgical / Social History  His family history includes Arthritis in his mother; Deep vein thrombosis in his brother; Heart attack in his brother and father; Nephrolithiasis in his father; Pulmonary embolism in his brother; Ulcers in his mother.    Past Surgical History:   Procedure Laterality Date    BACK SURGERY  9/2010    L4-5 fusion decompression    BONE RESECTION, HUMERUS  1969    ORIF L radial & ulnar    CYSTOSCOPY      JOINT REPLACEMENT  2009    Partial Rt shoulder    KIDNEY STONE SURGERY      KNEE ARTHROSCOPY      LEFT HEART CATHETERIZATION Left 2/20/2020    Procedure: CATHETERIZATION,  "HEART, LEFT (RIGHT RADIAL);  Surgeon: Cheryl Mondragon MD;  Location: [facility] CATH/EP LAB;  Service: Cardiology;  Laterality: Left;    LITHOTRIPSY      RADIOFREQUENCY ABLATION  2016    for chronic back pain    removal of colon polyp      Rt shoulder      Partial replacement 2008       Social History     Tobacco Use    Smoking status: Never    Smokeless tobacco: Never   Substance and Sexual Activity    Alcohol use: Yes     Alcohol/week: 1.0 standard drink of alcohol     Types: 1 Cans of beer per week     Comment: Socially    Drug use: No    Sexual activity: Yes     Partners: Female       Medications  He has a current medication list which includes the following prescription(s): allopurinol, b complex vitamins, captopril, carvedilol, cholecalciferol (vitamin d3), levothyroxine, magnesium oxide, methocarbamol, multivitamin, prednisone, simvastatin, tadalafil, tramadol, and xarelto.      Allergies  Review of patient's allergies indicates:   Allergen Reactions    Penicillins Hives and Other (See Comments)       All medications, allergies, and past history have been reviewed.    Objective:      Vitals:      2/5/2025     9:52 AM 2/21/2025    10:59 AM 3/5/2025    10:37 AM   Vitals - 1 value per visit   SYSTOLIC 154 140 170   DIASTOLIC 88 80 85   Pulse 67 85 60   Resp  15    Weight (lb) 274 276 274.91   Weight (kg) 124.286 125.193 124.7   Height 6' 1" (1.854 m) 6' 1" (1.854 m) 6' 1" (1.854 m)   BMI (Calculated) 36.2 36.4 36.3   Pain Score  Zero Zero       Body surface area is 2.53 meters squared.    Physical Exam:    GENERAL  APPEARANCE -  alert, appears stated age, cooperative, and moderately obese  BARRIER(S) TO COMMUNICATION -  none VOICE - appropriate for age and gender    INTEGUMENTARY  no suspicious head and neck lesions    HEENT  HEAD: Normocephalic, without obvious abnormality, atraumatic  FACE: INSPECTION - Symmetric, no signs of trauma, no suspicious lesion(s)      STRENGTH - facial symmetry intact     PALPATION -  " No masses     SALIVARY GLANDS - non-tender with no appreciable mass    NECK/THYROID: normal atraumatic, no neck masses, normal thyroid, no jvd    EYES  Normal occular alignment and mobility with no visible nystagmus at rest    EARS/NOSE/MOUTH/THROAT  EARS  PINNAE AND EXTERNAL EARS - no suspicious lesion OTOSCOPIC EXAM (surgical microscopy was not used for visualization/instrumentation): EAR EXAM - Normal ear canals, tympanic membranes and mobility, and middle ear spaces bilaterally.  HEARING - grossly intact to voice/finger rub    NOSE AND SINUSES  EXTERNAL NOSE - Grossly normal for age/sex  SEPTUM - slight end on vessel white bump caudal inferior Little's area on left only.  Much smaller but bleeds mildly w/manipulation.  Slight pale area on right with a slight vein but no bleeding.  No other abnormal findings TURBINATES - within normal limits MUCOSA - within normal limits     MOUTH AND THROAT   ORAL CAVITY, LIPS, TEETH, GUMS & TONGUE - moist, no suspicious lesions  OROPHARYNX /TONSILS/PHARYNGEAL WALLS/HYPOPHARYNX - no erythema or exudates  NASOPHARYNX - limited mirror exam - unable to visualize due to anatomy/gag  LARYNX -  - limited mirror exam - unable to visualize due to anatomy/gag      CHEST AND LUNG   INSPECTION & AUSCULTATION - normal effort, no stridor    CARDIOVASCULAR  AUSCULTATION & PERIPHERAL VASCULAR - regular rate and rhythm.    NEUROLOGIC  MENTAL STATUS - alert, interactive CRANIAL NERVES - normal    LYMPHATIC  HEAD AND NECK - non-palpable; SUPRACLAVICULAR - deferred      Procedure(s):  RIGHT focal base of septum nasal cautery.  See procedure note.          Assessment:      Problem List Items Addressed This Visit          Hematology    History of pulmonary embolus (PE) (Chronic)     Other Visit Diagnoses         Epistaxis    -  Primary      Anticoagulant long-term use          Aspirin long-term use          Hypertension, poor control                         Plan:      Work with cardiology for change  from Xarelto 15 mg to Eliquis.  Avoid any over-the-counter medications which may contain aspirin such as Excedrin or good East powders.      Consider using Aquaphor beyond the healing phase of the nasal cautery.  Using this at night may afford some better protection then the saline gel which may be of better use several times throughout the day rather than just twice daily.    There does not appear to be an indication for contrast-enhanced imaging or consideration of endovascular or sinonasal surgery to reduce blood flow to the nose to prevent bleeding at this time as discussed.      Return with any worsening of symptoms, failure to improve, or any other concerns for further evaluation and treatment.      Voice recognition software was used in the creation of this note/communication and any sound-alike errors which may have occurred from its use should be taken in context when interpreting.  If such errors prevent a clear understanding of the note/communication, please contact the office for clarification.          Voice recognition software was used in the creation of this note/communication and any sound-alike errors which may have occurred from its use should be taken in context when interpreting.  If such errors prevent a clear understanding of the note/communication, please contact the office for clarification.

## 2025-03-05 NOTE — PATIENT INSTRUCTIONS
Work with cardiology for change from Xarelto 15 mg to Eliquis.  Avoid any over-the-counter medications which may contain aspirin such as Excedrin or good [place] powders.      Consider using Aquaphor beyond the healing phase of the nasal cautery.  Using this at night may afford some better protection then the saline gel which may be of better use several times throughout the day rather than just twice daily.    There does not appear to be an indication for contrast-enhanced imaging or consideration of endovascular or sinonasal surgery to reduce blood flow to the nose to prevent bleeding at this time as discussed.      Return with any worsening of symptoms, failure to improve, or any other concerns for further evaluation and treatment.      Voice recognition software was used in the creation of this note/communication and any sound-alike errors which may have occurred from its use should be taken in context when interpreting.  If such errors prevent a clear understanding of the note/communication, please contact the office for clarification.      NASAL CAUTERY AFTERCARE    Use lots of saline throughout the day to keep the nose moist.  Consider using saline gel for more long-term moisture  Aquaphor or Vaseline should be applied to the nostrils there were cauterized if not on both sides at least at bedtime sometimes several times daily if instructed to facilitate healing.  Minimal bleeding after cauterization is not atypical but this should continue to improve and resolve.  Follow-up for repeat cauterization or further evaluation treatment if symptoms of bleeding are not resolved.      HOW TO STOP NOSE BLEEDING    If bleeding does recur pinched the nose fully shut the entire soft portion of the nose.  Sit upright with the chin down and spit out any blood.  Afrin decongestant nasal spray (oxymetazoline) can be used to further decongest the nose and stop bleeding if direct pressure as above is not sufficient. OTC topicals  "like "Nasal Cease", a product made from algae can help with clotting as well.  Try NOT to stuff tissue or cotton in the nose as it may cause additional trauma and displace a clot on removal restarting bleeding. Go straight to the ER if bleeding is uncontrollable with those measures outlined.    DRY NOSE CARE    Use lots of saline throughout the day to keep the nose moist.  Consider using saline gel for longer-term moisturizing.  Aquaphor or Vaseline should be applied to the nostrils at night when needed for crusting/more severe dryness. Antibiotic ointment can be used up to a week for tender dryness/crusting.  Follow-up for further evaluation treatment if symptoms of are not resolved.    "

## 2025-03-08 ENCOUNTER — PATIENT MESSAGE (OUTPATIENT)
Dept: OTOLARYNGOLOGY | Facility: CLINIC | Age: 75
End: 2025-03-08
Payer: MEDICARE

## 2025-03-11 NOTE — TELEPHONE ENCOUNTER
Sin Saul MD to Me (Selected Message)        3/11/25  2:05 PM  Yes to Aquaphor and continue conservative care. Unfortunately I need to see him in the office.

## 2025-03-17 ENCOUNTER — OFFICE VISIT (OUTPATIENT)
Dept: OTOLARYNGOLOGY | Facility: CLINIC | Age: 75
End: 2025-03-17
Payer: MEDICARE

## 2025-03-17 VITALS
BODY MASS INDEX: 36.35 KG/M2 | SYSTOLIC BLOOD PRESSURE: 153 MMHG | DIASTOLIC BLOOD PRESSURE: 82 MMHG | WEIGHT: 274.25 LBS | HEART RATE: 56 BPM | HEIGHT: 73 IN

## 2025-03-17 DIAGNOSIS — Z79.82 ASPIRIN LONG-TERM USE: ICD-10-CM

## 2025-03-17 DIAGNOSIS — Z79.01 ANTICOAGULANT LONG-TERM USE: ICD-10-CM

## 2025-03-17 DIAGNOSIS — R04.0 EPISTAXIS: Primary | ICD-10-CM

## 2025-03-17 DIAGNOSIS — Z86.711 HISTORY OF PULMONARY EMBOLUS (PE): ICD-10-CM

## 2025-03-17 PROCEDURE — 3077F SYST BP >= 140 MM HG: CPT | Mod: CPTII,S$GLB,, | Performed by: OTOLARYNGOLOGY

## 2025-03-17 PROCEDURE — 1101F PT FALLS ASSESS-DOCD LE1/YR: CPT | Mod: CPTII,S$GLB,, | Performed by: OTOLARYNGOLOGY

## 2025-03-17 PROCEDURE — 1126F AMNT PAIN NOTED NONE PRSNT: CPT | Mod: CPTII,S$GLB,, | Performed by: OTOLARYNGOLOGY

## 2025-03-17 PROCEDURE — 1160F RVW MEDS BY RX/DR IN RCRD: CPT | Mod: CPTII,S$GLB,, | Performed by: OTOLARYNGOLOGY

## 2025-03-17 PROCEDURE — 4010F ACE/ARB THERAPY RXD/TAKEN: CPT | Mod: CPTII,S$GLB,, | Performed by: OTOLARYNGOLOGY

## 2025-03-17 PROCEDURE — 3079F DIAST BP 80-89 MM HG: CPT | Mod: CPTII,S$GLB,, | Performed by: OTOLARYNGOLOGY

## 2025-03-17 PROCEDURE — 3288F FALL RISK ASSESSMENT DOCD: CPT | Mod: CPTII,S$GLB,, | Performed by: OTOLARYNGOLOGY

## 2025-03-17 PROCEDURE — 99999 PR PBB SHADOW E&M-EST. PATIENT-LVL III: CPT | Mod: PBBFAC,,, | Performed by: OTOLARYNGOLOGY

## 2025-03-17 PROCEDURE — 3008F BODY MASS INDEX DOCD: CPT | Mod: CPTII,S$GLB,, | Performed by: OTOLARYNGOLOGY

## 2025-03-17 PROCEDURE — 1159F MED LIST DOCD IN RCRD: CPT | Mod: CPTII,S$GLB,, | Performed by: OTOLARYNGOLOGY

## 2025-03-17 PROCEDURE — 99213 OFFICE O/P EST LOW 20 MIN: CPT | Mod: S$GLB,,, | Performed by: OTOLARYNGOLOGY

## 2025-03-17 NOTE — PROGRESS NOTES
[facility]    Subjective:      Patient: Matt Rae Patient PCP: Umesh Cornejo MD         :  1950     Sex:  male      MRN:  [id number]          Date of Visit: 2025      Chief Complaint: Follow-up (Recheck Nose, Patient states he has been using the Aquaphor am and pm as suggested, but he gets a burning sensation in his right nostril when using the Aquaphor. He states his pain level today is less than 1 right now.  )      Patient ID: Matt Rae is a 74 y.o. male     2025 established patient 2 week follow up visit for epistaxis affecting the left side requiring repeated cauterization whenever on Xarelto.  At our last visit just 2 weeks ago with easy bleeding of a previously cauterized end on vessel of the left Little's area.  Since our last visit he has continued use Aquaphor which he feels at times burns the nose.  No active bleeding but he has seen a little bit of blood on that affected right side.  He is on Eliquis as of yesterday.      2025 established patient visit Patient visit for epistaxis.  Left cautery repeated in January.  Put back on high-dose Xarelto (history of CAD status post stent, anticardiolipin syndrome, and history of pulmonary embolism) and noticed bleeding but this time on the contralateral right side.  Held by Cardiology due to the active bleeding two days ago.  Patient has a history of bleeding from the nose on Xarelto 20 in the past but not on 10 recently put back up to 15 by Cardiology with recurrence of bleeding.  Prior history of Coumadin with significant skin bleeding but no other bleeding in the rectum or otherwise on Xarelto like with Coumadin.  Considering Eliquis.  Last creatinine check just 2 weeks ago was normal.  Hemoglobin also normal at 14.8 on 2025 as well as normal platelet count of 167.  Patient does take low dose daily aspirin, but no other containing products.      No head or sinus imaging current or prior for  review.    01.02.2025 established Patient visit for epistaxis.  Left cautery last month. Recurrence 12/31/24 again on left.  Much less intense. On Xarelto.     12.10.2024 initial visit Patient is a  lifelong NON-smoker with a past medical history of anticardiolipin syndrome with recurrent PE on Xarelto and previously on Plavix with a history of CAD status post stent referred to me by Madalyn Matias in consultation for  recurrent epistaxis. No other bleeding concerns.  No melena.  No nasal pain or nasal obstructive symptoms. Notes from hematology oncology nurse practitioner reflect concern for bleeding on Xarelto 15 mg QOD with recommendation to decreased to Xarelto 10 mg daily.    No head imaging.      No recent CBC.  Normal hemoglobin in June.  No INR since 2021      Labs:    Lab Results   Component Value Date    INR 1.2 05/20/2021    INR 1.6 02/17/2020    INR 1.1 03/09/2016     WBC   Date Value Ref Range Status   02/24/2025 4.96 3.90 - 12.70 K/uL Final     Hemoglobin   Date Value Ref Range Status   02/24/2025 14.8 14.0 - 18.0 g/dL Final     Platelets   Date Value Ref Range Status   02/24/2025 167 150 - 450 K/uL Final     Creatinine   Date Value Ref Range Status   02/24/2025 0.87 0.50 - 1.40 mg/dL Final       Past Medical History  He has a past medical history of Arthritis, Basal cell carcinoma, Blood clotting tendency, Cancer, Coronary artery disease, Gout, Heart attack, Hypertension, Joint pain, Keloid cicatrix, Kidney stone, Neoplastic syndrome, Pulmonary embolism, Renal stone, Squamous cell carcinoma, and Wears glasses.    Family / Surgical / Social History  His family history includes Arthritis in his mother; Deep vein thrombosis in his brother; Heart attack in his brother and father; Nephrolithiasis in his father; Pulmonary embolism in his brother; Ulcers in his mother.    Past Surgical History:   Procedure Laterality Date    BACK SURGERY  9/2010    L4-5 fusion decompression    BONE RESECTION, HUMERUS  1969  "   ORIF L radial & ulnar    CYSTOSCOPY      JOINT REPLACEMENT  2009    Partial Rt shoulder    KIDNEY STONE SURGERY      KNEE ARTHROSCOPY      LEFT HEART CATHETERIZATION Left 2/20/2020    Procedure: CATHETERIZATION, HEART, LEFT (RIGHT RADIAL);  Surgeon: Cheryl Mondragon MD;  Location: [facility] CATH/EP LAB;  Service: Cardiology;  Laterality: Left;    LITHOTRIPSY      RADIOFREQUENCY ABLATION  2016    for chronic back pain    removal of colon polyp      Rt shoulder      Partial replacement 2008       Social History     Tobacco Use    Smoking status: Never    Smokeless tobacco: Never   Substance and Sexual Activity    Alcohol use: Yes     Alcohol/week: 1.0 standard drink of alcohol     Types: 1 Cans of beer per week     Comment: Socially    Drug use: No    Sexual activity: Yes     Partners: Female       Medications  He has a current medication list which includes the following prescription(s): allopurinol, apixaban, aspirin, b complex vitamins, captopril, carvedilol, cholecalciferol (vitamin d3), levothyroxine, magnesium oxide, methocarbamol, multivitamin, prednisone, simvastatin, tadalafil, and tramadol.      Allergies  Review of patient's allergies indicates:   Allergen Reactions    Penicillins Hives and Other (See Comments)       All medications, allergies, and past history have been reviewed.    Objective:      Vitals:      3/5/2025    10:37 AM 3/12/2025    11:07 AM 3/17/2025     9:47 AM   Vitals - 1 value per visit   SYSTOLIC 170 140 153   DIASTOLIC 85 78 82   Pulse 60 58 56   Weight (lb) 274.91 274.2 274.25   Weight (kg) 124.7 124.376 124.4   Height 6' 1" (1.854 m) 6' 1" (1.854 m) 6' 1" (1.854 m)   BMI (Calculated) 36.3 36.2 36.2   Pain Score Zero  Zero       Body surface area is 2.53 meters squared.    Physical Exam:    GENERAL  APPEARANCE -  alert, appears stated age, cooperative, and moderately obese  BARRIER(S) TO COMMUNICATION -  none VOICE - appropriate for age and gender    INTEGUMENTARY  no suspicious head " and neck lesions    HEENT  HEAD: Normocephalic, without obvious abnormality, atraumatic  FACE: INSPECTION - Symmetric, no signs of trauma, no suspicious lesion(s)      STRENGTH - facial symmetry intact     PALPATION -  No masses     SALIVARY GLANDS - non-tender with no appreciable mass    NECK/THYROID: normal atraumatic, no neck masses, normal thyroid, no jvd    EYES  Normal occular alignment and mobility with no visible nystagmus at rest    EARS/NOSE/MOUTH/THROAT  EARS  PINNAE AND EXTERNAL EARS - no suspicious lesion OTOSCOPIC EXAM (surgical microscopy was not used for visualization/instrumentation): EAR EXAM - Normal ear canals, tympanic membranes and mobility, and middle ear spaces bilaterally.  HEARING - grossly intact to voice/finger rub    NOSE AND SINUSES  EXTERNAL NOSE - Grossly normal for age/sex  SEPTUM - some focus of exposed cartilage perichondrium of the base of the septum on the right side for proximally 4-5 mm.  Some rolled TURBINATES - within normal limits MUCOSA - within normal limits     MOUTH AND THROAT   ORAL CAVITY, LIPS, TEETH, GUMS & TONGUE - moist, no suspicious lesions  OROPHARYNX /TONSILS/PHARYNGEAL WALLS/HYPOPHARYNX - no erythema or exudates  NASOPHARYNX - limited mirror exam - unable to visualize due to anatomy/gag  LARYNX -  - limited mirror exam - unable to visualize due to anatomy/gag      CHEST AND LUNG   INSPECTION & AUSCULTATION - normal effort, no stridor    CARDIOVASCULAR  AUSCULTATION & PERIPHERAL VASCULAR - regular rate and rhythm.    NEUROLOGIC  MENTAL STATUS - alert, interactive CRANIAL NERVES - normal    LYMPHATIC  HEAD AND NECK - non-palpable; SUPRACLAVICULAR - deferred      Procedure(s):  RIGHT focal base of septum nasal cautery.  See procedure note.          Assessment:      Problem List Items Addressed This Visit          Hematology    History of pulmonary embolus (PE) (Chronic)     Other Visit Diagnoses         Epistaxis    -  Primary      Anticoagulant long-term use           Aspirin long-term use                           Plan:      Not cauterized to facilitate healing fingers crosses seems to be doing okay on Eliquis.  Too early to really say.  Aggressive moisturization of the nose with frequent Aquaphor application discussed at length.  Especially with upcoming trip to [place] with higher altitude and drier air.    Recheck in 4 weeks to assure that there is ongoing healing and not additional need for intervention.  No evidence of posterior bleeding needing imaging or endovascular intervention or sphenopalatine artery ligation    Return with any worsening of symptoms, failure to improve, or any other concerns for further evaluation and treatment.      Voice recognition software was used in the creation of this note/communication and any sound-alike errors which may have occurred from its use should be taken in context when interpreting.  If such errors prevent a clear understanding of the note/communication, please contact the office for clarification.

## 2025-03-21 ENCOUNTER — PATIENT MESSAGE (OUTPATIENT)
Dept: ORTHOPEDICS | Facility: CLINIC | Age: 75
End: 2025-03-21
Payer: MEDICARE

## 2025-03-25 DIAGNOSIS — M25.561 RIGHT KNEE PAIN, UNSPECIFIED CHRONICITY: Primary | ICD-10-CM

## 2025-03-26 ENCOUNTER — OFFICE VISIT (OUTPATIENT)
Dept: ORTHOPEDICS | Facility: CLINIC | Age: 75
End: 2025-03-26
Payer: MEDICARE

## 2025-03-26 ENCOUNTER — HOSPITAL ENCOUNTER (OUTPATIENT)
Dept: RADIOLOGY | Facility: HOSPITAL | Age: 75
Discharge: HOME | End: 2025-03-26
Attending: ORTHOPAEDIC SURGERY
Payer: MEDICARE

## 2025-03-26 DIAGNOSIS — M17.10 ARTHRITIS OF KNEE: ICD-10-CM

## 2025-03-26 DIAGNOSIS — M25.561 RIGHT KNEE PAIN, UNSPECIFIED CHRONICITY: ICD-10-CM

## 2025-03-26 DIAGNOSIS — M25.561 RIGHT KNEE PAIN, UNSPECIFIED CHRONICITY: Primary | ICD-10-CM

## 2025-03-26 PROCEDURE — 20610 DRAIN/INJ JOINT/BURSA W/O US: CPT | Mod: RT,S$GLB,, | Performed by: ORTHOPAEDIC SURGERY

## 2025-03-26 PROCEDURE — 73564 X-RAY EXAM KNEE 4 OR MORE: CPT | Mod: 26,RT,, | Performed by: RADIOLOGY

## 2025-03-26 PROCEDURE — 73562 X-RAY EXAM OF KNEE 3: CPT | Mod: TC,PO,LT

## 2025-03-26 PROCEDURE — 99214 OFFICE O/P EST MOD 30 MIN: CPT | Mod: 25,S$GLB,, | Performed by: ORTHOPAEDIC SURGERY

## 2025-03-26 PROCEDURE — 4010F ACE/ARB THERAPY RXD/TAKEN: CPT | Mod: CPTII,S$GLB,, | Performed by: ORTHOPAEDIC SURGERY

## 2025-03-26 PROCEDURE — 99999 PR PBB SHADOW E&M-EST. PATIENT-LVL I: CPT | Mod: PBBFAC,,, | Performed by: ORTHOPAEDIC SURGERY

## 2025-03-26 PROCEDURE — 73562 X-RAY EXAM OF KNEE 3: CPT | Mod: 26,59,LT, | Performed by: RADIOLOGY

## 2025-03-26 PROCEDURE — 1160F RVW MEDS BY RX/DR IN RCRD: CPT | Mod: CPTII,S$GLB,, | Performed by: ORTHOPAEDIC SURGERY

## 2025-03-26 PROCEDURE — 1159F MED LIST DOCD IN RCRD: CPT | Mod: CPTII,S$GLB,, | Performed by: ORTHOPAEDIC SURGERY

## 2025-03-26 RX ORDER — TRIAMCINOLONE ACETONIDE 40 MG/ML
40 INJECTION, SUSPENSION INTRA-ARTICULAR; INTRAMUSCULAR
Status: DISCONTINUED | OUTPATIENT
Start: 2025-03-26 | End: 2025-03-26 | Stop reason: HOSPADM

## 2025-03-26 RX ADMIN — TRIAMCINOLONE ACETONIDE 40 MG: 40 INJECTION, SUSPENSION INTRA-ARTICULAR; INTRAMUSCULAR at 02:03

## 2025-03-26 NOTE — PROGRESS NOTES
Past Medical History:   Diagnosis Date    Arthritis     Basal cell carcinoma     Right dorsal hand    Blood clotting tendency     Cancer     skin cancer    Coronary artery disease     MI  Stent placement 3/2005    Gout     on 300 allopurinol    Heart attack     Hypertension     Joint pain     Keloid cicatrix     Kidney stone     Neoplastic syndrome     Pulmonary embolism     x 2: 2005, 2015    Renal stone     Squamous cell carcinoma 02/22/2016    Right cheek    Wears glasses        Past Surgical History:   Procedure Laterality Date    BACK SURGERY  9/2010    L4-5 fusion decompression    BONE RESECTION, HUMERUS  1969    ORIF L radial & ulnar    CYSTOSCOPY      JOINT REPLACEMENT  2009    Partial Rt shoulder    KIDNEY STONE SURGERY      KNEE ARTHROSCOPY      LEFT HEART CATHETERIZATION Left 2/20/2020    Procedure: CATHETERIZATION, HEART, LEFT (RIGHT RADIAL);  Surgeon: Cheryl Mondragon MD;  Location: [facility] CATH/EP LAB;  Service: Cardiology;  Laterality: Left;    LITHOTRIPSY      RADIOFREQUENCY ABLATION  2016    for chronic back pain    removal of colon polyp      Rt shoulder      Partial replacement 2008       Current Outpatient Medications   Medication Sig    allopurinoL (ZYLOPRIM) 300 MG tablet     apixaban (ELIQUIS) 5 mg Tab Take 1 tablet (5 mg total) by mouth 2 (two) times daily.    aspirin (ECOTRIN) 81 MG EC tablet Take 81 mg by mouth once daily.    b complex vitamins tablet Take 1 tablet by mouth once daily.    captopriL (CAPOTEN) 50 MG tablet Take 1 tablet (50 mg total) by mouth 2 (two) times daily.    carvediloL (COREG) 12.5 MG tablet Take 1 tablet (12.5 mg total) by mouth 2 (two) times daily with meals.    cholecalciferol, vitamin D3, 125 mcg (5,000 unit) Tab 5,000 Units once daily.     levothyroxine (SYNTHROID) 75 MCG tablet Take 75 mcg by mouth every morning.    magnesium oxide (MAG-OX) 400 mg (241.3 mg magnesium) tablet Take 400 mg by mouth once daily.     methocarbamoL (ROBAXIN) 500 MG Tab Take 500 mg by  mouth 3 (three) times daily as needed.    multivitamin capsule Take 1 capsule by mouth once daily.    predniSONE (DELTASONE) 20 MG tablet Take 20 mg by mouth daily as needed (Gout).    simvastatin (ZOCOR) 10 MG tablet TAKE 1 TABLET EVERY EVENING    tadalafil (CIALIS) 20 MG Tab Take 20 mg by mouth daily as needed.     tramadol (ULTRAM) 50 mg tablet Take 50 mg by mouth every 8 (eight) hours as needed.      No current facility-administered medications for this visit.       Review of patient's allergies indicates:   Allergen Reactions    Penicillins Hives and Other (See Comments)       Family History   Problem Relation Name Age of Onset    Nephrolithiasis Father      Heart attack Father      Arthritis Mother      Ulcers Mother      Deep vein thrombosis Brother      Pulmonary embolism Brother      Heart attack Brother      Melanoma Neg Hx      Psoriasis Neg Hx      Lupus Neg Hx      Eczema Neg Hx      Acne Neg Hx         Social History     Socioeconomic History    Marital status:    Occupational History    Occupation: Fire Protection   Tobacco Use    Smoking status: Never    Smokeless tobacco: Never   Substance and Sexual Activity    Alcohol use: Yes     Alcohol/week: 1.0 standard drink of alcohol     Types: 1 Cans of beer per week     Comment: Socially    Drug use: No    Sexual activity: Yes     Partners: Female     Social Drivers of Health     Financial Resource Strain: Low Risk  (2/20/2025)    Overall Financial Resource Strain (CARDIA)     Difficulty of Paying Living Expenses: Not hard at all   Food Insecurity: No Food Insecurity (2/20/2025)    Hunger Vital Sign     Worried About Running Out of Food in the Last Year: Never true     Ran Out of Food in the Last Year: Never true   Transportation Needs: No Transportation Needs (2/20/2025)    PRAPARE - Transportation     Lack of Transportation (Medical): No     Lack of Transportation (Non-Medical): No   Physical Activity: Sufficiently Active (2/20/2025)    Exercise  Vital Sign     Days of Exercise per Week: 4 days     Minutes of Exercise per Session: 60 min   Stress: No Stress Concern Present (2/20/2025)    [employer] - Occupational Stress Questionnaire     Feeling of Stress : Only a little   Housing Stability: Low Risk  (2/20/2025)    Housing Stability Vital Sign     Unable to Pay for Housing in the Last Year: No     Number of Times Moved in the Last Year: 0     Homeless in the Last Year: No       Chief Complaint: No chief complaint on file.      History of present illness:  74-year-old male seen for right knee pain and swelling.  I saw the patient is a couple years ago for right knee effusion and pain.  I aspirated and injected his knee and it lasted up until about last week when he tripped off a curb and landed on his right knee.  Has a lot of swelling and pain again.  Pain is a 4/10.      Review of Systems:    Constitution: Negative for chills, fever, and sweats.  Negative for unexplained weight loss.    HENT:  Negative for headaches and blurry vision.    Cardiovascular:Negative for chest pain or irregular heart beat. Negative for hypertension.    Respiratory:  Negative for cough and shortness of breath.    Gastrointestinal: Negative for abdominal pain, heartburn, melena, nausea, and vomitting.    Genitourinary:  Negative bladder incontinence and dysuria.    Musculoskeletal:  See HPI    Neurological: Negative for numbness.    Psychiatric/Behavioral: Negative for depression.  The patient is not nervous/anxious.      Endocrine: Negative for polyuria    Hematologic/Lymphatic: Negative for bleeding problem.  Does not bruise/bleed easily.    Skin: Negative for poor would healing and rash      Physical Examination:    Vital Signs:  There were no vitals filed for this visit.    There is no height or weight on file to calculate BMI.    This a well-developed, well nourished patient in no acute distress.  They are alert and oriented and cooperative to  examination.  Pt. walks without an antalgic gait.      Examination of the right knee shows abrasions over the anterolateral knee.. There are no masses ecchymosis And small effusion. Patient has full range of motion from 0-130°. Patient is nontender to palpation over lateral joint line and moderately tender to palpation over the medial joint line. Patient has a - Lachman exam, - anterior drawer exam, and - posterior drawer exam. - Apley exam. Knee is stable to varus and valgus stress. 5 out of 5 motor strength. Palpable distal pulses. Intact light touch sensation. Negative Patellofemoral crepitus      X-rays:  X-rays of the right knee are ordered and reviewed which show some mild medial joint space narrowing of the right knee and more moderate to severe medial narrowing of the left knee.  Small right joint effusion     Assessment::  Right knee arthritis with effusion    Plan:  I reviewed the finding with him today.  Recommended aspiration and injection of his right knee.  Talked about possibly getting an MRI if the knee continues to bother him.  I gave him a knee conditioning guide we also discussed his exercise routine.  Follow up as needed.    All previous pertinent notes including ER visits, physical therapy visits, other orthopedic visits as well as other care for the same musculoskeletal problem were reviewed.  All pertinent lab values and previous imaging was reviewed pertinent to the current visit.    This note was created using [employer] voice recognition software that occasionally misinterpreted phrases or words.    Consult note is delivered via Epic messaging service.

## 2025-03-26 NOTE — PROCEDURES
Large Joint Aspiration/Injection: R knee    Date/Time: 3/26/2025 2:30 PM    Performed by: Yohan Wells MD  Authorized by: Yohan Wells MD    Consent Done?:  Yes (Verbal)  Indications:  Pain  Site marked: the procedure site was marked    Timeout: prior to procedure the correct patient, procedure, and site was verified    Local anesthetic: Ropivicaine.  Anesthetic total (ml):  3      Details:  Needle Size:  18 G  Approach:  Medial  Location:  Knee  Site:  R knee  Medications:  40 mg triamcinolone acetonide 40 mg/mL  Aspirate amount (mL):  50  Aspirate:  Serous and yellow  Patient tolerance:  Patient tolerated the procedure well with no immediate complications

## 2025-05-12 ENCOUNTER — PATIENT MESSAGE (OUTPATIENT)
Facility: CLINIC | Age: 75
End: 2025-05-12
Payer: MEDICARE

## 2025-06-20 ENCOUNTER — LAB VISIT (OUTPATIENT)
Dept: LAB | Facility: HOSPITAL | Age: 75
End: 2025-06-20
Attending: INTERNAL MEDICINE
Payer: MEDICARE

## 2025-06-20 DIAGNOSIS — E83.42 HYPOMAGNESEMIA: ICD-10-CM

## 2025-06-20 DIAGNOSIS — N18.30 CHRONIC KIDNEY DISEASE, STAGE III (MODERATE): Primary | ICD-10-CM

## 2025-06-20 DIAGNOSIS — Z86.711 HISTORY OF PULMONARY EMBOLUS (PE): ICD-10-CM

## 2025-06-20 DIAGNOSIS — M10.9 GOUT, UNSPECIFIED CAUSE, UNSPECIFIED CHRONICITY, UNSPECIFIED SITE: ICD-10-CM

## 2025-06-20 LAB
ABSOLUTE EOSINOPHIL (SMH): 0.16 K/UL
ABSOLUTE MONOCYTE (SMH): 0.53 K/UL (ref 0.3–1)
ABSOLUTE NEUTROPHIL COUNT (SMH): 4.2 K/UL (ref 1.8–7.7)
ALBUMIN SERPL-MCNC: 3.6 G/DL (ref 3.5–5.2)
ALP SERPL-CCNC: 67 UNIT/L (ref 55–135)
ALT SERPL-CCNC: 12 UNIT/L (ref 10–44)
ANION GAP (SMH): 7 MMOL/L (ref 8–16)
AST SERPL-CCNC: 15 UNIT/L (ref 10–40)
BASOPHILS # BLD AUTO: 0.04 K/UL
BASOPHILS NFR BLD AUTO: 0.6 %
BILIRUB SERPL-MCNC: 0.5 MG/DL (ref 0.1–1)
BUN SERPL-MCNC: 17 MG/DL (ref 8–23)
CALCIUM SERPL-MCNC: 9.2 MG/DL (ref 8.7–10.5)
CHLORIDE SERPL-SCNC: 105 MMOL/L (ref 95–110)
CO2 SERPL-SCNC: 26 MMOL/L (ref 23–29)
CREAT SERPL-MCNC: 0.7 MG/DL (ref 0.5–1.4)
CREAT UR-MCNC: 85.3 MG/DL (ref 23–375)
ERYTHROCYTE [DISTWIDTH] IN BLOOD BY AUTOMATED COUNT: 13.2 % (ref 11.5–14.5)
GFR SERPLBLD CREATININE-BSD FMLA CKD-EPI: >60 ML/MIN/1.73/M2
GLUCOSE SERPL-MCNC: 100 MG/DL (ref 70–110)
HCT VFR BLD AUTO: 41.5 % (ref 40–54)
HGB BLD-MCNC: 13.7 GM/DL (ref 14–18)
IMM GRANULOCYTES # BLD AUTO: 0.03 K/UL (ref 0–0.04)
IMM GRANULOCYTES NFR BLD AUTO: 0.5 % (ref 0–0.5)
LYMPHOCYTES # BLD AUTO: 1.44 K/UL (ref 1–4.8)
MAGNESIUM SERPL-MCNC: 1.9 MG/DL (ref 1.6–2.6)
MCH RBC QN AUTO: 34.2 PG (ref 27–31)
MCHC RBC AUTO-ENTMCNC: 33 G/DL (ref 32–36)
MCV RBC AUTO: 104 FL (ref 82–98)
MICROSCOPIC COMMENT: NORMAL
NUCLEATED RBC (/100WBC) (SMH): 0 /100 WBC
PHOSPHATE SERPL-MCNC: 3.1 MG/DL (ref 2.7–4.5)
PLATELET # BLD AUTO: 225 K/UL (ref 150–450)
PMV BLD AUTO: 11.1 FL (ref 9.2–12.9)
POTASSIUM SERPL-SCNC: 4.4 MMOL/L (ref 3.5–5.1)
PROT SERPL-MCNC: 6.9 GM/DL (ref 6–8.4)
PROT UR-MCNC: 384 MG/DL
PROT/CREAT UR: 4.5 MG/G{CREAT}
RBC # BLD AUTO: 4.01 M/UL (ref 4.6–6.2)
RBC #/AREA URNS AUTO: 1 /HPF
RELATIVE EOSINOPHIL (SMH): 2.5 % (ref 0–8)
RELATIVE LYMPHOCYTE (SMH): 22.5 % (ref 18–48)
RELATIVE MONOCYTE (SMH): 8.3 % (ref 4–15)
RELATIVE NEUTROPHIL (SMH): 65.6 % (ref 38–73)
SODIUM SERPL-SCNC: 138 MMOL/L (ref 136–145)
URATE SERPL-MCNC: 5.3 MG/DL (ref 3.4–7)
WBC # BLD AUTO: 6.39 K/UL (ref 3.9–12.7)
WBC #/AREA URNS AUTO: 1 /HPF

## 2025-06-20 PROCEDURE — 85025 COMPLETE CBC W/AUTO DIFF WBC: CPT

## 2025-06-20 PROCEDURE — 82040 ASSAY OF SERUM ALBUMIN: CPT

## 2025-06-20 PROCEDURE — 36415 COLL VENOUS BLD VENIPUNCTURE: CPT | Mod: PO

## 2025-06-20 PROCEDURE — 82570 ASSAY OF URINE CREATININE: CPT

## 2025-06-20 PROCEDURE — 84550 ASSAY OF BLOOD/URIC ACID: CPT

## 2025-06-20 PROCEDURE — 83735 ASSAY OF MAGNESIUM: CPT

## 2025-06-20 PROCEDURE — 81001 URINALYSIS AUTO W/SCOPE: CPT

## 2025-06-20 PROCEDURE — 84100 ASSAY OF PHOSPHORUS: CPT

## 2025-07-07 ENCOUNTER — OFFICE VISIT (OUTPATIENT)
Facility: CLINIC | Age: 75
End: 2025-07-07
Payer: MEDICARE

## 2025-07-07 ENCOUNTER — TELEPHONE (OUTPATIENT)
Facility: CLINIC | Age: 75
End: 2025-07-07

## 2025-07-07 VITALS
RESPIRATION RATE: 18 BRPM | HEART RATE: 64 BPM | BODY MASS INDEX: 35.75 KG/M2 | SYSTOLIC BLOOD PRESSURE: 154 MMHG | WEIGHT: 271 LBS | DIASTOLIC BLOOD PRESSURE: 74 MMHG | TEMPERATURE: 99 F

## 2025-07-07 DIAGNOSIS — D68.61 ANTICARDIOLIPIN SYNDROME: Primary | Chronic | ICD-10-CM

## 2025-07-07 DIAGNOSIS — Z86.711 HISTORY OF PULMONARY EMBOLUS (PE): ICD-10-CM

## 2025-07-07 DIAGNOSIS — D68.61 ANTICARDIOLIPIN SYNDROME: Primary | ICD-10-CM

## 2025-07-07 PROCEDURE — 1125F AMNT PAIN NOTED PAIN PRSNT: CPT | Mod: CPTII,S$GLB,, | Performed by: INTERNAL MEDICINE

## 2025-07-07 PROCEDURE — 4010F ACE/ARB THERAPY RXD/TAKEN: CPT | Mod: CPTII,S$GLB,, | Performed by: INTERNAL MEDICINE

## 2025-07-07 PROCEDURE — 3008F BODY MASS INDEX DOCD: CPT | Mod: CPTII,S$GLB,, | Performed by: INTERNAL MEDICINE

## 2025-07-07 PROCEDURE — 3288F FALL RISK ASSESSMENT DOCD: CPT | Mod: CPTII,S$GLB,, | Performed by: INTERNAL MEDICINE

## 2025-07-07 PROCEDURE — 99213 OFFICE O/P EST LOW 20 MIN: CPT | Mod: S$GLB,,, | Performed by: INTERNAL MEDICINE

## 2025-07-07 PROCEDURE — 3077F SYST BP >= 140 MM HG: CPT | Mod: CPTII,S$GLB,, | Performed by: INTERNAL MEDICINE

## 2025-07-07 PROCEDURE — 1159F MED LIST DOCD IN RCRD: CPT | Mod: CPTII,S$GLB,, | Performed by: INTERNAL MEDICINE

## 2025-07-07 PROCEDURE — 3078F DIAST BP <80 MM HG: CPT | Mod: CPTII,S$GLB,, | Performed by: INTERNAL MEDICINE

## 2025-07-07 PROCEDURE — G2211 COMPLEX E/M VISIT ADD ON: HCPCS | Mod: S$GLB,,, | Performed by: INTERNAL MEDICINE

## 2025-07-07 PROCEDURE — 99999 PR PBB SHADOW E&M-EST. PATIENT-LVL III: CPT | Mod: PBBFAC,,, | Performed by: INTERNAL MEDICINE

## 2025-07-07 PROCEDURE — 1101F PT FALLS ASSESS-DOCD LE1/YR: CPT | Mod: CPTII,S$GLB,, | Performed by: INTERNAL MEDICINE

## 2025-07-07 NOTE — PROGRESS NOTES
PROGRESS NOTE    Subjective:       Patient ID: Matt Rae is a 75 y.o. male.    Chief Complaint:  No chief complaint on file.  Hypercoag state,(ACL) prior PE.      History of Present Illness:   Matt Rae is a 75 y.o. male who presents with a history of hypercoag state and macrocytosis.      Mr. Rae is doing ok currently.      He was struggling with epistaxis which required ENT approach.  His cardiologist changed him to Eliquis due to this, thinking this may cause less bleeding.        Continues on Eliquis 5mg bid as of today, 7/7/2025. Changed from xarelto to Eliquis 4/1/2025 due to epistaxis issues.     Had shoulder surgery June 6, 2025    back surgery 5/3/2021 and required a second surgery as well.    Patient had 3 coronary stents placed Feb 2020.            Current Outpatient Medications:     allopurinoL (ZYLOPRIM) 300 MG tablet, , Disp: , Rfl:     apixaban (ELIQUIS) 5 mg Tab, Take 1 tablet (5 mg total) by mouth 2 (two) times daily., Disp: 180 tablet, Rfl: 3    aspirin (ECOTRIN) 81 MG EC tablet, Take 81 mg by mouth once daily., Disp: , Rfl:     b complex vitamins tablet, Take 1 tablet by mouth once daily., Disp: , Rfl:     captopriL (CAPOTEN) 50 MG tablet, Take 1 tablet (50 mg total) by mouth 2 (two) times daily., Disp: 180 tablet, Rfl: 3    carvediloL (COREG) 12.5 MG tablet, Take 1 tablet (12.5 mg total) by mouth 2 (two) times daily with meals., Disp: 180 tablet, Rfl: 3    cholecalciferol, vitamin D3, 125 mcg (5,000 unit) Tab, 5,000 Units once daily. , Disp: , Rfl:     levothyroxine (SYNTHROID) 75 MCG tablet, Take 75 mcg by mouth every morning., Disp: , Rfl:     magnesium oxide (MAG-OX) 400 mg (241.3 mg magnesium) tablet, Take 400 mg by mouth once daily. , Disp: , Rfl:     methocarbamoL (ROBAXIN) 500 MG Tab, Take 500 mg by mouth 3 (three) times daily as needed., Disp: , Rfl:     multivitamin capsule, Take 1 capsule by mouth once daily., Disp: ,  Rfl:     predniSONE (DELTASONE) 20 MG tablet, Take 20 mg by mouth daily as needed (Gout)., Disp: , Rfl:     simvastatin (ZOCOR) 10 MG tablet, TAKE 1 TABLET EVERY EVENING, Disp: 90 tablet, Rfl: 3    tadalafil (CIALIS) 20 MG Tab, Take 20 mg by mouth daily as needed. , Disp: , Rfl:     tramadol (ULTRAM) 50 mg tablet, Take 50 mg by mouth every 8 (eight) hours as needed. , Disp: , Rfl:         Objective:       Physical Examination:     BP (!) 154/74   Pulse 64   Temp 98.8 °F (37.1 °C)   Resp 18   Wt 122.9 kg (271 lb)   BMI 35.75 kg/m²     Physical Exam  Constitutional:       Appearance: Normal appearance.   HENT:      Head: Normocephalic and atraumatic.   Eyes:      General: No scleral icterus.     Conjunctiva/sclera: Conjunctivae normal.   Pulmonary:      Effort: Pulmonary effort is normal.   Abdominal:      General: Abdomen is flat.   Neurological:      General: No focal deficit present.      Mental Status: He is alert and oriented to person, place, and time.   Psychiatric:         Mood and Affect: Mood normal.         Behavior: Behavior normal.         Labs:   No results found for this or any previous visit (from the past 2 weeks).    CMP  Sodium   Date Value Ref Range Status   06/20/2025 138 136 - 145 mmol/L Final   03/07/2019 139 134 - 144 mmol/L      Potassium   Date Value Ref Range Status   06/20/2025 4.4 3.5 - 5.1 mmol/L Final     Chloride   Date Value Ref Range Status   06/20/2025 105 95 - 110 mmol/L Final   03/07/2019 106 98 - 110 mmol/L      CO2   Date Value Ref Range Status   06/20/2025 26 23 - 29 mmol/L Final     Glucose   Date Value Ref Range Status   06/20/2025 100 70 - 110 mg/dL Final   03/07/2019 125 (H) 70 - 99 mg/dL      BUN   Date Value Ref Range Status   06/20/2025 17 8 - 23 mg/dL Final     Creatinine   Date Value Ref Range Status   06/20/2025 0.7 0.5 - 1.4 mg/dL Final   03/07/2019 0.99 0.60 - 1.40 mg/dL    01/27/2013 1.0 0.5 - 1.4 mg/dL Final     Calcium   Date Value Ref Range Status  "  06/20/2025 9.2 8.7 - 10.5 mg/dL Final   01/27/2013 9.5 8.7 - 10.5 mg/dL Final     Protein Total   Date Value Ref Range Status   06/20/2025 6.9 6.0 - 8.4 gm/dL Final     Albumin   Date Value Ref Range Status   06/20/2025 3.6 3.5 - 5.2 g/dL Final   03/07/2019 3.6 3.1 - 4.7 g/dL      Bilirubin Total   Date Value Ref Range Status   06/20/2025 0.5 0.1 - 1.0 mg/dL Final     Comment:     For infants and newborns, interpretation of results should be based   on gestational age, weight and in agreement with clinical   observations.    Premature Infant recommended reference ranges:   0-24 hours:  <8.0 mg/dL   24-48 hours: <12.0 mg/dL   3-5 days:    <15.0 mg/dL   6-29 days:   <15.0 mg/dL     Alkaline Phosphatase   Date Value Ref Range Status   01/27/2013 63 55 - 135 U/L Final     ALP   Date Value Ref Range Status   06/20/2025 67 55 - 135 unit/L Final     AST ([place])   Date Value Ref Range Status   03/09/2016 29 17 - 59 U/L Final     AST   Date Value Ref Range Status   06/20/2025 15 10 - 40 unit/L Final     ALT   Date Value Ref Range Status   06/20/2025 12 10 - 44 unit/L Final     Anion Gap   Date Value Ref Range Status   06/20/2025 7 (L) 8 - 16 mmol/L Final   01/27/2013 13 5 - 15 meq/L Final     eGFR if    Date Value Ref Range Status   06/06/2022 92 > OR = 60 mL/min/1.73m2 Final     eGFR if non    Date Value Ref Range Status   06/06/2022 80 > OR = 60 mL/min/1.73m2 Final     No results found for: "CEA"  Lab Results   Component Value Date    PSA 1.0 01/26/2016    PSA 0.57 05/14/2010    PSA 0.8 06/20/2008           Assessment/Plan:   Anticardiolipin syndrome  Patient is doing well and is now on Eliquis which was started after he developed epistaxis on Xarelto.  He is doing ok with this and will continue to monitor with labs and follow up.      He did have his shoulder surgery done in June 2025 and doing well with this.  No clotting issues.     Will continue with yearly follow up.  "         Discussion:     Follow up in about 1 year (around 7/7/2026).      Electronically signed by Silvestre Albert

## 2025-07-07 NOTE — ASSESSMENT & PLAN NOTE
Patient is doing well and is now on Eliquis which was started after he developed epistaxis on Xarelto.  He is doing ok with this and will continue to monitor with labs and follow up.      He did have his shoulder surgery done in June 2025 and doing well with this.  No clotting issues.     Will continue with yearly follow up.

## (undated) DEVICE — CATHETER DIAGNOSTIC DXTERITY 6F PIGST

## (undated) DEVICE — VALVE BLEEDBACK CONTROL 1003330

## (undated) DEVICE — CATHETER DIAGNOSTIC DXTERITY 5FR JR4.0

## (undated) DEVICE — CATHETER EXPO MLTPK 6FR 100X110CM

## (undated) DEVICE — KIT INFLATION MERIT (IN8152, HP9200E)

## (undated) DEVICE — CATHETER GUIDE LAUNCHER EBU4 6X110

## (undated) DEVICE — SHEATH PINNACLE 6FRX10CM W/GUIDEWIRE

## (undated) DEVICE — GUIDEWIRE REG. ANGLED .035X260 LAUREATE

## (undated) DEVICE — CATHETER RADIAL TIG 4.0 OPTITORQUE 6X110

## (undated) DEVICE — SHEATH INTRODUCER SLENDER 6FX10CM

## (undated) DEVICE — HEMOSTAT VASC BAND REG 24CM

## (undated) DEVICE — GUIDEWIRE BMW 0.014/300